# Patient Record
Sex: MALE | Race: WHITE | Employment: FULL TIME | URBAN - METROPOLITAN AREA
[De-identification: names, ages, dates, MRNs, and addresses within clinical notes are randomized per-mention and may not be internally consistent; named-entity substitution may affect disease eponyms.]

---

## 2022-12-19 ENCOUNTER — HOSPITAL ENCOUNTER (OUTPATIENT)
Dept: PHYSICAL THERAPY | Age: 72
Setting detail: THERAPIES SERIES
Discharge: HOME | End: 2022-12-19
Payer: MEDICARE

## 2022-12-19 PROCEDURE — 97110 THERAPEUTIC EXERCISES: CPT

## 2022-12-19 PROCEDURE — 97161 PT EVAL LOW COMPLEX 20 MIN: CPT

## 2022-12-19 NOTE — PLAN OF CARE
[address]  Phone: [phone]   Fax:     [phone]                                                       Physical Therapy Certification    Dear Doreen Hill*,    We had the pleasure of evaluating the following patient for physical therapy services at [address] [place] [address]. A summary of our findings can be found in the initial assessment below. This includes our plan of care. If you have any questions or concerns regarding these findings, please do not hesitate to contact me at the office phone number checked above. Thank you for the referral.       Physician Signature:_______________________________Date:__________________  By signing above (or electronic signature), therapists plan is approved by physician      Patient: Gabi Trotter   : 1950   MRN: [id number]  Referring Physician: Doreen Hill*      Evaluation Date: 2022     Medical Diagnosis Information:  Diagnosis: M62.81 muscle weakness, generalized   Treatment Diagnosis: M62.81                                         Insurance information: PT Insurance Information: Medicare; [place]; ded met; [place] visits     Precautions/ Contra-indications: hx of stroke- affected L side; L side vision limited  Latex Allergy:  [x]NO      []YES  Preferred Language for Healthcare:   [x]English       []other:    C-SSRS Triggered by Intake questionnaire (Past 2 wk assessment ):   [x] No, Questionnaire did not trigger screening.   [] Yes, Patient intake triggered C-SSRS Screening      [] C-SSRS Screening completed  [] PCP notified via Epic     SUBJECTIVE: Patient stated complaint: Pt states back in July he fell down the stairs for the first time after he was unable to clear his R foot up the step and fell backwards.  States he did get MRI after fall and nothing substantial was found. 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 Pt states the last few months he feels his strength has gotten a little worse and notices he has to use his hands to push off or pull himself up into tractors/cars or needs assistance from his wife. Pt states he does not have any pain in the legs, just occasionally through the back but states it is just dependent on what he has been doing that day or how much he has worked. Pt states he does not recall any other falls in the last 6 months since initial fall down the steps. Denies changes in medication/lifestyle around time of fall.      Relevant Medical History:hx of stroke 13 years ago- affect Left side but reports full recovery; HTN  Functional Scale/Score: FOTO: 49; Risk adjusted: 47; discharge score: 58    Pain Scale: 0/10  Easing factors: rest  Provocative factors: fatigue, lifting/increased load     Type: []Constant   [x]Intermittent  []Radiating [x]Localized []other:     Numbness/Tingling: pt denies; reports diabetic neuropathy in bilateral feet, no major difference    Occupation/School: farmer    Living Status/Prior Level of Function: Independent with ADLs and IADLs; farm chores    OBJECTIVE:     ROM LEFT RIGHT   Lumbar flex FT to ankles; *seated   Lumbar ext neutral *seated   Lumbar rotation 50% 75%        HIP Flex 109 94   HIP Abd     HIP Ext     HIP IR 25 20   HIP ER 33 28   Knee ext 2 4   Knee Flex 132 130   Ankle PF     Ankle DF 5 7   Ankle In     Ankle Ev     Strength  LEFT RIGHT   HIP Flexors     HIP Abductors     HIP Ext     Hip ER     Knee EXT (quad) 40.7 48.8   Knee Flex (HS) 19.2 33.7   Ankle DF 25.1 28.8   Ankle PF     Ankle Inv 13.8 18.4   Ankle EV 13.7 21.6          Reflexes/Sensation:    [x]Dermatomes/Myotomes intact    [x]Reflexes equal and normal bilaterally- patellar 3+ baljeet; R achilles 2+, L achilles 1+; neg babinski/Lazo   []Other:    Joint mobility: L > R hip/knee   []Normal    [x]Hypo   []Hyper    Palpation: no TTP    Functional Mobility/Transfers: UE assist for push up/ecc control in/out of sitting positions    Posture: forward flexed; no obvious abnormalities    Bandages/Dressings/Incisions: n/a    Gait: (include devices/WB status) non-antalgic; baljeet Trendelenburg R > L    Orthopedic Special Tests:   N/a                         [x] Patient history, allergies, meds reviewed. Medical chart reviewed. See intake form. Review Of Systems (ROS):  [x]Performed Review of systems (Integumentary, CardioPulmonary, Neurological) by intake and observation. Intake form has been scanned into medical record. Patient has been instructed to contact their primary care physician regarding ROS issues if not already being addressed at this time.       Co-morbidities/Complexities (which will affect course of rehabilitation):   []None           Arthritic conditions   []Rheumatoid arthritis (M05.9)  [x]Osteoarthritis (M19.91)   Cardiovascular conditions   [x]Hypertension (I10)  []Hyperlipidemia (E78.5)  []Angina pectoris (I20)  []Atherosclerosis (I70)  [x]CVA Musculoskeletal conditions   []Disc pathology   []Congenital spine pathologies   []Prior surgical intervention  []Osteoporosis (M81.8)  []Osteopenia (M85.8)   Endocrine conditions   []Hypothyroid (E03.9)  []Hyperthyroid Gastrointestinal conditions   []Constipation (U88.46)   Metabolic conditions   []Morbid obesity (E66.01)  [x]Diabetes type 1(E10.65) or 2 (E11.65)   [x]Neuropathy (G60.9)     Pulmonary conditions   []Asthma (J45)  []Coughing   []COPD (J44.9)   Psychological Disorders  []Anxiety (F41.9)  []Depression (F32.9)   []Other:   []Other:          Barriers to/and or personal factors that will affect rehab potential:              []Age  []Sex    []Smoker              []Motivation/Lack of Motivation                        [x]Co-Morbidities              []Cognitive Function, education/learning barriers              []Environmental, home barriers              []profession/work barriers  []past PT/medical experience  []other:  Justification:     Falls Risk Assessment (30 days):   [] Falls Risk assessed and no intervention required. [x] Falls Risk assessed and Patient requires intervention due to being higher risk   TUG score (>12s at risk):     [] Falls education provided, including         ASSESSMENT: Pt presents for evaluation of bilateral lower extremity weakness that has progressively worsened over the last 6 months. Upon assessment, pt cleared for lumbar referral/radiculopathy presentations. Pt does have general LE weakness bilaterally, L > R, worsened by history of CVA and co-morbidities. Benefit from further functional balance assessment at future visit to address deficits and decrease falls risk. Pt had excellent tolerance to treatment today, only limited by fatigue and SOB. Pt to benefit from skilled PT to improve functional strength, endurance, balance, and decrease risk of falls and injury.    Functional Impairments:     []Noted lumbar/proximal hip/LE hypomobility   [x]Decreased LE functional ROM   [x]Decreased core/proximal hip strength and neuromuscular control   [x]Decreased LE functional strength   [x]Reduced balance/proprioceptive control   []other:      Functional Activity Limitations (from functional questionnaire and intake)   []Reduced ability to tolerate prolonged functional positions   [x]Reduced ability or difficulty with changes of positions or transfers between positions   [x]Reduced ability to maintain good posture and demonstrate good body mechanics with sitting, bending, and lifting   []Reduced ability to sleep   [] Reduced ability or tolerance with driving and/or computer work   [x]Reduced ability to perform lifting, carrying tasks   [x]Reduced ability to squat   [x]Reduced ability to forward bend   [x]Reduced ability to ambulate prolonged functional periods/distances/surfaces   [x]Reduced ability to ascend/descend stairs   [x]Reduced ability to run, hop or jump   []other:     Participation Restrictions   []Reduced participation in self care activities   [x]Reduced participation in home management activities   [x]Reduced participation in work activities   []Reduced participation in social activities. []Reduced participation in sport activities. Classification :    []Signs/symptoms consistent with post-surgical status including decreased ROM, strength and function.    []Signs/symptoms consistent with joint sprain/strain   []Signs/symptoms consistent with patella-femoral syndrome   []Signs/symptoms consistent with knee OA/hip OA   []Signs/symptoms consistent with internal derangement of knee/Hip   [x]Signs/symptoms consistent with functional hip weakness/NMR control      []Signs/symptoms consistent with tendinitis/tendinosis    []signs/symptoms consistent with pathology which may benefit from Dry needling      []other:      Prognosis/Rehab Potential:      []Excellent   [x]Good    []Fair   []Poor    Tolerance of evaluation/treatment:    []Excellent   [x]Good    []Fair   []Poor    Physical Therapy Evaluation Complexity Justification  [x] A history of present problem with:  [] no personal factors and/or comorbidities that impact the plan of care;  []1-2 personal factors and/or comorbidities that impact the plan of care  [x]3 personal factors and/or comorbidities that impact the plan of care  [x] An examination of body systems using standardized tests and measures addressing any of the following: body structures and functions (impairments), activity limitations, and/or participation restrictions;:  [x] a total of 1-2 or more elements   [] a total of 3 or more elements   [] a total of 4 or more elements   [x] A clinical presentation with:  [x] stable and/or uncomplicated characteristics   [] evolving clinical presentation with changing characteristics  [] unstable and unpredictable characteristics;   [x] Clinical decision making of [x] low, [] moderate, [] high complexity using standardized patient assessment instrument and/or measurable assessment of functional outcome. [x] EVAL (LOW) 14612 (typically 20 minutes face-to-face)  [] EVAL (MOD) 20740 (typically 30 minutes face-to-face)  [] EVAL (HIGH) 47392 (typically 45 minutes face-to-face)  [] RE-EVAL     PLAN:  Frequency/Duration:  2 days per week for 8 Weeks:  Interventions:  [x]  Therapeutic exercise including: strength training, ROM, for Lower extremity and core   [x]  NMR activation and proprioception for LE, Glutes and Core   [x]  Manual therapy as indicated for LE, Hip and spine to include: Dry Needling/IASTM, STM, PROM, Gr I-IV mobilizations, manipulation. [x] Modalities as needed that may include: thermal agents, E-stim, Biofeedback, US, iontophoresis as indicated  [x] Patient education on joint protection, postural re-education, activity modification, progression of HEP. HEP instruction: DMHRPL    GOALS:  Patient stated goal: \"be able to get in and out of the tractor\"  [] Progressing: [] Met: [x] Not Met: [] Adjusted    Therapist goals for Patient:   Short Term Goals: To be achieved in: 2 weeks  1. Independent in HEP and progression per patient tolerance, in order to prevent re-injury. [] Progressing: [] Met: [x] Not Met: [] Adjusted  2. Patient will have a decrease in pain to facilitate improvement in movement, function, and ADLs as indicated by Functional Deficits. [] Progressing: [] Met: [x] Not Met: [] Adjusted    Long Term Goals: To be achieved in: 8 weeks  1. Disability index score of 10% or less for the LEFS to assist with reaching prior level of function. [] Progressing: [] Met: [x] Not Met: [] Adjusted  2. Patient will demonstrate increased AROM to full LE baljeet to allow for proper joint functioning as indicated by patients Functional Deficits.    [] Progressing: [] Met: [x] Not Met: [] Adjusted  3. Patient will demonstrate an increase in Strength to good proximal hip strength and control, within 5lb HHD in LE to allow for proper functional mobility as indicated by patients Functional Deficits. [] Progressing: [] Met: [x] Not Met: [] Adjusted  4. Patient will return to daily functional activities without increased symptoms or restriction. [] Progressing: [] Met: [x] Not Met: [] Adjusted  5. Patient will be able to stand from a standard height chair without increased symptoms or restrictions. [] Progressing: [] Met: [x] Not Met: [] Adjusted  5. BY DISCHARGE: Pt will be able to get in and out of the tractor at the end of the day without assistance or restrictions.   [] Progressing: [] Met: [x] Not Met: [] Adjusted     Electronically signed by:  Adam Lucia PT

## 2022-12-19 NOTE — FLOWSHEET NOTE
[facility]  [address] [facility]  Phone: [phone] Fax: [phone]    Physical Therapy Treatment Note/ Progress Report:       Date:  2022    Patient Name:  Callie Morris    :  1950  MRN: [id number]  Restrictions/Precautions:    Medical/Treatment Diagnosis Information:  Diagnosis: M62.81 muscle weakness, generalized  Treatment Diagnosis: U61.49  Insurance/Certification information:  PT Insurance Information: Medicare; [place]; ded met; [place] visits  Physician Information:  [facility] signed (Y/N):     Date of Patient follow up with Physician:      Progress Report: [x]  Yes  []  No     Date Range for reporting period:  Beginnin2022  Ending:     Progress report due (10 Rx/or 30 days whichever is less): 803     Recertification due (POC duration/ or 90 days whichever is less): 2023     Visit # Insurance Allowable Auth Needed   1 [place] []Yes    [x]No     Latex Allergy:  [x]NO      []YES  Preferred Language for Healthcare:   [x]English       []other:    Functional Scale: FOTO: 49; Risk adjusted: 47; discharge score: 58   Date assessed:2022    Pain level:  0-2/10     SUBJECTIVE:  See eval    OBJECTIVE: See eval  Observation:   Test measurements:      RESTRICTIONS/PRECAUTIONS: hx of stroke- affected L side; L side vision limited; increased falls risk    Exercises/Interventions:     Therapeutic Ex (42550)   Min: 20 Sets/sec Reps Notes/CUES   Retro Stepper/BIKE      Alter G      Bridge 2 10    Sportco      SLR 2 15 baljeet   Supine hip abd iso 1 15 Black AK   Clam ABD      Hip Ext Vena Raspberry      BOSU fwd/side lunge      BOSU squat      Leg Press Iso/Con/Ecc 0-      Cybex HS curl      TKE      Glute side walks      RDL      Slide Lunge      Slide HS eccentrics      Step ups/ecc step down      Swissball wall rolls- in SLS- hip drive      Quad hip ext/wall-ball rolls Manual Intervention (06023)  Min:      Knee mobs/PROM      Tib/Fem Mobs      Patella Mobs      Ankle mobs                  NMR re-education (34723)  Min:   CUES NEEDED   Guyanese/Biofeedback 10/10      BFR      G. Med activation      Hip Ext full ROM/ G. Activation      Bosu Bal and Prop- G Med      Single leg stance/Balance/Prop      Bosu Retro G. Med act      Prone Hip froggers- sliders/elevated            Therapeutic Activity (02509)  Min:      Ladders      Plyos      Dynamic Balance                            Therapeutic Exercise and NMR EXR  [x] (74977) Provided verbal/tactile cueing for activities related to strengthening, flexibility, endurance, ROM for improvements in LE, proximal hip, and core control with self care, mobility, lifting, ambulation. [x] (13557) Provided verbal/tactile cueing for activities related to improving balance, coordination, kinesthetic sense, posture, motor skill, proprioception  to assist with LE, proximal hip, and core control in self care, mobility, lifting, ambulation and eccentric single leg control.      NMR and Therapeutic Activities:    [x] (72707 or 91324) Provided verbal/tactile cueing for activities related to improving balance, coordination, kinesthetic sense, posture, motor skill, proprioception and motor activation to allow for proper function of core, proximal hip and LE with self care and ADLs and functional mobility.   [] (34547) Gait Re-education- Provided training and instruction to the patient for proper LE, core and proximal hip recruitment and positioning and eccentric body weight control with ambulation re-education including up and down stairs     Home Exercise Program:    [x] (70410) Reviewed/Progressed HEP activities related to strengthening, flexibility, endurance, ROM of core, proximal hip and LE for functional self-care, mobility, lifting and ambulation/stair navigation   [] (19136)Reviewed/Progressed HEP activities related to improving balance, coordination, kinesthetic sense, posture, motor skill, proprioception of core, proximal hip and LE for self care, mobility, lifting, and ambulation/stair navigation      Manual Treatments:  PROM / STM / Oscillations-Mobs:  G-I, II, III, IV (PA's, Inf., Post.)  [x] (91556) Provided manual therapy to mobilize LE, proximal hip and/or LS spine soft tissue/joints for the purpose of modulating pain, promoting relaxation,  increasing ROM, reducing/eliminating soft tissue swelling/inflammation/restriction, improving soft tissue extensibility and allowing for proper ROM for normal function with self care, mobility, lifting and ambulation. Modalities:     [] GAME READY (VASO)- for significant edema, swelling, pain control. Charges:  Timed Code Treatment Minutes: 25   Total Treatment Minutes: 50      [x] EVAL (LOW) 83863 (typically 20 minutes face-to-face)  [] EVAL (MOD) 35050 (typically 30 minutes face-to-face)  [] EVAL (HIGH) 73968 (typically 45 minutes face-to-face)  [] RE-EVAL     [x] ZV(96315) x 1    [] DRY NEEDLE 1 OR 2 MUSCLES  [] NMR (47922) x     [] DRY NEEDLE 3+ MUSCLES  [] Manual (54170) x       [] TA (42304) x     [] Mech Traction (50088)  [] ES(attended) (35148)     [] ES (un) (35889):   [] VASO (38347)  [] Other:      GOALS  Patient stated goal: \"be able to get in and out of the tractor\"  [] Progressing: [] Met: [x] Not Met: [] Adjusted    Therapist goals for Patient:   Short Term Goals: To be achieved in: 2 weeks  1. Independent in HEP and progression per patient tolerance, in order to prevent re-injury. [] Progressing: [] Met: [x] Not Met: [] Adjusted  2. Patient will have a decrease in pain to facilitate improvement in movement, function, and ADLs as indicated by Functional Deficits. [] Progressing: [] Met: [x] Not Met: [] Adjusted    Long Term Goals: To be achieved in: 8 weeks  1. Disability index score of 10% or less for the LEFS to assist with reaching prior level of function.    [] Progressing: [] Met: [x] Not Met: [] Adjusted  2. Patient will demonstrate increased AROM to full LE baljeet to allow for proper joint functioning as indicated by patients Functional Deficits. [] Progressing: [] Met: [x] Not Met: [] Adjusted  3. Patient will demonstrate an increase in Strength to good proximal hip strength and control, within 5lb HHD in LE to allow for proper functional mobility as indicated by patients Functional Deficits. [] Progressing: [] Met: [x] Not Met: [] Adjusted  4. Patient will return to daily functional activities without increased symptoms or restriction. [] Progressing: [] Met: [x] Not Met: [] Adjusted  5. Patient will be able to stand from a standard height chair without increased symptoms or restrictions. [] Progressing: [] Met: [x] Not Met: [] Adjusted  5. BY DISCHARGE: Pt will be able to get in and out of the tractor at the end of the day without assistance or restrictions. [] Progressing: [] Met: [x] Not Met: [] Adjusted     ASSESSMENT:  See eval          Treatment/Activity Tolerance:  [x] Patient tolerated treatment well [] Patient limited by fatique  [] Patient limited by pain  [] Patient limited by other medical complications  [] Other:     Overall Progression Towards Functional goals/ Treatment Progress Update:  [] Patient is progressing as expected towards functional goals listed. [] Progression is slowed due to complexities/Impairments listed. [] Progression has been slowed due to co-morbidities.   [x] Plan just implemented, too soon to assess goals progression <30days   [] Goals require adjustment due to lack of progress  [] Patient is not progressing as expected and requires additional follow up with physician  [] Other    Prognosis for POC: [x] Good [] Fair  [] Poor    Patient requires continued skilled intervention: [x] Yes  [] No        PLAN: See eval  [] Continue per plan of care [] Alter current plan (see comments)  [x] Plan of care initiated [] Hold pending MD visit [] Discharge    Electronically signed by: Dedrick Thakkar PT    Note: If patient does not return for scheduled/recommended follow up visits, this note will serve as a discharge from care along with the most recent update on progress.

## 2022-12-22 ENCOUNTER — HOSPITAL ENCOUNTER (OUTPATIENT)
Dept: PHYSICAL THERAPY | Age: 72
Setting detail: THERAPIES SERIES
Discharge: HOME | End: 2022-12-22
Payer: MEDICARE

## 2022-12-22 PROCEDURE — 97110 THERAPEUTIC EXERCISES: CPT

## 2022-12-22 PROCEDURE — 97530 THERAPEUTIC ACTIVITIES: CPT

## 2022-12-22 NOTE — FLOWSHEET NOTE
[place] [address] [facility], [address]  Phone: [phone] Fax: [phone]    Physical Therapy Treatment Note/ Progress Report:       Date:  2022    Patient Name:  Joshua Andersen    :  1950  MRN: [id number]  Restrictions/Precautions:    Medical/Treatment Diagnosis Information:  Diagnosis: M62.81 muscle weakness, generalized  Treatment Diagnosis: R54.88  Insurance/Certification information:  PT Insurance Information: Medicare; [place]; ded met; [place] visits  Physician Information:  Tai [facility] signed (Y/N):     Date of Patient follow up with Physician:      Progress Report: []  Yes  [x]  No     Date Range for reporting period:  Beginnin2022  Ending:     Progress report due (10 Rx/or 30 days whichever is less): 3630     Recertification due (POC duration/ or 90 days whichever is less): 2023     Visit # Insurance Allowable Auth Needed   2 [place] []Yes    [x]No     Latex Allergy:  [x]NO      []YES  Preferred Language for Healthcare:   [x]English       []other:    Functional Scale: FOTO: 49; Risk adjusted: 47; discharge score: 58   Date assessed:2022    Pain level:  0-2/10     SUBJECTIVE:  States he has had no issues w/ HEP exercises, is safe to do them when wife is home in case he needs help.      OBJECTIVE:   Observation:   Test measurements:      RESTRICTIONS/PRECAUTIONS: hx of stroke- affected L side; L side vision limited; increased falls risk    Exercises/Interventions:     Therapeutic Ex (53195)   Min: 25 Sets/sec Reps Notes/CUES   Retro Stepper/BIKE 5'  Resistance 5 - increase as able   Alter G      Bridge   HEP   Sportcord March      SLR   HEP   Supine hip abd iso   HEP; Black AK   Clam ABD      Hip Ext Marcina Ny      BOSU fwd/side lunge      BOSU squat      Leg Press DL 3 10 90#   Cybex HS curl      TKE      Glute side walks 2 20 Green AK   RDL      Slide Lunge      Slide HS eccentrics      Step ups/ecc step down      Swissball wall rolls- in SLS- hip drive      Quad hip ext/wall-ball rolls                  Manual Intervention (20565)  Min:      Knee mobs/PROM      Tib/Fem Mobs      Patella Mobs      Ankle mobs                  NMR re-education (68530)  Min: 5   CUES NEEDED   Rhomberg balance - airex 20'' 3 Eyes open at half wall; mod difficulty   BFR      G. Med activation      Hip Ext full ROM/ G. Activation      Bosu Bal and Prop- G Med      Single leg stance/Balance/Prop      Bosu Retro G. Med act      Prone Hip froggers- sliders/elevated            Therapeutic Activity (06212)  Min: 15      Ladders      Plyos      Dynamic Balance      Sit to stand 2 10 High low table- approx 50 deg knee flexion   Step up 2 5 Clemente; half wall             Therapeutic Exercise and NMR EXR  [x] (06733) Provided verbal/tactile cueing for activities related to strengthening, flexibility, endurance, ROM for improvements in LE, proximal hip, and core control with self care, mobility, lifting, ambulation. [x] (84718) Provided verbal/tactile cueing for activities related to improving balance, coordination, kinesthetic sense, posture, motor skill, proprioception  to assist with LE, proximal hip, and core control in self care, mobility, lifting, ambulation and eccentric single leg control.      NMR and Therapeutic Activities:    [x] (72686 or 70261) Provided verbal/tactile cueing for activities related to improving balance, coordination, kinesthetic sense, posture, motor skill, proprioception and motor activation to allow for proper function of core, proximal hip and LE with self care and ADLs and functional mobility.   [] (35603) Gait Re-education- Provided training and instruction to the patient for proper LE, core and proximal hip recruitment and positioning and eccentric body weight control with ambulation re-education including up and down stairs     Home Exercise Program:    [x] (07657) Reviewed/Progressed HEP activities related to strengthening, flexibility, endurance, ROM of core, proximal hip and LE for functional self-care, mobility, lifting and ambulation/stair navigation   [] (55518)Reviewed/Progressed HEP activities related to improving balance, coordination, kinesthetic sense, posture, motor skill, proprioception of core, proximal hip and LE for self care, mobility, lifting, and ambulation/stair navigation      Manual Treatments:  PROM / STM / Oscillations-Mobs:  G-I, II, III, IV (PA's, Inf., Post.)  [x] (45953) Provided manual therapy to mobilize LE, proximal hip and/or LS spine soft tissue/joints for the purpose of modulating pain, promoting relaxation,  increasing ROM, reducing/eliminating soft tissue swelling/inflammation/restriction, improving soft tissue extensibility and allowing for proper ROM for normal function with self care, mobility, lifting and ambulation. Modalities:     [] GAME READY (VASO)- for significant edema, swelling, pain control. Charges:  Timed Code Treatment Minutes: 45   Total Treatment Minutes: 45      [] EVAL (LOW) 30563 (typically 20 minutes face-to-face)  [] EVAL (MOD) 83121 (typically 30 minutes face-to-face)  [] EVAL (HIGH) 46137 (typically 45 minutes face-to-face)  [] RE-EVAL     [x] RT(99457) x 2    [] DRY NEEDLE 1 OR 2 MUSCLES  [] NMR (81255) x     [] DRY NEEDLE 3+ MUSCLES  [] Manual (57533) x       [x] TA (56700) x  1   [] Mech Traction (32883)  [] ES(attended) (93269)     [] ES (un) (87105):   [] VASO (58550)  [] Other:      GOALS  Patient stated goal: \"be able to get in and out of the tractor\"  [] Progressing: [] Met: [x] Not Met: [] Adjusted    Therapist goals for Patient:   Short Term Goals: To be achieved in: 2 weeks  1. Independent in HEP and progression per patient tolerance, in order to prevent re-injury. [] Progressing: [] Met: [x] Not Met: [] Adjusted  2.  Patient will have a decrease in pain to facilitate improvement in movement, function, and ADLs as indicated by Functional Deficits. [] Progressing: [] Met: [x] Not Met: [] Adjusted    Long Term Goals: To be achieved in: 8 weeks  1. Disability index score of 10% or less for the LEFS to assist with reaching prior level of function. [] Progressing: [] Met: [x] Not Met: [] Adjusted  2. Patient will demonstrate increased AROM to full LE baljeet to allow for proper joint functioning as indicated by patients Functional Deficits. [] Progressing: [] Met: [x] Not Met: [] Adjusted  3. Patient will demonstrate an increase in Strength to good proximal hip strength and control, within 5lb HHD in LE to allow for proper functional mobility as indicated by patients Functional Deficits. [] Progressing: [] Met: [x] Not Met: [] Adjusted  4. Patient will return to daily functional activities without increased symptoms or restriction. [] Progressing: [] Met: [x] Not Met: [] Adjusted  5. Patient will be able to stand from a standard height chair without increased symptoms or restrictions. [] Progressing: [] Met: [x] Not Met: [] Adjusted  5. BY DISCHARGE: Pt will be able to get in and out of the tractor at the end of the day without assistance or restrictions. [] Progressing: [] Met: [x] Not Met: [] Adjusted     ASSESSMENT: Excellent tolerance to treatment today, focusing on closed chain functional loading and balance. Pt does have difficult time w/ anterior weight shift 2/2 neuropathy. Requires cues for eccentric control and preventing hyperextension through knees. Has slight increase in difficulty w/ LLE single leg loading 2/2 history of stroke. Increased time for task completion and rest breaks. Continue to progress functional strength and balance to improve independence and decrease falls risk.           Treatment/Activity Tolerance:  [x] Patient tolerated treatment well [] Patient limited by fatique  [] Patient limited by pain  [] Patient limited by other medical complications  [] Other:     Overall Progression Towards Functional goals/ Treatment Progress Update:  [] Patient is progressing as expected towards functional goals listed. [] Progression is slowed due to complexities/Impairments listed. [] Progression has been slowed due to co-morbidities. [x] Plan just implemented, too soon to assess goals progression <30days   [] Goals require adjustment due to lack of progress  [] Patient is not progressing as expected and requires additional follow up with physician  [] Other    Prognosis for POC: [x] Good [] Fair  [] Poor    Patient requires continued skilled intervention: [x] Yes  [] No        PLAN: 2x/week x8 weeks  [] Continue per plan of care [] Alter current plan (see comments)  [x] Plan of care initiated [] Hold pending MD visit [] Discharge    Electronically signed by: Handy Joseph PT    Note: If patient does not return for scheduled/recommended follow up visits, this note will serve as a discharge from care along with the most recent update on progress.

## 2022-12-27 ENCOUNTER — HOSPITAL ENCOUNTER (OUTPATIENT)
Dept: PHYSICAL THERAPY | Age: 72
Setting detail: THERAPIES SERIES
Discharge: HOME | End: 2022-12-27
Payer: MEDICARE

## 2022-12-27 PROCEDURE — 97530 THERAPEUTIC ACTIVITIES: CPT

## 2022-12-27 PROCEDURE — 97110 THERAPEUTIC EXERCISES: CPT

## 2022-12-27 NOTE — FLOWSHEET NOTE
[facility] [address] [facility]  Phone: [phone] Fax: [phone]    Physical Therapy Treatment Note/ Progress Report:       Date:  2022    Patient Name:  Lang Joseph    :  1950  MRN: [id number]  Restrictions/Precautions:    Medical/Treatment Diagnosis Information:  Diagnosis: M62.81 muscle weakness, generalized  Treatment Diagnosis: R29.68  Insurance/Certification information:  PT Insurance Information: Medicare; [place]; ded met; [place] visits  Physician Information:  Tai [facility] signed (Y/N):     Date of Patient follow up with Physician:      Progress Report: []  Yes  [x]  No     Date Range for reporting period:  Beginnin2022  Ending:     Progress report due (10 Rx/or 30 days whichever is less):      Recertification due (POC duration/ or 90 days whichever is less): 2023     Visit # Insurance Allowable Auth Needed   3 [place] []Yes    [x]No     Latex Allergy:  [x]NO      []YES  Preferred Language for Healthcare:   [x]English       []other:    Functional Scale: FOTO: 49; Risk adjusted: 47; discharge score: 58   Date assessed:2022    Pain level:  0-2/10     SUBJECTIVE:  States he is doing well with home exercises. He had a couple of days where he did not get on the floor to do HEP since his wife was not home to help in case he could not get back up by himself. No complaints of pain today. Pt reports that he has a lot of difficulty lifting the L foot to put it up as high as he would need to get a foothold on his tractor step.      OBJECTIVE:   Observation:   Test measurements:      RESTRICTIONS/PRECAUTIONS: hx of stroke- affected L side; L side vision limited; increased falls risk    Exercises/Interventions:     Therapeutic Ex (92684)   Min: 30 Sets/sec Reps Notes/CUES   Retro Stepper/BIKE 5'  Resistance 5 - increase as able   Alter G      Bridge   HEP   LAQ in and ADLs and functional mobility.   [] (34924) Gait Re-education- Provided training and instruction to the patient for proper LE, core and proximal hip recruitment and positioning and eccentric body weight control with ambulation re-education including up and down stairs     Home Exercise Program:    [x] (71285) Reviewed/Progressed HEP activities related to strengthening, flexibility, endurance, ROM of core, proximal hip and LE for functional self-care, mobility, lifting and ambulation/stair navigation   [] (69853)Reviewed/Progressed HEP activities related to improving balance, coordination, kinesthetic sense, posture, motor skill, proprioception of core, proximal hip and LE for self care, mobility, lifting, and ambulation/stair navigation      Manual Treatments:  PROM / STM / Oscillations-Mobs:  G-I, II, III, IV (PA's, Inf., Post.)  [x] (40547) Provided manual therapy to mobilize LE, proximal hip and/or LS spine soft tissue/joints for the purpose of modulating pain, promoting relaxation,  increasing ROM, reducing/eliminating soft tissue swelling/inflammation/restriction, improving soft tissue extensibility and allowing for proper ROM for normal function with self care, mobility, lifting and ambulation. Modalities:     [] GAME READY (VASO)- for significant edema, swelling, pain control.      Charges:  Timed Code Treatment Minutes: 40   Total Treatment Minutes: 45      [] EVAL (LOW) 22389 (typically 20 minutes face-to-face)  [] EVAL (MOD) 78397 (typically 30 minutes face-to-face)  [] EVAL (HIGH) 99336 (typically 45 minutes face-to-face)  [] RE-EVAL     [x] NT(75255) x 2    [] DRY NEEDLE 1 OR 2 MUSCLES  [] NMR (07678) x     [] DRY NEEDLE 3+ MUSCLES  [] Manual (89952) x       [x] TA (13316) x  1   [] Mech Traction (84305)  [] ES(attended) (37514)     [] ES (un) (04246):   [] VASO (35418)  [] Other:      GOALS  Patient stated goal: \"be able to get in and out of the tractor\"  [] Progressing: [] Met: [x] Not Met: [] Continue to progress functional strength and balance to improve independence and decrease falls risk. Treatment/Activity Tolerance:  [x] Patient tolerated treatment well [] Patient limited by fatique  [] Patient limited by pain  [] Patient limited by other medical complications  [] Other:     Overall Progression Towards Functional goals/ Treatment Progress Update:  [] Patient is progressing as expected towards functional goals listed. [] Progression is slowed due to complexities/Impairments listed. [] Progression has been slowed due to co-morbidities. [x] Plan just implemented, too soon to assess goals progression <30days   [] Goals require adjustment due to lack of progress  [] Patient is not progressing as expected and requires additional follow up with physician  [] Other    Prognosis for POC: [x] Good [] Fair  [] Poor    Patient requires continued skilled intervention: [x] Yes  [] No        PLAN: 2x/week x8 weeks  [x] Continue per plan of care [] Alter current plan (see comments)  [] Plan of care initiated [] Hold pending MD visit [] Discharge    Electronically signed by: Levy Quintanilla PTA    Note: If patient does not return for scheduled/recommended follow up visits, this note will serve as a discharge from care along with the most recent update on progress.

## 2022-12-29 ENCOUNTER — HOSPITAL ENCOUNTER (OUTPATIENT)
Dept: PHYSICAL THERAPY | Age: 72
Setting detail: THERAPIES SERIES
Discharge: HOME | End: 2022-12-29
Payer: MEDICARE

## 2022-12-29 PROCEDURE — 97530 THERAPEUTIC ACTIVITIES: CPT

## 2022-12-29 PROCEDURE — 97110 THERAPEUTIC EXERCISES: CPT

## 2022-12-29 NOTE — FLOWSHEET NOTE
[place] [address] [facility]  Phone: [phone] Fax: [phone]    Physical Therapy Treatment Note/ Progress Report:       Date:  2022    Patient Name:  Akash Menjivar    :  1950  MRN: [id number]  Restrictions/Precautions:    Medical/Treatment Diagnosis Information:  Diagnosis: M62.81 muscle weakness, generalized  Treatment Diagnosis: J62.40  Insurance/Certification information:  PT Insurance Information: Medicare; [place]; ded met; [place] visits  Physician Information:  Tai [facility] signed (Y/N):     Date of Patient follow up with Physician:      Progress Report: []  Yes  [x]  No     Date Range for reporting period:  Beginnin2022  Ending:     Progress report due (10 Rx/or 30 days whichever is less):      Recertification due (POC duration/ or 90 days whichever is less): 2023     Visit # Insurance Allowable Auth Needed   4 [place] []Yes    [x]No     Latex Allergy:  [x]NO      []YES  Preferred Language for Healthcare:   [x]English       []other:    Functional Scale: FOTO: 49; Risk adjusted: 47; discharge score: 58   Date assessed:2022    Pain level:  0-2/10     SUBJECTIVE:  Pt states that he is doing ok; has to take breaks at times as he gets short of breath. Working on exercises at home without much issue.      OBJECTIVE:   Observation:   Test measurements:      RESTRICTIONS/PRECAUTIONS: hx of stroke- affected L side; L side vision limited; increased falls risk    Exercises/Interventions:     Therapeutic Ex (50198)   Min: 30 Sets/sec Reps Notes/CUES   Retro Stepper/BIKE 5'  Resistance 5 - increase as able   Alter G      Bridge   HEP   LAQ in chair 2 10 3lb   Hip rotations/abductions in chair 2 10 3lb   SLR   HEP   Supine hip abd iso   HEP; Black AK   Standing hip abd 3lb    Hip Ext /table    Standing SC rows 2 10 Staggered stance, 2 yellow SC's         Leg Press DL 90#   Cybex HS curl      SC fwd/bwd walks 4 10 SBA, 1 yellow SC around waist   Glute side walks 4 10 Green AK at wall   RDL      Slide Lunge      Slide HS eccentrics      Step ups/ecc step down      Swissball wall rolls- in SLS- hip drive      Quad hip ext/wall-ball rolls                  Manual Intervention (93924)  Min:      Knee mobs/PROM      Tib/Fem Mobs      Patella Mobs      Ankle mobs                  NMR re-education (30306)  Min: 0   CUES NEEDED   Rhomberg balance - airex Eyes open at half wall; mod difficulty   BFR      G. Med activation      Hip Ext full ROM/ G. Activation      Bosu Bal and Prop- G Med      Single leg stance/Balance/Prop      Bosu Retro G. Med act      Prone Hip froggers- sliders/elevated            Therapeutic Activity (14836)  Min: 12      Ladders      Plyos      Standing marches  2 10 3lb on ankles   Sit to stand 2 10 Chair+2 Airex- approx 50 deg knee flexion   Step up Clemente; half wall, 4in box             Therapeutic Exercise and NMR EXR  [x] (75474) Provided verbal/tactile cueing for activities related to strengthening, flexibility, endurance, ROM for improvements in LE, proximal hip, and core control with self care, mobility, lifting, ambulation. [x] (67944) Provided verbal/tactile cueing for activities related to improving balance, coordination, kinesthetic sense, posture, motor skill, proprioception  to assist with LE, proximal hip, and core control in self care, mobility, lifting, ambulation and eccentric single leg control.      NMR and Therapeutic Activities:    [x] (89356 or 68312) Provided verbal/tactile cueing for activities related to improving balance, coordination, kinesthetic sense, posture, motor skill, proprioception and motor activation to allow for proper function of core, proximal hip and LE with self care and ADLs and functional mobility.   [] (68242) Gait Re-education- Provided training and instruction to the patient for proper LE, core and proximal hip recruitment and positioning and eccentric body weight control with ambulation re-education including up and down stairs     Home Exercise Program:    [x] (84201) Reviewed/Progressed HEP activities related to strengthening, flexibility, endurance, ROM of core, proximal hip and LE for functional self-care, mobility, lifting and ambulation/stair navigation   [] (06536)Reviewed/Progressed HEP activities related to improving balance, coordination, kinesthetic sense, posture, motor skill, proprioception of core, proximal hip and LE for self care, mobility, lifting, and ambulation/stair navigation      Manual Treatments:  PROM / STM / Oscillations-Mobs:  G-I, II, III, IV (PA's, Inf., Post.)  [] (60457) Provided manual therapy to mobilize LE, proximal hip and/or LS spine soft tissue/joints for the purpose of modulating pain, promoting relaxation,  increasing ROM, reducing/eliminating soft tissue swelling/inflammation/restriction, improving soft tissue extensibility and allowing for proper ROM for normal function with self care, mobility, lifting and ambulation. Modalities:     [] GAME READY (VASO)- for significant edema, swelling, pain control. Charges:  Timed Code Treatment Minutes: 42   Total Treatment Minutes: 45      [] EVAL (LOW) 16652 (typically 20 minutes face-to-face)  [] EVAL (MOD) 35277 (typically 30 minutes face-to-face)  [] EVAL (HIGH) 68415 (typically 45 minutes face-to-face)  [] RE-EVAL     [x] ZW(40962) x 2    [] DRY NEEDLE 1 OR 2 MUSCLES  [] NMR (54556) x     [] DRY NEEDLE 3+ MUSCLES  [] Manual (91318) x       [x] TA (87044) x  1   [] Mech Traction (24236)  [] ES(attended) (44502)     [] ES (un) (61811):   [] VASO (26683)  [] Other:      GOALS  Patient stated goal: \"be able to get in and out of the tractor\"  [] Progressing: [] Met: [x] Not Met: [] Adjusted    Therapist goals for Patient:   Short Term Goals: To be achieved in: 2 weeks  1.  Independent in HEP and progression per patient tolerance, in order to prevent re-injury. [] Progressing: [] Met: [x] Not Met: [] Adjusted  2. Patient will have a decrease in pain to facilitate improvement in movement, function, and ADLs as indicated by Functional Deficits. [] Progressing: [] Met: [x] Not Met: [] Adjusted    Long Term Goals: To be achieved in: 8 weeks  1. Disability index score of 10% or less for the LEFS to assist with reaching prior level of function. [] Progressing: [] Met: [x] Not Met: [] Adjusted  2. Patient will demonstrate increased AROM to full LE baljeet to allow for proper joint functioning as indicated by patients Functional Deficits. [] Progressing: [] Met: [x] Not Met: [] Adjusted  3. Patient will demonstrate an increase in Strength to good proximal hip strength and control, within 5lb HHD in LE to allow for proper functional mobility as indicated by patients Functional Deficits. [] Progressing: [] Met: [x] Not Met: [] Adjusted  4. Patient will return to daily functional activities without increased symptoms or restriction. [] Progressing: [] Met: [x] Not Met: [] Adjusted  5. Patient will be able to stand from a standard height chair without increased symptoms or restrictions. [] Progressing: [] Met: [x] Not Met: [] Adjusted  5. BY DISCHARGE: Pt will be able to get in and out of the tractor at the end of the day without assistance or restrictions. [] Progressing: [] Met: [x] Not Met: [] Adjusted     ASSESSMENT: Very good tolerance to treatment today; short rest breaks needed secondary to SOB. Pt  recovered well with short sitting break. Pt does have difficult time w/ anterior weight shift 2/2 neuropathy. Requires cues for eccentric control and preventing hyperextension through knees. Has slight increase in difficulty w/ LLE single leg loading 2/2 history of stroke. Increased time for task completion and rest breaks. Continue to progress functional strength and balance to improve independence and decrease falls risk.             Treatment/Activity Tolerance:  [x] Patient tolerated treatment well [] Patient limited by fatique  [] Patient limited by pain  [] Patient limited by other medical complications  [] Other:     Overall Progression Towards Functional goals/ Treatment Progress Update:  [] Patient is progressing as expected towards functional goals listed. [] Progression is slowed due to complexities/Impairments listed. [] Progression has been slowed due to co-morbidities. [x] Plan just implemented, too soon to assess goals progression <30days   [] Goals require adjustment due to lack of progress  [] Patient is not progressing as expected and requires additional follow up with physician  [] Other    Prognosis for POC: [x] Good [] Fair  [] Poor    Patient requires continued skilled intervention: [x] Yes  [] No        PLAN: 2x/week x8 weeks  [x] Continue per plan of care [] Alter current plan (see comments)  [] Plan of care initiated [] Hold pending MD visit [] Discharge    Electronically signed by: Jeane Adhikari PTA    Note: If patient does not return for scheduled/recommended follow up visits, this note will serve as a discharge from care along with the most recent update on progress.

## 2023-01-03 ENCOUNTER — HOSPITAL ENCOUNTER (OUTPATIENT)
Dept: PHYSICAL THERAPY | Age: 73
Setting detail: THERAPIES SERIES
Discharge: HOME | End: 2023-01-03
Payer: MEDICARE

## 2023-01-03 PROCEDURE — 97110 THERAPEUTIC EXERCISES: CPT

## 2023-01-03 PROCEDURE — 97530 THERAPEUTIC ACTIVITIES: CPT

## 2023-01-03 NOTE — FLOWSHEET NOTE
[facility] [address] [facility]  Phone: [phone] Fax: [phone]    Physical Therapy Treatment Note/ Progress Report:       Date:  2023    Patient Name:  Shakira Charlton    :  1950  MRN: [id number]  Restrictions/Precautions:    Medical/Treatment Diagnosis Information:  Diagnosis: M62.81 muscle weakness, generalized  Treatment Diagnosis: H25.63  Insurance/Certification information:  PT Insurance Information: Medicare; [place] [facility] [place]; ded met; [place] visits  Physician Information:  [facility] signed (Y/N):     Date of Patient follow up with Physician:      Progress Report: []  Yes  [x]  No     Date Range for reporting period:  Beginnin2022  Ending:     Progress report due (10 Rx/or 30 days whichever is less):      Recertification due (POC duration/ or 90 days whichever is less): 2023     Visit # Insurance Allowable Auth Needed   5 [place] []Yes    [x]No     Latex Allergy:  [x]NO      []YES  Preferred Language for Healthcare:   [x]English       []other:    Functional Scale: FOTO: 49; Risk adjusted: 47; discharge score: 58   Date assessed:2022    Pain level:  1-2/10 low back     SUBJECTIVE:  Pt states that he hurt his back a little yesterday, but states that he is ok to participate in PT today. Pt reports that he checks his oxygen saturation in the morning with a pulse ox on his finger; usually is in the upper 90's. \"I do need to take my mask down sometimes because I can't catch my breath. \"  Pt notes that he would like to lose some weight. He lost 80lb a couple of years ago over a 10 month period but has since gained some weight back.      OBJECTIVE:   Observation:   Test measurements:      RESTRICTIONS/PRECAUTIONS: hx of stroke- affected L side; L side vision limited; increased falls risk    Exercises/Interventions:     Therapeutic Ex (41727)   Min: 25 Sets/sec Reps Notes/CUES   Retro Stepper/BIKE 5'  Resistance 5 - increase as able   Alter G      Bridge   HEP   LAQ in chair 2 10 3lb   Hip rotations/abductions in chair 2 10 3lb   SLR   HEP   Supine hip abd iso   HEP; Black AK   Standing hip abd 3lb    Hip Ext /table 2 10     Standing SC rows 2 10 Staggered stance, light blue tube         Leg Press DL 90#   Cybex HS curl      SC fwd/bwd walks SBA, 1 yellow SC around waist   Glute side walks 4 10 3#   Standing hamstring curl 2 10 3#   Slide Lunge      Slide HS eccentrics      Step ups/ecc step down      Swissball wall rolls- in SLS- hip drive      Quad hip ext/wall-ball rolls                  Manual Intervention (63926)  Min:      Knee mobs/PROM      Tib/Fem Mobs      Patella Mobs      Ankle mobs                  NMR re-education (03237)  Min: 0   CUES NEEDED   Rhomberg balance - airex Eyes open at half wall; mod difficulty   BFR      G. Med activation      Hip Ext full ROM/ G. Activation      Bosu Bal and Prop- G Med      Single leg stance/Balance/Prop      Bosu Retro G. Med act      Prone Hip froggers- sliders/elevated            Therapeutic Activity (89811)  Min: 16      Ladders      Plyos      Standing marches  2 10 3lb on ankles   Sit to stand 2 10 Chair+2 Airex- approx 50 deg knee flexion   Step up 2 5 Clemente; half wall, 4in box   Mini squats at wall 1 10 BUE support from half wall       Therapeutic Exercise and NMR EXR  [x] (80816) Provided verbal/tactile cueing for activities related to strengthening, flexibility, endurance, ROM for improvements in LE, proximal hip, and core control with self care, mobility, lifting, ambulation. [x] (17929) Provided verbal/tactile cueing for activities related to improving balance, coordination, kinesthetic sense, posture, motor skill, proprioception  to assist with LE, proximal hip, and core control in self care, mobility, lifting, ambulation and eccentric single leg control.      NMR and Therapeutic Activities:    [x] (10189 or 21230) Provided verbal/tactile cueing for activities related to improving balance, coordination, kinesthetic sense, posture, motor skill, proprioception and motor activation to allow for proper function of core, proximal hip and LE with self care and ADLs and functional mobility.   [] (22025) Gait Re-education- Provided training and instruction to the patient for proper LE, core and proximal hip recruitment and positioning and eccentric body weight control with ambulation re-education including up and down stairs     Home Exercise Program:    [x] (56917) Reviewed/Progressed HEP activities related to strengthening, flexibility, endurance, ROM of core, proximal hip and LE for functional self-care, mobility, lifting and ambulation/stair navigation   [] (37435)Reviewed/Progressed HEP activities related to improving balance, coordination, kinesthetic sense, posture, motor skill, proprioception of core, proximal hip and LE for self care, mobility, lifting, and ambulation/stair navigation      Manual Treatments:  PROM / STM / Oscillations-Mobs:  G-I, II, III, IV (PA's, Inf., Post.)  [] (30216) Provided manual therapy to mobilize LE, proximal hip and/or LS spine soft tissue/joints for the purpose of modulating pain, promoting relaxation,  increasing ROM, reducing/eliminating soft tissue swelling/inflammation/restriction, improving soft tissue extensibility and allowing for proper ROM for normal function with self care, mobility, lifting and ambulation. Modalities:     [] GAME READY (VASO)- for significant edema, swelling, pain control.      Charges:  Timed Code Treatment Minutes: 41   Total Treatment Minutes: 43      [] EVAL (LOW) 37378 (typically 20 minutes face-to-face)  [] EVAL (MOD) 65863 (typically 30 minutes face-to-face)  [] EVAL (HIGH) 54323 (typically 45 minutes face-to-face)  [] RE-EVAL     [x] IB(45032) x 2    [] DRY NEEDLE 1 OR 2 MUSCLES  [] NMR (71584) x     [] DRY NEEDLE 3+ MUSCLES  [] Manual (41113) x       [x] TA (86254) x  1   [] [facility] Traction (16960)  [] ES(attended) (86414)     [] ES (un) (76953):   [] VASO (50082)  [] Other:      GOALS  Patient stated goal: \"be able to get in and out of the tractor\"  [] Progressing: [] Met: [x] Not Met: [] Adjusted    Therapist goals for Patient:   Short Term Goals: To be achieved in: 2 weeks  1. Independent in HEP and progression per patient tolerance, in order to prevent re-injury. [] Progressing: [] Met: [x] Not Met: [] Adjusted  2. Patient will have a decrease in pain to facilitate improvement in movement, function, and ADLs as indicated by Functional Deficits. [] Progressing: [] Met: [x] Not Met: [] Adjusted    Long Term Goals: To be achieved in: 8 weeks  1. Disability index score of 10% or less for the LEFS to assist with reaching prior level of function. [] Progressing: [] Met: [x] Not Met: [] Adjusted  2. Patient will demonstrate increased AROM to full LE baljeet to allow for proper joint functioning as indicated by patients Functional Deficits. [] Progressing: [] Met: [x] Not Met: [] Adjusted  3. Patient will demonstrate an increase in Strength to good proximal hip strength and control, within 5lb HHD in LE to allow for proper functional mobility as indicated by patients Functional Deficits. [] Progressing: [] Met: [x] Not Met: [] Adjusted  4. Patient will return to daily functional activities without increased symptoms or restriction. [] Progressing: [] Met: [x] Not Met: [] Adjusted  5. Patient will be able to stand from a standard height chair without increased symptoms or restrictions. [] Progressing: [] Met: [x] Not Met: [] Adjusted  5. BY DISCHARGE: Pt will be able to get in and out of the tractor at the end of the day without assistance or restrictions. 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  Pt recovered well with short sitting breaks. Pt does have difficult time w/ anterior weight shift 2/2 neuropathy. Requires cues for eccentric control and preventing hyperextension through knees. Has slight increase in difficulty w/ LLE single leg loading 2/2 history of stroke. Increased time for task completion and rest breaks. Continue to progress functional strength and balance to improve independence and decrease falls risk. Treatment/Activity Tolerance:  [x] Patient tolerated treatment well [] Patient limited by fatique  [] Patient limited by pain  [] Patient limited by other medical complications  [] Other:     Overall Progression Towards Functional goals/ Treatment Progress Update:  [] Patient is progressing as expected towards functional goals listed. [] Progression is slowed due to complexities/Impairments listed. [] Progression has been slowed due to co-morbidities. [x] Plan just implemented, too soon to assess goals progression <30days   [] Goals require adjustment due to lack of progress  [] Patient is not progressing as expected and requires additional follow up with physician  [] Other    Prognosis for POC: [x] Good [] Fair  [] Poor    Patient requires continued skilled intervention: [x] Yes  [] No        PLAN: 2x/week x8 weeks  [x] Continue per plan of care [] Alter current plan (see comments)  [] Plan of care initiated [] Hold pending MD visit [] Discharge    Electronically signed by: Chuy Correia PTA    Note: If patient does not return for scheduled/recommended follow up visits, this note will serve as a discharge from care along with the most recent update on progress.

## 2023-01-05 ENCOUNTER — HOSPITAL ENCOUNTER (OUTPATIENT)
Dept: PHYSICAL THERAPY | Age: 73
Setting detail: THERAPIES SERIES
Discharge: HOME | End: 2023-01-05
Payer: MEDICARE

## 2023-01-05 PROCEDURE — 97110 THERAPEUTIC EXERCISES: CPT

## 2023-01-05 PROCEDURE — 97530 THERAPEUTIC ACTIVITIES: CPT

## 2023-01-05 NOTE — FLOWSHEET NOTE
[place] [address] [facility] [address]  Phone: [phone] Fax: [phone]    Physical Therapy Treatment Note/ Progress Report:       Date:  2023    Patient Name:  Ken Sharma    :  1950  MRN: [id number]  Restrictions/Precautions:    Medical/Treatment Diagnosis Information:  Diagnosis: M62.81 muscle weakness, generalized  Treatment Diagnosis: L72.60  Insurance/Certification information:  PT Insurance Information: Medicare; [place] [facility] [place]; ded met; [place] visits  Physician Information:  [facility] signed (Y/N):     Date of Patient follow up with Physician:      Progress Report: []  Yes  [x]  No     Date Range for reporting period:  Beginnin2022  Ending:     Progress report due (10 Rx/or 30 days whichever is less):      Recertification due (POC duration/ or 90 days whichever is less): 2023     Visit # Insurance Allowable Auth Needed   6 [place] []Yes    [x]No     Latex Allergy:  [x]NO      []YES  Preferred Language for Healthcare:   [x]English       []other:    Functional Scale: FOTO: 49; Risk adjusted: 47; discharge score: 58   Date assessed:2022    Pain level:  1-2/10 low back     SUBJECTIVE:  Pt states that he is a little tired as he didn't sleep at all last night. Feeling fine after PT sessions without a lot of soreness.      OBJECTIVE:   Observation:   Test measurements:      RESTRICTIONS/PRECAUTIONS: hx of stroke- affected L side; L side vision limited; increased falls risk    Exercises/Interventions:     Therapeutic Ex (50625)   Min: 25 Sets/sec Reps Notes/CUES   Retro Stepper/BIKE 5'  Resistance 5 - increase as able   Alter G      Bridge   HEP   LAQ in chair 3 10 3lb   Hip rotations/abductions in chair 2 10 3lb   SLR   HEP   Supine hip abd iso   HEP; Black AK   Standing hip abd 3lb    Hip Ext /table 2 10     Standing SC rows 2 10 Staggered stance, light blue tube Leg Press DL 90#   Cybex HS curl      SC fwd/bwd walks SBA, 1 yellow SC around waist   Glute side walks 4 10 3#   Standing hamstring curl 2 10 3#   Slide Lunge      Slide HS eccentrics      Step ups/ecc step down      Swissball wall rolls- in SLS- hip drive      Quad hip ext/wall-ball rolls                  Manual Intervention (58418)  Min:      Knee mobs/PROM      Tib/Fem Mobs      Patella Mobs      Ankle mobs                  NMR re-education (19898)  Min: 0   CUES NEEDED   Rhomberg balance - airex Eyes open at half wall; mod difficulty   BFR      G. Med activation      Hip Ext full ROM/ G. Activation      Bosu Bal and Prop- G Med      Single leg stance/Balance/Prop      Bosu Retro G. Med act      Prone Hip froggers- sliders/elevated            Therapeutic Activity (93945)  Min: 16      Ladders      Plyos      Standing marches  2 10 3lb on ankles   Sit to stand 2 10 Chair+2 Airex- approx 50 deg knee flexion   Step up 2 5 [place]; half wall, 4in box   Mini squats at wall 1 10 BUE support from half wall       Therapeutic Exercise and NMR EXR  [x] (86077) Provided verbal/tactile cueing for activities related to strengthening, flexibility, endurance, ROM for improvements in LE, proximal hip, and core control with self care, mobility, lifting, ambulation. [x] (08671) Provided verbal/tactile cueing for activities related to improving balance, coordination, kinesthetic sense, posture, motor skill, proprioception  to assist with LE, proximal hip, and core control in self care, mobility, lifting, ambulation and eccentric single leg control.      NMR and Therapeutic Activities:    [x] (60115 or 28522) Provided verbal/tactile cueing for activities related to improving balance, coordination, kinesthetic sense, posture, motor skill, proprioception and motor activation to allow for proper function of core, proximal hip and LE with self care and ADLs and functional mobility.   [] (92721) Gait Re-education- Provided training and instruction to the patient for proper LE, core and proximal hip recruitment and positioning and eccentric body weight control with ambulation re-education including up and down stairs     Home Exercise Program:    [x] (44337) Reviewed/Progressed HEP activities related to strengthening, flexibility, endurance, ROM of core, proximal hip and LE for functional self-care, mobility, lifting and ambulation/stair navigation   [] (87948)Reviewed/Progressed HEP activities related to improving balance, coordination, kinesthetic sense, posture, motor skill, proprioception of core, proximal hip and LE for self care, mobility, lifting, and ambulation/stair navigation      Manual Treatments:  PROM / STM / Oscillations-Mobs:  G-I, II, III, IV (PA's, Inf., Post.)  [] (61656) Provided manual therapy to mobilize LE, proximal hip and/or LS spine soft tissue/joints for the purpose of modulating pain, promoting relaxation,  increasing ROM, reducing/eliminating soft tissue swelling/inflammation/restriction, improving soft tissue extensibility and allowing for proper ROM for normal function with self care, mobility, lifting and ambulation. Modalities:     [] GAME READY (VASO)- for significant edema, swelling, pain control. Charges:  Timed Code Treatment Minutes: 41   Total Treatment Minutes: 43      [] EVAL (LOW) 13992 (typically 20 minutes face-to-face)  [] EVAL (MOD) 86202 (typically 30 minutes face-to-face)  [] EVAL (HIGH) 10291 (typically 45 minutes face-to-face)  [] RE-EVAL     [x] XK(52738) x 2    [] DRY NEEDLE 1 OR 2 MUSCLES  [] NMR (49164) x     [] DRY NEEDLE 3+ MUSCLES  [] Manual (81869) x       [x] TA (86348) x  1   [] Mech Traction (97274)  [] ES(attended) (33013)     [] ES (un) (96864):   [] VASO (61260)  [] Other:      GOALS  Patient stated goal: \"be able to get in and out of the tractor\"  [] Progressing: [] Met: [x] Not Met: [] Adjusted    Therapist goals for Patient:   Short Term Goals: To be achieved in: 2 weeks  1. Independent in HEP and progression per patient tolerance, in order to prevent re-injury. [] Progressing: [] Met: [x] Not Met: [] Adjusted  2. Patient will have a decrease in pain to facilitate improvement in movement, function, and ADLs as indicated by Functional Deficits. [] Progressing: [] Met: [x] Not Met: [] Adjusted    Long Term Goals: To be achieved in: 8 weeks  1. Disability index score of 10% or less for the LEFS to assist with reaching prior level of function. [] Progressing: [] Met: [x] Not Met: [] Adjusted  2. Patient will demonstrate increased AROM to full LE baljeet to allow for proper joint functioning as indicated by patients Functional Deficits. [] Progressing: [] Met: [x] Not Met: [] Adjusted  3. Patient will demonstrate an increase in Strength to good proximal hip strength and control, within 5lb HHD in LE to allow for proper functional mobility as indicated by patients Functional Deficits. [] Progressing: [] Met: [x] Not Met: [] Adjusted  4. Patient will return to daily functional activities without increased symptoms or restriction. [] Progressing: [] Met: [x] Not Met: [] Adjusted  5. Patient will be able to stand from a standard height chair without increased symptoms or restrictions. [] Progressing: [] Met: [x] Not Met: [] Adjusted  5. BY DISCHARGE: Pt will be able to get in and out of the tractor at the end of the day without assistance or restrictions. [] Progressing: [] Met: [x] Not Met: [] Adjusted     ASSESSMENT: No soreness reported today but limited due to fatigue from not sleeping well last night. Challenged w/ lateral movements and finding proximal hip stabilization. Pt does have difficult time w/ anterior weight shift 2/2 neuropathy. Requires cues for eccentric control and preventing hyperextension through knees. Has slight increase in difficulty w/ LLE single leg loading 2/2 history of stroke. Increased time for task completion and rest breaks.  Continue to progress functional strength and balance to improve independence and decrease falls risk. Treatment/Activity Tolerance:  [x] Patient tolerated treatment well [] Patient limited by fatique  [] Patient limited by pain  [] Patient limited by other medical complications  [] Other:     Overall Progression Towards Functional goals/ Treatment Progress Update:  [] Patient is progressing as expected towards functional goals listed. [] Progression is slowed due to complexities/Impairments listed. [] Progression has been slowed due to co-morbidities. [x] Plan just implemented, too soon to assess goals progression <30days   [] Goals require adjustment due to lack of progress  [] Patient is not progressing as expected and requires additional follow up with physician  [] Other    Prognosis for POC: [x] Good [] Fair  [] Poor    Patient requires continued skilled intervention: [x] Yes  [] No        PLAN: 2x/week x8 weeks  [x] Continue per plan of care [] Alter current plan (see comments)  [] Plan of care initiated [] Hold pending MD visit [] Discharge    Electronically signed by: Kael Lloyd PT    Note: If patient does not return for scheduled/recommended follow up visits, this note will serve as a discharge from care along with the most recent update on progress.

## 2023-01-10 ENCOUNTER — HOSPITAL ENCOUNTER (OUTPATIENT)
Dept: PHYSICAL THERAPY | Age: 73
Setting detail: THERAPIES SERIES
Discharge: HOME | End: 2023-01-10
Payer: MEDICARE

## 2023-01-10 PROCEDURE — 97530 THERAPEUTIC ACTIVITIES: CPT

## 2023-01-10 PROCEDURE — 97110 THERAPEUTIC EXERCISES: CPT

## 2023-01-10 NOTE — FLOWSHEET NOTE
[address] [address] [facility]  Phone: [phone] Fax: [phone]    Physical Therapy Treatment Note/ Progress Report:       Date:  01/10/2023    Patient Name:  Lars Fothergill    :  1950  MRN: [id number]  Restrictions/Precautions:    Medical/Treatment Diagnosis Information:  Diagnosis: M62.81 muscle weakness, generalized  Treatment Diagnosis: W81.84  Insurance/Certification information:  PT Insurance Information: Medicare; [place]; ded met; [place] visits  Physician Information:  Tai [facility] signed (Y/N):     Date of Patient follow up with Physician:      Progress Report: []  Yes  [x]  No     Date Range for reporting period:  Beginnin2022  Ending:     Progress report due (10 Rx/or 30 days whichever is less):      Recertification due (POC duration/ or 90 days whichever is less): 2023     Visit # Insurance Allowable Auth Needed   7 [place] []Yes    [x]No     Latex Allergy:  [x]NO      []YES  Preferred Language for Healthcare:   [x]English       []other:    Functional Scale: FOTO: 49; Risk adjusted: 47; discharge score: 58   Date assessed:2022    Pain level:  1-2/10 low back     SUBJECTIVE:  Pt states that his back continues to tighten up from time to time. Typically if he sits down and rests for a little while it goes away. Pt states that he did his exercises this morning.      OBJECTIVE:   Observation:   Test measurements:      RESTRICTIONS/PRECAUTIONS: hx of stroke- affected L side; L side vision limited; increased falls risk    Exercises/Interventions:     Therapeutic Ex (63248)   Min: 30 Sets/sec Reps Notes/CUES   Retro Stepper/BIKE 5'  Resistance 5 - increase as able   Alter G      Bridge 1 10 Cues for core and glute engagement   LAQ in chair 3 10 3lb   Hip rotations/abductions in chair 2 10 3lb   SLR   HEP   Supine hip abd iso   HEP; Black AK   Standing hip abd 2 15 3lb    Hip Ext /table 2 15  3lb   Seated rows 2 10 Silver looped band   Supine LTR 1 10 For back mobility   Leg Press DL 3 10 90#   Cybex HS curl      SC fwd/bwd walks SBA, 1 yellow SC around waist   Glute side walks 4 10 3#   Standing hamstring curl 2 10 3#   Slide Lunge      Slide HS eccentrics      Step ups/ecc step down      Swissball wall rolls- in SLS- hip drive      Quad hip ext/wall-ball rolls                  Manual Intervention (16998)  Min:      Knee mobs/PROM      Tib/Fem Mobs      Patella Mobs      Ankle mobs                  NMR re-education (21623)  Min: 3   CUES NEEDED   Rhomberg balance - airex Eyes open at half wall; mod difficulty   Tandem stance 20sec 3 Level ground @ 1/2 wall   G. Med activation      Hip Ext full ROM/ G. Activation      Bosu Bal and Prop- G Med      Single leg stance/Balance/Prop      Bosu Retro G. Med act      Prone Hip froggers- sliders/elevated            Therapeutic Activity (73775)  Min: 12      Ladders      [place]      Standing marches  2 10 3lb on ankles   Sit to stand 2 10 Hi-lo elevated   Step up [place]; half wall, 4in box   Mini squats at wall BUE support from half wall       Therapeutic Exercise and NMR EXR  [x] (67467) Provided verbal/tactile cueing for activities related to strengthening, flexibility, endurance, ROM for improvements in LE, proximal hip, and core control with self care, mobility, lifting, ambulation. [x] (14992) Provided verbal/tactile cueing for activities related to improving balance, coordination, kinesthetic sense, posture, motor skill, proprioception  to assist with LE, proximal hip, and core control in self care, mobility, lifting, ambulation and eccentric single leg control.      NMR and Therapeutic Activities:    [x] (61591 or 88805) Provided verbal/tactile cueing for activities related to improving balance, coordination, kinesthetic sense, posture, motor skill, proprioception and motor activation to allow for proper function of core, proximal hip and LE with self care and ADLs and functional mobility.   [] (33884) Gait Re-education- Provided training and instruction to the patient for proper LE, core and proximal hip recruitment and positioning and eccentric body weight control with ambulation re-education including up and down stairs     Home Exercise Program:    [x] (10871) Reviewed/Progressed HEP activities related to strengthening, flexibility, endurance, ROM of core, proximal hip and LE for functional self-care, mobility, lifting and ambulation/stair navigation   [] (78182)Reviewed/Progressed HEP activities related to improving balance, coordination, kinesthetic sense, posture, motor skill, proprioception of core, proximal hip and LE for self care, mobility, lifting, and ambulation/stair navigation      Manual Treatments:  PROM / STM / Oscillations-Mobs:  G-I, II, III, IV (PA's, Inf., Post.)  [] (00327) Provided manual therapy to mobilize LE, proximal hip and/or LS spine soft tissue/joints for the purpose of modulating pain, promoting relaxation,  increasing ROM, reducing/eliminating soft tissue swelling/inflammation/restriction, improving soft tissue extensibility and allowing for proper ROM for normal function with self care, mobility, lifting and ambulation. Modalities:     [] GAME READY (VASO)- for significant edema, swelling, pain control.      Charges:  Timed Code Treatment Minutes: 45   Total Treatment Minutes: 45      [] EVAL (LOW) 84749 (typically 20 minutes face-to-face)  [] EVAL (MOD) 20697 (typically 30 minutes face-to-face)  [] EVAL (HIGH) 81076 (typically 45 minutes face-to-face)  [] RE-EVAL     [x] KE(84845) x 2    [] DRY NEEDLE 1 OR 2 MUSCLES  [] NMR (23162) x     [] DRY NEEDLE 3+ MUSCLES  [] Manual (86111) x       [x] TA (39634) x  1   [] Mech Traction (36680)  [] ES(attended) (83896)     [] ES (un) (13842):   [] VASO (61257)  [] Other:      GOALS  Patient stated goal: \"be able to get in and out of the tractor\"  [] Progressing: [] Met: [x] Not Met: [] Adjusted    Therapist goals for Patient:   Short Term Goals: To be achieved in: 2 weeks  1. Independent in HEP and progression per patient tolerance, in order to prevent re-injury. [] Progressing: [] Met: [x] Not Met: [] Adjusted  2. Patient will have a decrease in pain to facilitate improvement in movement, function, and ADLs as indicated by Functional Deficits. [] Progressing: [] Met: [x] Not Met: [] Adjusted    Long Term Goals: To be achieved in: 8 weeks  1. Disability index score of 10% or less for the LEFS to assist with reaching prior level of function. [] Progressing: [] Met: [x] Not Met: [] Adjusted  2. Patient will demonstrate increased AROM to full LE baljeet to allow for proper joint functioning as indicated by patients Functional Deficits. [] Progressing: [] Met: [x] Not Met: [] Adjusted  3. Patient will demonstrate an increase in Strength to good proximal hip strength and control, within 5lb HHD in LE to allow for proper functional mobility as indicated by patients Functional Deficits. [] Progressing: [] Met: [x] Not Met: [] Adjusted  4. Patient will return to daily functional activities without increased symptoms or restriction. [] Progressing: [] Met: [x] Not Met: [] Adjusted  5. Patient will be able to stand from a standard height chair without increased symptoms or restrictions. [] Progressing: [] Met: [x] Not Met: [] Adjusted  5. BY DISCHARGE: Pt will be able to get in and out of the tractor at the end of the day without assistance or restrictions. [] Progressing: [] Met: [x] Not Met: [] Adjusted     ASSESSMENT: Some intermittent low back soreness reported today; 1 brief bout after sit-to-stand exercise. Challenged w/ lateral movements and finding proximal hip stabilization. Pt does have difficult time w/ anterior weight shift 2/2 neuropathy. Requires cues for eccentric control and preventing hyperextension through knees.  Has slight increase in difficulty w/ LLE single leg loading 2/2 history of stroke. Increased time for task completion and rest breaks. Continue to progress functional strength and balance to improve independence and decrease falls risk. Treatment/Activity Tolerance:  [x] Patient tolerated treatment well [] Patient limited by fatique  [] Patient limited by pain  [] Patient limited by other medical complications  [] Other:     Overall Progression Towards Functional goals/ Treatment Progress Update:  [] Patient is progressing as expected towards functional goals listed. [] Progression is slowed due to complexities/Impairments listed. [] Progression has been slowed due to co-morbidities. [x] Plan just implemented, too soon to assess goals progression <30days   [] Goals require adjustment due to lack of progress  [] Patient is not progressing as expected and requires additional follow up with physician  [] Other    Prognosis for POC: [x] Good [] Fair  [] Poor    Patient requires continued skilled intervention: [x] Yes  [] No        PLAN: 2x/week x8 weeks  [x] Continue per plan of care [] Alter current plan (see comments)  [] Plan of care initiated [] Hold pending MD visit [] Discharge    Electronically signed by: Shayy Camarena PTA    Note: If patient does not return for scheduled/recommended follow up visits, this note will serve as a discharge from care along with the most recent update on progress.

## 2023-01-12 ENCOUNTER — HOSPITAL ENCOUNTER (OUTPATIENT)
Dept: PHYSICAL THERAPY | Age: 73
Setting detail: THERAPIES SERIES
Discharge: HOME | End: 2023-01-12
Payer: MEDICARE

## 2023-01-12 PROCEDURE — 97530 THERAPEUTIC ACTIVITIES: CPT

## 2023-01-12 PROCEDURE — 97110 THERAPEUTIC EXERCISES: CPT

## 2023-01-12 NOTE — FLOWSHEET NOTE
[place] [address] [facility], [address]  Phone: [phone] Fax: [phone]    Physical Therapy Treatment Note/ Progress Report:       Date:  2023    Patient Name:  Manolo Owen    :  1950  MRN: [id number]  Restrictions/Precautions:    Medical/Treatment Diagnosis Information:  Diagnosis: M62.81 muscle weakness, generalized  Treatment Diagnosis: V60.61  Insurance/Certification information:  PT Insurance Information: Medicare; [place]; ded met; [place] visits  Physician Information:  Tai [facility] signed (Y/N):     Date of Patient follow up with Physician:      Progress Report: []  Yes  [x]  No     Date Range for reporting period:  Beginnin2022  Ending:     Progress report due (10 Rx/or 30 days whichever is less): 9936     Recertification due (POC duration/ or 90 days whichever is less): 2023     Visit # Insurance Allowable Auth Needed   8 [place] []Yes    [x]No     Latex Allergy:  [x]NO      []YES  Preferred Language for Healthcare:   [x]English       []other:    Functional Scale: FOTO: 49; Risk adjusted: 47; discharge score: 58   Date assessed:2022    Pain level:  1-2/10 low back     SUBJECTIVE:  Pt states this back has felt okay the last few days. States it usually happens when he is lifting something.     OBJECTIVE:   Observation:   Test measurements:      RESTRICTIONS/PRECAUTIONS: hx of stroke- affected L side; L side vision limited; increased falls risk    Exercises/Interventions:     Therapeutic Ex ()   Min: 30 Sets/sec Reps Notes/CUES   Retro Stepper/BIKE 5'  Resistance 5 - increase as able   Alter G      Bridge Cues for core and glute engagement   LAQ in chair 3 10 5lb   Hip rotations/abductions in chair 2 10 5lb   SLR   HEP   Supine hip abd iso   HEP; Black AK   Standing hip abd 2 15 5lb    Hip Ext /table 2 15  5lb   Seated rows 2 10 Silver looped band   Supine LTR 1 10 For back mobility   Leg Press DL 3 10 90#   Cybex HS curl      SC fwd/bwd walks SBA, 1 yellow SC around waist   Glute side walks 4 10 3#   Standing hamstring curl 2 10 3#   Slide Lunge      Slide HS eccentrics      Step ups/ecc step down      Swissball wall rolls- in SLS- hip drive      Quad hip ext/wall-ball rolls      TB fwd seated ball rolls 1 10    TB lat seated ball rolls 1 10    Manual Intervention (49475)  Min:      Knee mobs/PROM      Tib/Fem Mobs      Patella Mobs      Ankle mobs                  NMR re-education (71475)  Min: 3   CUES NEEDED   Rhomberg balance - airex Eyes open at half wall; mod difficulty   Tandem stance 20sec 3 Level ground @ 1/2 wall   G. Med activation      Hip Ext full ROM/ G. Activation      Bosu Bal and Prop- G Med      Single leg stance/Balance/Prop      Bosu Retro G. Med act      Prone Hip froggers- sliders/elevated            Therapeutic Activity (92360)  Min: 15      Ladders      KB box tap 1 20 Green KB   Standing marches  2 10 3lb on ankles   Sit to stand 2 10 Hi-lo elevated   Step up [place]; half wall, 4in box   Mini squats at wall BUE support from half wall       Therapeutic Exercise and NMR EXR  [x] (45480) Provided verbal/tactile cueing for activities related to strengthening, flexibility, endurance, ROM for improvements in LE, proximal hip, and core control with self care, mobility, lifting, ambulation. [x] (69249) Provided verbal/tactile cueing for activities related to improving balance, coordination, kinesthetic sense, posture, motor skill, proprioception  to assist with LE, proximal hip, and core control in self care, mobility, lifting, ambulation and eccentric single leg control.      NMR and Therapeutic Activities:    [x] (41980 or 99562) Provided verbal/tactile cueing for activities related to improving balance, coordination, kinesthetic sense, posture, motor skill, proprioception and motor activation to allow for proper function of core, proximal hip and LE with self care and ADLs and functional mobility.   [] (27194) Gait Re-education- Provided training and instruction to the patient for proper LE, core and proximal hip recruitment and positioning and eccentric body weight control with ambulation re-education including up and down stairs     Home Exercise Program:    [x] (79166) Reviewed/Progressed HEP activities related to strengthening, flexibility, endurance, ROM of core, proximal hip and LE for functional self-care, mobility, lifting and ambulation/stair navigation   [] (64125)Reviewed/Progressed HEP activities related to improving balance, coordination, kinesthetic sense, posture, motor skill, proprioception of core, proximal hip and LE for self care, mobility, lifting, and ambulation/stair navigation      Manual Treatments:  PROM / STM / Oscillations-Mobs:  G-I, II, III, IV (PA's, Inf., Post.)  [] (11672) Provided manual therapy to mobilize LE, proximal hip and/or LS spine soft tissue/joints for the purpose of modulating pain, promoting relaxation,  increasing ROM, reducing/eliminating soft tissue swelling/inflammation/restriction, improving soft tissue extensibility and allowing for proper ROM for normal function with self care, mobility, lifting and ambulation. Modalities:     [] GAME READY (VASO)- for significant edema, swelling, pain control.      Charges:  Timed Code Treatment Minutes: 45   Total Treatment Minutes: 45      [] EVAL (LOW) 25622 (typically 20 minutes face-to-face)  [] EVAL (MOD) 43242 (typically 30 minutes face-to-face)  [] EVAL (HIGH) 05757 (typically 45 minutes face-to-face)  [] RE-EVAL     [x] TO(89945) x 2    [] DRY NEEDLE 1 OR 2 MUSCLES  [] NMR (96817) x     [] DRY NEEDLE 3+ MUSCLES  [] Manual (03657) x       [x] TA (90750) x  1   [] Mech Traction (28850)  [] ES(attended) (20149)     [] ES (un) (65495):   [] VASO (78144)  [] Other:      GOALS  Patient stated goal: \"be able to get in and out of the tractor\"  [] Progressing: [] Met: [x] Not Met: [] Adjusted    Therapist goals for Patient:   Short Term Goals: To be achieved in: 2 weeks  1. Independent in HEP and progression per patient tolerance, in order to prevent re-injury. [] Progressing: [] Met: [x] Not Met: [] Adjusted  2. Patient will have a decrease in pain to facilitate improvement in movement, function, and ADLs as indicated by Functional Deficits. [] Progressing: [] Met: [x] Not Met: [] Adjusted    Long Term Goals: To be achieved in: 8 weeks  1. Disability index score of 10% or less for the LEFS to assist with reaching prior level of function. [] Progressing: [] Met: [x] Not Met: [] Adjusted  2. Patient will demonstrate increased AROM to full LE baljeet to allow for proper joint functioning as indicated by patients Functional Deficits. [] Progressing: [] Met: [x] Not Met: [] Adjusted  3. Patient will demonstrate an increase in Strength to good proximal hip strength and control, within 5lb HHD in LE to allow for proper functional mobility as indicated by patients Functional Deficits. [] Progressing: [] Met: [x] Not Met: [] Adjusted  4. Patient will return to daily functional activities without increased symptoms or restriction. [] Progressing: [] Met: [x] Not Met: [] Adjusted  5. Patient will be able to stand from a standard height chair without increased symptoms or restrictions. [] Progressing: [] Met: [x] Not Met: [] Adjusted  5. BY DISCHARGE: Pt will be able to get in and out of the tractor at the end of the day without assistance or restrictions. [] Progressing: [] Met: [x] Not Met: [] Adjusted     ASSESSMENT: No back soreness today, reviewed HEP stretches. Back pain consistent w/ poor functional lifting mechanics due to decreased LE functional strength. Challenged w/ lateral movements and finding proximal hip stabilization. Pt does have difficult time w/ anterior weight shift 2/2 neuropathy. Requires cues for eccentric control and preventing hyperextension through knees.  Has slight increase in difficulty w/ LLE single leg loading 2/2 history of stroke. Increased time for task completion and rest breaks. Continue to progress functional strength and balance to improve independence and decrease falls risk. Treatment/Activity Tolerance:  [x] Patient tolerated treatment well [] Patient limited by fatique  [] Patient limited by pain  [] Patient limited by other medical complications  [] Other:     Overall Progression Towards Functional goals/ Treatment Progress Update:  [] Patient is progressing as expected towards functional goals listed. [] Progression is slowed due to complexities/Impairments listed. [] Progression has been slowed due to co-morbidities. [x] Plan just implemented, too soon to assess goals progression <30days   [] Goals require adjustment due to lack of progress  [] Patient is not progressing as expected and requires additional follow up with physician  [] Other    Prognosis for POC: [x] Good [] Fair  [] Poor    Patient requires continued skilled intervention: [x] Yes  [] No        PLAN: 2x/week x8 weeks  [x] Continue per plan of care [] Alter current plan (see comments)  [] Plan of care initiated [] Hold pending MD visit [] Discharge    Electronically signed by: Chase Arguello PT    Note: If patient does not return for scheduled/recommended follow up visits, this note will serve as a discharge from care along with the most recent update on progress.

## 2023-01-17 ENCOUNTER — HOSPITAL ENCOUNTER (OUTPATIENT)
Dept: PHYSICAL THERAPY | Age: 73
Setting detail: THERAPIES SERIES
Discharge: HOME | End: 2023-01-17
Payer: MEDICARE

## 2023-01-17 PROCEDURE — 97110 THERAPEUTIC EXERCISES: CPT

## 2023-01-17 PROCEDURE — 97530 THERAPEUTIC ACTIVITIES: CPT

## 2023-01-17 NOTE — FLOWSHEET NOTE
[place] [address] [address]  Phone: [phone] Fax: [phone]    Physical Therapy Treatment Note/ Progress Report:       Date:  2023    Patient Name:  Yesy Givens    :  1950  MRN: [id number]  Restrictions/Precautions:    Medical/Treatment Diagnosis Information:  Diagnosis: M62.81 muscle weakness, generalized  Treatment Diagnosis: T93.61  Insurance/Certification information:  PT Insurance Information: Medicare; [place] [facility] [place]; ded met; [place] visits  Physician Information:  Tai [facility] signed (Y/N):     Date of Patient follow up with Physician:      Progress Report: []  Yes  [x]  No     Date Range for reporting period:  Beginnin2022  Ending:     Progress report due (10 Rx/or 30 days whichever is less):      Recertification due (POC duration/ or 90 days whichever is less): 2023     Visit # Insurance Allowable Auth Needed   9 MN []Yes    [x]No     Latex Allergy:  [x]NO      []YES  Preferred Language for Healthcare:   [x]English       []other:    Functional Scale: FOTO: 49; Risk adjusted: 47; discharge score: 58   Date assessed:2022    Pain level:  1-2/10 low back     SUBJECTIVE:  Pt states this back has felt okay the last few days. States it usually happens when he is lifting something.     OBJECTIVE:   Observation:   Test measurements:      RESTRICTIONS/PRECAUTIONS: hx of stroke- affected L side; L side vision limited; increased falls risk    Exercises/Interventions:     Therapeutic Ex (14370)   Min: 30 Sets/sec Reps Notes/CUES   Retro Stepper/BIKE 5'  Resistance 5 - increase as able   Alter G      Bridge Cues for core and glute engagement   LAQ in chair 3 10 5lb   Hip rotations/abductions in chair 2 10 5lb   SLR   HEP   Supine hip abd iso   HEP; Black AK   Standing hip abd 2 15 5lb    Hip Ext /table 2 15  5lb   Seated rows 2 10 Silver looped band   Supine LTR 1 10 For back mobility   Leg Press DL 3 10 95#   Cybex HS curl      SC fwd/bwd walks SBA, 1 yellow SC around waist   Glute side walks 4 10 3#   Standing hamstring curl 2 10 3#   Slide Lunge      Slide HS eccentrics      Step ups/ecc step down      Swissball wall rolls- in SLS- hip drive      Quad hip ext/wall-ball rolls      TB fwd seated ball rolls 1 10    TB lat seated ball rolls 1 10    Manual Intervention (61188)  Min:      Knee mobs/PROM      Tib/Fem Mobs      Patella Mobs      Ankle mobs                  NMR re-education (41871)  Min: 3   CUES NEEDED   Rhomberg balance - airex Eyes open at half wall; mod difficulty   Tandem stance 20sec 3 Level ground @ 1/2 wall   G. Med activation      Hip Ext full ROM/ G. Activation      Bosu Bal and Prop- G Med      Single leg stance/Balance/Prop      Bosu Retro G. Med act      Prone Hip froggers- sliders/elevated            Therapeutic Activity (90472)  Min: 15      Ladders      KB box tap 1 20 Green KB   Standing marches  2 10 3lb on ankles   Sit to stand 2 10 Hi-lo elevated   Step up [place]; half wall, 4in box   Mini squats at wall BUE support from half wall       Therapeutic Exercise and NMR EXR  [x] (69603) Provided verbal/tactile cueing for activities related to strengthening, flexibility, endurance, ROM for improvements in LE, proximal hip, and core control with self care, mobility, lifting, ambulation. [x] (76831) Provided verbal/tactile cueing for activities related to improving balance, coordination, kinesthetic sense, posture, motor skill, proprioception  to assist with LE, proximal hip, and core control in self care, mobility, lifting, ambulation and eccentric single leg control.      NMR and Therapeutic Activities:    [x] (72887 or 97762) Provided verbal/tactile cueing for activities related to improving balance, coordination, kinesthetic sense, posture, motor skill, proprioception and motor activation to allow for proper function of core, proximal hip and LE with self care and ADLs and functional mobility.   [] (50225) Gait Re-education- Provided training and instruction to the patient for proper LE, core and proximal hip recruitment and positioning and eccentric body weight control with ambulation re-education including up and down stairs     Home Exercise Program:    [x] (07868) Reviewed/Progressed HEP activities related to strengthening, flexibility, endurance, ROM of core, proximal hip and LE for functional self-care, mobility, lifting and ambulation/stair navigation   [] (38281)Reviewed/Progressed HEP activities related to improving balance, coordination, kinesthetic sense, posture, motor skill, proprioception of core, proximal hip and LE for self care, mobility, lifting, and ambulation/stair navigation      Manual Treatments:  PROM / STM / Oscillations-Mobs:  G-I, II, III, IV (PA's, Inf., Post.)  [] (40465) Provided manual therapy to mobilize LE, proximal hip and/or LS spine soft tissue/joints for the purpose of modulating pain, promoting relaxation,  increasing ROM, reducing/eliminating soft tissue swelling/inflammation/restriction, improving soft tissue extensibility and allowing for proper ROM for normal function with self care, mobility, lifting and ambulation. Modalities:     [] GAME READY (VASO)- for significant edema, swelling, pain control.      Charges:  Timed Code Treatment Minutes: 45   Total Treatment Minutes: 45      [] EVAL (LOW) 42348 (typically 20 minutes face-to-face)  [] EVAL (MOD) 41700 (typically 30 minutes face-to-face)  [] EVAL (HIGH) 63717 (typically 45 minutes face-to-face)  [] RE-EVAL     [x] VQ(73991) x 2    [] DRY NEEDLE 1 OR 2 MUSCLES  [] NMR (68266) x     [] DRY NEEDLE 3+ MUSCLES  [] Manual (18298) x       [x] TA (62292) x  1   [] Mech Traction (49454)  [] ES(attended) (49639)     [] ES (un) (73145):   [] VASO (78369)  [] Other:      GOALS  Patient stated goal: \"be able to get in and out of the tractor\"  [] Progressing: [] Met: [x] Not Met: [] Adjusted    Therapist goals for Patient:   Short Term Goals: To be achieved in: 2 weeks  1. Independent in HEP and progression per patient tolerance, in order to prevent re-injury. [] Progressing: [] Met: [x] Not Met: [] Adjusted  2. Patient will have a decrease in pain to facilitate improvement in movement, function, and ADLs as indicated by Functional Deficits. [] Progressing: [] Met: [x] Not Met: [] Adjusted    Long Term Goals: To be achieved in: 8 weeks  1. Disability index score of 10% or less for the LEFS to assist with reaching prior level of function. [] Progressing: [] Met: [x] Not Met: [] Adjusted  2. Patient will demonstrate increased AROM to full LE baljeet to allow for proper joint functioning as indicated by patients Functional Deficits. [] Progressing: [] Met: [x] Not Met: [] Adjusted  3. Patient will demonstrate an increase in Strength to good proximal hip strength and control, within 5lb HHD in LE to allow for proper functional mobility as indicated by patients Functional Deficits. [] Progressing: [] Met: [x] Not Met: [] Adjusted  4. Patient will return to daily functional activities without increased symptoms or restriction. [] Progressing: [] Met: [x] Not Met: [] Adjusted  5. Patient will be able to stand from a standard height chair without increased symptoms or restrictions. [] Progressing: [] Met: [x] Not Met: [] Adjusted  5. BY DISCHARGE: Pt will be able to get in and out of the tractor at the end of the day without assistance or restrictions. [] Progressing: [] Met: [x] Not Met: [] Adjusted     ASSESSMENT: Overall patient demonstrating improved functional strength w/ ability to do sit to stand through deeper range without UE assist.  Pt does have difficult time w/ anterior weight shift 2/2 neuropathy. Requires cues for eccentric control and preventing hyperextension through knees. Has slight increase in difficulty w/ LLE single leg loading 2/2 history of stroke.  Increased time for task completion and rest breaks. Continue to progress functional strength and balance to improve independence and decrease falls risk. Treatment/Activity Tolerance:  [x] Patient tolerated treatment well [] Patient limited by fatique  [] Patient limited by pain  [] Patient limited by other medical complications  [] Other:     Overall Progression Towards Functional goals/ Treatment Progress Update:  [] Patient is progressing as expected towards functional goals listed. [] Progression is slowed due to complexities/Impairments listed. [] Progression has been slowed due to co-morbidities. [x] Plan just implemented, too soon to assess goals progression <30days   [] Goals require adjustment due to lack of progress  [] Patient is not progressing as expected and requires additional follow up with physician  [] Other    Prognosis for POC: [x] Good [] Fair  [] Poor    Patient requires continued skilled intervention: [x] Yes  [] No        PLAN: 2x/week x8 weeks  [x] Continue per plan of care [] Alter current plan (see comments)  [] Plan of care initiated [] Hold pending MD visit [] Discharge    Electronically signed by: Eliane Sinha PT    Note: If patient does not return for scheduled/recommended follow up visits, this note will serve as a discharge from care along with the most recent update on progress.

## 2023-01-19 ENCOUNTER — HOSPITAL ENCOUNTER (OUTPATIENT)
Dept: PHYSICAL THERAPY | Age: 73
Setting detail: THERAPIES SERIES
Discharge: HOME | End: 2023-01-19
Payer: MEDICARE

## 2023-01-19 PROCEDURE — 97112 NEUROMUSCULAR REEDUCATION: CPT

## 2023-01-19 PROCEDURE — 97110 THERAPEUTIC EXERCISES: CPT

## 2023-01-19 PROCEDURE — 97530 THERAPEUTIC ACTIVITIES: CPT

## 2023-01-19 NOTE — FLOWSHEET NOTE
[place] [address] [facility]  Phone: [phone] Fax: [phone]    Physical Therapy Treatment Note/ Progress Report:       Date:  2023    Patient Name:  Rupa Way    :  1950  MRN: [id number]  Restrictions/Precautions:    Medical/Treatment Diagnosis Information:  Diagnosis: M62.81 muscle weakness, generalized  Treatment Diagnosis: H09.21  Insurance/Certification information:  PT Insurance Information: Medicare; [place] [facility] [place]; ded met; [place] visits  Physician Information:  Tai [facility] signed (Y/N):     Date of Patient follow up with Physician:      Progress Report: []  Yes  [x]  No     Date Range for reporting period:  Beginnin2022  Ending:     Progress report due (10 Rx/or 30 days whichever is less): 3/37/8242     Recertification due (POC duration/ or 90 days whichever is less): 2023     Visit # Insurance Allowable Auth Needed   10 [place] []Yes    [x]No     Latex Allergy:  [x]NO      []YES  Preferred Language for Healthcare:   [x]English       []other:    Functional Scale: FOTO: 49; Risk adjusted: 47; discharge score: 58   Date assessed:2022    Pain level:  1-2/10 low back     SUBJECTIVE:  Pt states his back was sore Tuesday night due [place].      OBJECTIVE:   Observation:   Test measurements:      RESTRICTIONS/PRECAUTIONS: hx of stroke- affected L side; L side vision limited; increased falls risk    Exercises/Interventions:     Therapeutic Ex (03318)   Min: 20 Sets/sec Reps Notes/CUES   Retro Stepper/BIKE 5'  Resistance 5 - increase as able   Alter G      Bridge Cues for core and glute engagement   LAQ in chair 5lb   Hip rotations/abductions in chair 5lb   SLR   HEP   Supine hip abd iso   HEP; Black AK   Standing hip abd 2 15 5lb    Hip Ext /table 2 15  5lb   Seated rows Silver looped band   Supine LTR For back mobility   Leg Press DL 3 10 100#   Cybex HS curl      SC fwd/bwd walks SBA, 1 yellow SC around waist   Glute side walks 4 10 3#   Standing hamstring curl 2 10 3#   Slide Lunge      Slide HS eccentrics      Step ups/ecc step down      Swissball wall rolls- in SLS- hip drive      Quad hip ext/wall-ball rolls      TB fwd seated ball rolls 1 10    TB lat seated ball rolls 1 10    Manual Intervention (03548)  Min:      Knee mobs/PROM      Tib/Fem Mobs      Patella Mobs      Ankle mobs                  NMR re-education (49822)  Min: 10   CUES NEEDED   Rhomberg balance - airex Eyes open at half wall; mod difficulty   Tandem stance 20sec 3 Level ground @ 1/2 wall   Line walking 2 15' Lat; fwd; working on weight shift   Hip Ext full ROM/ G. Activation      Bosu Bal and Prop- G Med      Single leg stance/Balance/Prop      Bosu Retro G. Med act      Prone Hip froggers- sliders/elevated            Therapeutic Activity (14830)  Min: 15      Ladders      KB box tap 1 20 Green KB   Standing marches  2 10 3lb on ankles   Sit to stand w/ OH reach 2 8 Chair + 2 airex   Step up [place]; half wall, 4in box   Mini squats at wall BUE support from half wall       Therapeutic Exercise and NMR EXR  [x] (38117) Provided verbal/tactile cueing for activities related to strengthening, flexibility, endurance, ROM for improvements in LE, proximal hip, and core control with self care, mobility, lifting, ambulation. [x] (46270) Provided verbal/tactile cueing for activities related to improving balance, coordination, kinesthetic sense, posture, motor skill, proprioception  to assist with LE, proximal hip, and core control in self care, mobility, lifting, ambulation and eccentric single leg control.      NMR and Therapeutic Activities:    [x] (38800 or 99568) Provided verbal/tactile cueing for activities related to improving balance, coordination, kinesthetic sense, posture, motor skill, proprioception and motor activation to allow for proper function of core, proximal hip and LE with self care and ADLs and functional mobility.   [] (88197) Gait Re-education- Provided training and instruction to the patient for proper LE, core and proximal hip recruitment and positioning and eccentric body weight control with ambulation re-education including up and down stairs     Home Exercise Program:    [x] (26106) Reviewed/Progressed HEP activities related to strengthening, flexibility, endurance, ROM of core, proximal hip and LE for functional self-care, mobility, lifting and ambulation/stair navigation   [] (88641)Reviewed/Progressed HEP activities related to improving balance, coordination, kinesthetic sense, posture, motor skill, proprioception of core, proximal hip and LE for self care, mobility, lifting, and ambulation/stair navigation      Manual Treatments:  PROM / STM / Oscillations-Mobs:  G-I, II, III, IV (PA's, Inf., Post.)  [] (98307) Provided manual therapy to mobilize LE, proximal hip and/or LS spine soft tissue/joints for the purpose of modulating pain, promoting relaxation,  increasing ROM, reducing/eliminating soft tissue swelling/inflammation/restriction, improving soft tissue extensibility and allowing for proper ROM for normal function with self care, mobility, lifting and ambulation. Modalities:     [] GAME READY (VASO)- for significant edema, swelling, pain control.      Charges:  Timed Code Treatment Minutes: 45   Total Treatment Minutes: 45      [] EVAL (LOW) 55862 (typically 20 minutes face-to-face)  [] EVAL (MOD) 68173 (typically 30 minutes face-to-face)  [] EVAL (HIGH) 65788 (typically 45 minutes face-to-face)  [] RE-EVAL     [x] BP(86782) x 1  [] DRY NEEDLE 1 OR 2 MUSCLES  [x] NMR (43390) x  1   [] DRY NEEDLE 3+ MUSCLES  [] Manual (87356) x       [x] TA (28064) x  1   [] Mech Traction (95159)  [] ES(attended) (07316)     [] ES (un) (50742):   [] VASO (27587)  [] Other:      GOALS  Patient stated goal: \"be able to get in and out of the tractor\"  [] Progressing: [] Met: [x] Not Met: [] Adjusted    Therapist goals for Patient:   Short Term Goals: To be achieved in: 2 weeks  1. Independent in HEP and progression per patient tolerance, in order to prevent re-injury. [] Progressing: [] Met: [x] Not Met: [] Adjusted  2. Patient will have a decrease in pain to facilitate improvement in movement, function, and ADLs as indicated by Functional Deficits. [] Progressing: [] Met: [x] Not Met: [] Adjusted    Long Term Goals: To be achieved in: 8 weeks  1. Disability index score of 10% or less for the LEFS to assist with reaching prior level of function. [] Progressing: [] Met: [x] Not Met: [] Adjusted  2. Patient will demonstrate increased AROM to full LE baljeet to allow for proper joint functioning as indicated by patients Functional Deficits. [] Progressing: [] Met: [x] Not Met: [] Adjusted  3. Patient will demonstrate an increase in Strength to good proximal hip strength and control, within 5lb HHD in LE to allow for proper functional mobility as indicated by patients Functional Deficits. [] Progressing: [] Met: [x] Not Met: [] Adjusted  4. Patient will return to daily functional activities without increased symptoms or restriction. [] Progressing: [] Met: [x] Not Met: [] Adjusted  5. Patient will be able to stand from a standard height chair without increased symptoms or restrictions. [] Progressing: [] Met: [x] Not Met: [] Adjusted  5. BY DISCHARGE: Pt will be able to get in and out of the tractor at the end of the day without assistance or restrictions. [] Progressing: [] Met: [x] Not Met: [] Adjusted     ASSESSMENT: Overall patient demonstrating improved functional strength w/ tolerance to increased load/external resistance. Is challenged w/ balance likely increased due to history of co morbidities. Pt does have difficult time w/ anterior weight shift 2/2 neuropathy. Requires cues for eccentric control and preventing hyperextension through knees.  Has slight increase in difficulty w/ LLE single leg loading 2/2 history of stroke. Increased time for task completion and rest breaks. Continue to progress functional strength and balance to improve independence and decrease falls risk. Treatment/Activity Tolerance:  [x] Patient tolerated treatment well [] Patient limited by fatique  [] Patient limited by pain  [] Patient limited by other medical complications  [] Other:     Overall Progression Towards Functional goals/ Treatment Progress Update:  [x] Patient is progressing as expected towards functional goals listed. [] Progression is slowed due to complexities/Impairments listed. [] Progression has been slowed due to co-morbidities. [] Plan just implemented, too soon to assess goals progression <30days   [] Goals require adjustment due to lack of progress  [] Patient is not progressing as expected and requires additional follow up with physician  [] Other    Prognosis for POC: [x] Good [] Fair  [] Poor    Patient requires continued skilled intervention: [x] Yes  [] No        PLAN: 2x/week x8 weeks  [x] Continue per plan of care [] Alter current plan (see comments)  [] Plan of care initiated [] Hold pending MD visit [] Discharge    Electronically signed by: Jai Antonio PT    Note: If patient does not return for scheduled/recommended follow up visits, this note will serve as a discharge from care along with the most recent update on progress.

## 2023-01-24 ENCOUNTER — HOSPITAL ENCOUNTER (OUTPATIENT)
Dept: PHYSICAL THERAPY | Age: 73
Setting detail: THERAPIES SERIES
Discharge: HOME | End: 2023-01-24
Payer: MEDICARE

## 2023-01-24 PROCEDURE — 97112 NEUROMUSCULAR REEDUCATION: CPT

## 2023-01-24 PROCEDURE — 97110 THERAPEUTIC EXERCISES: CPT

## 2023-01-24 NOTE — FLOWSHEET NOTE
[place] [address] [facility], [address]  Phone: [phone] Fax: [phone]    Physical Therapy Treatment Note/ Progress Report:       Date:  2023    Patient Name:  Ken Sharma    :  1950  MRN: [id number]  Restrictions/Precautions:    Medical/Treatment Diagnosis Information:  Diagnosis: M62.81 muscle weakness, generalized  Treatment Diagnosis: C80.59  Insurance/Certification information:  PT Insurance Information: Medicare; [employer] [place]; ded met; [place] visits  Physician Information:  [facility] signed (Y/N):     Date of Patient follow up with Physician:      Progress Report: []  Yes  [x]  No     Date Range for reporting period:  Beginnin2022  Ending:     Progress report due (10 Rx/or 30 days whichever is less):      Recertification due (POC duration/ or 90 days whichever is less): 2023     Visit # Insurance Allowable Auth Needed   11 [place] []Yes    [x]No     Latex Allergy:  [x]NO      []YES  Preferred Language for Healthcare:   [x]English       []other:    Functional Scale: FOTO: 49; Risk adjusted: 47; discharge score: 58   Date assessed:2022    Pain level:  1-2/10 low back     SUBJECTIVE:  Pt reports that he will be wearing a heart monitor next month for his increased pulse rate. Pt bough his pulse oximeter with him today since that has a monitor on that.       OBJECTIVE:   Observation:   Test measurements:      RESTRICTIONS/PRECAUTIONS: hx of stroke- affected L side; L side vision limited; increased falls risk    Exercises/Interventions:     Therapeutic Ex (84192)   Min: 24 Sets/sec Reps Notes/CUES   Retro Stepper/BIKE 5'  Resistance 5 - increase as able   Alter G      Bridge Cues for core and glute engagement   LAQ in chair 5lb   Hip rotations/abductions in chair 5lb   SLR   HEP   Supine hip abd iso   HEP; Black AK   Standing hip abd 2 15 4lb    Hip Ext /table 2 15  4lb Seated rows Silver looped band   Supine LTR For back mobility   Leg Press DL 3 10 100#   Cybex HS curl      SC fwd/bwd walks SBA, 1 yellow SC around waist   Glute side walks 4 10 3#   Standing hamstring curl 3#         Slide HS eccentrics      Step ups/ecc step down      Swissball wall rolls- in SLS- hip drive      Quad hip ext/wall-ball rolls      TB fwd seated ball rolls    TB lat seated ball rolls    Manual Intervention (58579)  Min:      Knee mobs/PROM      Tib/Fem Mobs      Patella Mobs      Ankle mobs                  NMR re-education (59410)  Min: 16   CUES NEEDED   Rhomberg balance - airex Eyes open at half wall; mod difficulty   Tandem stance 20sec 3 Level ground @ 1/2 wall   Line walking 2 15' Lat; fwd; working on weight shift   ST walks 2 20 Medium ST down line   NBOS on Airex+head turns at wall 2 10    Airex marches 2 10 At wall, fingertip support   Bosu Retro G. Med act      Prone Hip froggers- sliders/elevated            Therapeutic Activity (35713)  Min: 5      Ladders      KB box tap 1 20 Green KB   Standing marches  2 10 3lb on ankles   Sit to stand w/ OH reach 2 8 Chair + 2 airex   Step up [place]; half wall, 4in box   Mini squats at wall BUE support from half wall       Therapeutic Exercise and NMR EXR  [x] (77047) Provided verbal/tactile cueing for activities related to strengthening, flexibility, endurance, ROM for improvements in LE, proximal hip, and core control with self care, mobility, lifting, ambulation. [x] (26747) Provided verbal/tactile cueing for activities related to improving balance, coordination, kinesthetic sense, posture, motor skill, proprioception  to assist with LE, proximal hip, and core control in self care, mobility, lifting, ambulation and eccentric single leg control.      NMR and Therapeutic Activities:    [x] (82335 or 62804) Provided verbal/tactile cueing for activities related to improving balance, coordination, kinesthetic sense, posture, motor skill, proprioception and motor activation to allow for proper function of core, proximal hip and LE with self care and ADLs and functional mobility.   [] (40828) Gait Re-education- Provided training and instruction to the patient for proper LE, core and proximal hip recruitment and positioning and eccentric body weight control with ambulation re-education including up and down stairs     Home Exercise Program:    [x] (09296) Reviewed/Progressed HEP activities related to strengthening, flexibility, endurance, ROM of core, proximal hip and LE for functional self-care, mobility, lifting and ambulation/stair navigation   [] (07878)Reviewed/Progressed HEP activities related to improving balance, coordination, kinesthetic sense, posture, motor skill, proprioception of core, proximal hip and LE for self care, mobility, lifting, and ambulation/stair navigation      Manual Treatments:  PROM / STM / Oscillations-Mobs:  G-I, II, III, IV (PA's, Inf., Post.)  [] (73961) Provided manual therapy to mobilize LE, proximal hip and/or LS spine soft tissue/joints for the purpose of modulating pain, promoting relaxation,  increasing ROM, reducing/eliminating soft tissue swelling/inflammation/restriction, improving soft tissue extensibility and allowing for proper ROM for normal function with self care, mobility, lifting and ambulation. Modalities:     [] GAME READY (VASO)- for significant edema, swelling, pain control.      Charges:  Timed Code Treatment Minutes: 45   Total Treatment Minutes: 45      [] EVAL (LOW) 75088 (typically 20 minutes face-to-face)  [] EVAL (MOD) 78046 (typically 30 minutes face-to-face)  [] EVAL (HIGH) 89333 (typically 45 minutes face-to-face)  [] RE-EVAL     [x] UL(62677) x 2  [] DRY NEEDLE 1 OR 2 MUSCLES  [x] NMR (43394) x  1   [] DRY NEEDLE 3+ MUSCLES  [] Manual (88654) x       [x] TA (70701) x     [] Mech Traction (28107)  [] ES(attended) (57335)     [] ES (un) (89760):   [] VASO (34278)  [] Other:      GOALS  Patient stated goal: \"be able to get in and out of the tractor\"  [] Progressing: [] Met: [x] Not Met: [] Adjusted    Therapist goals for Patient:   Short Term Goals: To be achieved in: 2 weeks  1. Independent in HEP and progression per patient tolerance, in order to prevent re-injury.   [] Progressing: [] Met: [x] Not Met: [] Adjusted  2. Patient will have a decrease in pain to facilitate improvement in movement, function, and ADLs as indicated by Functional Deficits.  [] Progressing: [] Met: [x] Not Met: [] Adjusted    Long Term Goals: To be achieved in: 8 weeks  1. Disability index score of 10% or less for the LEFS to assist with reaching prior level of function.   [] Progressing: [] Met: [x] Not Met: [] Adjusted  2. Patient will demonstrate increased AROM to full LE baljeet to allow for proper joint functioning as indicated by patients Functional Deficits.   [] Progressing: [] Met: [x] Not Met: [] Adjusted  3. Patient will demonstrate an increase in Strength to good proximal hip strength and control, within 5lb HHD in LE to allow for proper functional mobility as indicated by patients Functional Deficits.   [] Progressing: [] Met: [x] Not Met: [] Adjusted  4. Patient will return to daily functional activities without increased symptoms or restriction.   [] Progressing: [] Met: [x] Not Met: [] Adjusted  5. Patient will be able to stand from a standard height chair without increased symptoms or restrictions.  [] Progressing: [] Met: [x] Not Met: [] Adjusted  5. BY DISCHARGE: Pt will be able to get in and out of the tractor at the end of the day without assistance or restrictions.  [] Progressing: [] Met: [x] Not Met: [] Adjusted     ASSESSMENT: Good tolerance to balance work today.  Is challenged w/ balance likely increased due to history of co morbidities. Pt does have difficult time w/ anterior weight shift 2/2 neuropathy. Requires cues for eccentric control and preventing hyperextension through knees. Has slight increase in  difficulty w/ LLE single leg loading 2/2 history of stroke. Increased time for task completion and rest breaks. Continue to progress functional strength and balance to improve independence and decrease falls risk. Treatment/Activity Tolerance:  [x] Patient tolerated treatment well [] Patient limited by fatique  [] Patient limited by pain  [] Patient limited by other medical complications  [] Other:     Overall Progression Towards Functional goals/ Treatment Progress Update:  [x] Patient is progressing as expected towards functional goals listed. [] Progression is slowed due to complexities/Impairments listed. [] Progression has been slowed due to co-morbidities. [] Plan just implemented, too soon to assess goals progression <30days   [] Goals require adjustment due to lack of progress  [] Patient is not progressing as expected and requires additional follow up with physician  [] Other    Prognosis for POC: [x] Good [] Fair  [] Poor    Patient requires continued skilled intervention: [x] Yes  [] No        PLAN: 2x/week x8 weeks  [x] Continue per plan of care [] Alter current plan (see comments)  [] Plan of care initiated [] Hold pending MD visit [] Discharge    Electronically signed by: Arlette Obrien PTA    Note: If patient does not return for scheduled/recommended follow up visits, this note will serve as a discharge from care along with the most recent update on progress.

## 2023-01-26 ENCOUNTER — HOSPITAL ENCOUNTER (OUTPATIENT)
Dept: PHYSICAL THERAPY | Age: 73
Setting detail: THERAPIES SERIES
Discharge: HOME | End: 2023-01-26
Payer: MEDICARE

## 2023-01-26 PROCEDURE — 97112 NEUROMUSCULAR REEDUCATION: CPT

## 2023-01-26 PROCEDURE — 97110 THERAPEUTIC EXERCISES: CPT

## 2023-01-26 NOTE — FLOWSHEET NOTE
[place] [address] [facility]  Phone: [phone] Fax: [phone]    Physical Therapy Treatment Note/ Progress Report:       Date:  2023    Patient Name:  Jagjit Morales    :  1950  MRN: [id number]  Restrictions/Precautions:    Medical/Treatment Diagnosis Information:  Diagnosis: M62.81 muscle weakness, generalized  Treatment Diagnosis: Z28.66  Insurance/Certification information:  PT Insurance Information: Medicare; [employer] [place]; ded met; [place] visits  Physician Information:  [facility] signed (Y/N):     Date of Patient follow up with Physician:      Progress Report: []  Yes  [x]  No     Date Range for reporting period:  Beginnin2022  Ending:     Progress report due (10 Rx/or 30 days whichever is less):      Recertification due (POC duration/ or 90 days whichever is less): 2023     Visit # Insurance Allowable Auth Needed   12 [place] []Yes    [x]No     Latex Allergy:  [x]NO      []YES  Preferred Language for Healthcare:   [x]English       []other:    Functional Scale: FOTO: 49; Risk adjusted: 47; discharge score: 58   Date assessed:2022    Pain level:  1/10 low back     SUBJECTIVE:  Pt reports that he did ok after last visit. Pt did some exercises at home this morning but did not have ankle weights to use. Pt reports that he is unable to get his foot up on top of the opposite knee while sitting and is wondering what exercises he can do to work on that.      OBJECTIVE:   Observation:   Test measurements:      RESTRICTIONS/PRECAUTIONS: hx of stroke- affected L side; L side vision limited; increased falls risk    Exercises/Interventions:     Therapeutic Ex (91114)   Min: 34 Sets/sec Reps Notes/CUES   Retro Stepper/BIKE 5'  Resistance 5 - increase as able   Alter G      Bridge Cues for core and glute engagement   LAQ in chair 3 10 4lb   Hip rotations/abductions in chair 2 10 4lb   SLR   HEP   Supine hip abd iso   HEP; Black AK   Standing hip abd 2 15 4lb    Standing Hip ext with forearms on wall 2 15  Airex under forearms, 4lb   Seated rows Silver looped band   Supine LTR For back mobility   Leg Press SL 2 15 70#   Cybex HS curl      SC fwd/bwd walks SBA, 1 yellow SC around waist   Glute side walks 4 10 3#   Standing hamstring curl 3#   Supine fig 4 hip stretch 10sec 5 bilat   Seated EOB heel slides up shin 1 10 Pt seated EOB leaning back with hands on table    Step ups/ecc step down      Swissball wall rolls- in SLS- hip drive      Quad hip ext/wall-ball rolls      TB fwd seated ball rolls    TB lat seated ball rolls    Manual Intervention (10123)  Min:      Knee mobs/PROM      Tib/Fem Mobs      Patella Mobs      Ankle mobs                  NMR re-education (36738)  Min: 10   CUES NEEDED   Rhomberg balance - airex Eyes open at half wall; mod difficulty   Tandem stance 20sec 3 Level ground @ 1/2 wall   Line walking 2 15' Lat; fwd; working on weight shift   ST walks 2 20 Medium ST down line   NBOS on Airex+head turns at wall 2 10    Airex marches 2 10 At wall, fingertip support   Toe touches on box  2 10 8in box   Prone Hip froggers- sliders/elevated            Therapeutic Activity (03462)  Min:       Ladders      KB box tap 1 20 Green KB   Standing marches  2 10 3lb on ankles   Sit to stand w/ OH reach 2 8 Chair + 2 airex   Step up [place]; half wall, 4in box   Mini squats at wall BUE support from half wall       Therapeutic Exercise and NMR EXR  [x] (90850) Provided verbal/tactile cueing for activities related to strengthening, flexibility, endurance, ROM for improvements in LE, proximal hip, and core control with self care, mobility, lifting, ambulation.   [x] (22760) Provided verbal/tactile cueing for activities related to improving balance, coordination, kinesthetic sense, posture, motor skill, proprioception  to assist with LE, proximal hip, and core control in self care, mobility, lifting, ambulation and eccentric single leg control. NMR and Therapeutic Activities:    [x] (24166 or 90273) Provided verbal/tactile cueing for activities related to improving balance, coordination, kinesthetic sense, posture, motor skill, proprioception and motor activation to allow for proper function of core, proximal hip and LE with self care and ADLs and functional mobility.   [] (97476) Gait Re-education- Provided training and instruction to the patient for proper LE, core and proximal hip recruitment and positioning and eccentric body weight control with ambulation re-education including up and down stairs     Home Exercise Program:    [x] (01970) Reviewed/Progressed HEP activities related to strengthening, flexibility, endurance, ROM of core, proximal hip and LE for functional self-care, mobility, lifting and ambulation/stair navigation   [] (90786)Reviewed/Progressed HEP activities related to improving balance, coordination, kinesthetic sense, posture, motor skill, proprioception of core, proximal hip and LE for self care, mobility, lifting, and ambulation/stair navigation      Manual Treatments:  PROM / STM / Oscillations-Mobs:  G-I, II, III, IV (PA's, Inf., Post.)  [] (10884) Provided manual therapy to mobilize LE, proximal hip and/or LS spine soft tissue/joints for the purpose of modulating pain, promoting relaxation,  increasing ROM, reducing/eliminating soft tissue swelling/inflammation/restriction, improving soft tissue extensibility and allowing for proper ROM for normal function with self care, mobility, lifting and ambulation. Modalities:     [] GAME READY (VASO)- for significant edema, swelling, pain control.      Charges:  Timed Code Treatment Minutes: 44   Total Treatment Minutes: 44      [] EVAL (LOW) 54997 (typically 20 minutes face-to-face)  [] EVAL (MOD) 70549 (typically 30 minutes face-to-face)  [] EVAL (HIGH) 04397 (typically 45 minutes face-to-face)  [] RE-EVAL     [x] DJ(16417) x 2  [] DRY NEEDLE 1 OR 2 MUSCLES  [x] NMR (62565) x  1   [] DRY NEEDLE 3+ MUSCLES  [] Manual (24509) x       [] TA (95499) x     [] Mech Traction (11575)  [] ES(attended) (86444)     [] ES (un) (73749):   [] VASO (59974)  [] Other:      GOALS  Patient stated goal: \"be able to get in and out of the tractor\"  [] Progressing: [] Met: [x] Not Met: [] Adjusted    Therapist goals for Patient:   Short Term Goals: To be achieved in: 2 weeks  1. Independent in HEP and progression per patient tolerance, in order to prevent re-injury. [] Progressing: [] Met: [x] Not Met: [] Adjusted  2. Patient will have a decrease in pain to facilitate improvement in movement, function, and ADLs as indicated by Functional Deficits. [] Progressing: [] Met: [x] Not Met: [] Adjusted    Long Term Goals: To be achieved in: 8 weeks  1. Disability index score of 10% or less for the LEFS to assist with reaching prior level of function. [] Progressing: [] Met: [x] Not Met: [] Adjusted  2. Patient will demonstrate increased AROM to full LE baljeet to allow for proper joint functioning as indicated by patients Functional Deficits. [] Progressing: [] Met: [x] Not Met: [] Adjusted  3. Patient will demonstrate an increase in Strength to good proximal hip strength and control, within 5lb HHD in LE to allow for proper functional mobility as indicated by patients Functional Deficits. [] Progressing: [] Met: [x] Not Met: [] Adjusted  4. Patient will return to daily functional activities without increased symptoms or restriction. [] Progressing: [] Met: [x] Not Met: [] Adjusted  5. Patient will be able to stand from a standard height chair without increased symptoms or restrictions. [] Progressing: [] Met: [x] Not Met: [] Adjusted  5. BY DISCHARGE: Pt will be able to get in and out of the tractor at the end of the day without assistance or restrictions.   [] Progressing: [] Met: [x] Not Met: [] Adjusted     ASSESSMENT: Good tolerance to balance work today; fewer sitting rest breaks needed. Is challenged w/ balance likely increased due to history of co morbidities. Pt does have difficult time w/ anterior weight shift 2/2 neuropathy. Requires cues for eccentric control and preventing hyperextension through knees. Has slight increase in difficulty w/ LLE single leg loading 2/2 history of stroke. Increased time for task completion and rest breaks. Continue to progress functional strength and balance to improve independence and decrease falls risk. Treatment/Activity Tolerance:  [x] Patient tolerated treatment well [] Patient limited by fatique  [] Patient limited by pain  [] Patient limited by other medical complications  [] Other:     Overall Progression Towards Functional goals/ Treatment Progress Update:  [x] Patient is progressing as expected towards functional goals listed. [] Progression is slowed due to complexities/Impairments listed. [] Progression has been slowed due to co-morbidities. [] Plan just implemented, too soon to assess goals progression <30days   [] Goals require adjustment due to lack of progress  [] Patient is not progressing as expected and requires additional follow up with physician  [] Other    Prognosis for POC: [x] Good [] Fair  [] Poor    Patient requires continued skilled intervention: [x] Yes  [] No        PLAN: 2x/week x8 weeks  [x] Continue per plan of care [] Alter current plan (see comments)  [] Plan of care initiated [] Hold pending MD visit [] Discharge    Electronically signed by: Darcy Patel PTA    Note: If patient does not return for scheduled/recommended follow up visits, this note will serve as a discharge from care along with the most recent update on progress.

## 2023-01-31 ENCOUNTER — HOSPITAL ENCOUNTER (OUTPATIENT)
Dept: PHYSICAL THERAPY | Age: 73
Setting detail: THERAPIES SERIES
Discharge: HOME | End: 2023-01-31
Payer: MEDICARE

## 2023-01-31 PROCEDURE — 97112 NEUROMUSCULAR REEDUCATION: CPT

## 2023-01-31 PROCEDURE — 97110 THERAPEUTIC EXERCISES: CPT

## 2023-01-31 NOTE — FLOWSHEET NOTE
[place] [address] [facility]  Phone: [phone] Fax: [phone]    Physical Therapy Treatment Note/ Progress Report:       Date:  2023    Patient Name:  Osmin Hill    :  1950  MRN: [id number]  Restrictions/Precautions:    Medical/Treatment Diagnosis Information:  Diagnosis: M62.81 muscle weakness, generalized  Treatment Diagnosis: R76.63  Insurance/Certification information:  PT Insurance Information: Medicare; [place]; ded met; [place] visits  Physician Information:  Tai  [facility] signed (Y/N):     Date of Patient follow up with Physician:      Progress Report: []  Yes  [x]  No     Date Range for reporting period:  Beginnin2022  Ending:     Progress report due (10 Rx/or 30 days whichever is less): 191     Recertification due (POC duration/ or 90 days whichever is less): 2023     Visit # Insurance Allowable Auth Needed   13 [place] []Yes    [x]No     Latex Allergy:  [x]NO      []YES  Preferred Language for Healthcare:   [x]English       []other:    Functional Scale: FOTO: 49; Risk adjusted: 47; discharge score: 58   Date assessed:2022    Pain level:  1/10 low back     SUBJECTIVE:  Pt reports that he has been working on home exercises; he spends a couple hours per day on exercises. Pt is pleased to note that he is having some success with being able to  his foot and cross his ankle onto the opposite leg when he is slightly reclined.       OBJECTIVE:   Observation:   Test measurements:      RESTRICTIONS/PRECAUTIONS: hx of stroke- affected L side; L side vision limited; increased falls risk    Exercises/Interventions:     Therapeutic Ex (90479)   Min: 25 Sets/sec Reps Notes/CUES   Retro Stepper/BIKE 5'  Resistance 5 - increase as able   Alter G      Bridge Cues for core and glute engagement   LAQ in chair 3 10 4lb   Hip rotations/abductions in chair 2 10 4lb SLR   HEP   Supine hip abd iso   HEP; Black AK   Standing hip abd 2 15 4lb    Standing Hip ext with forearms on wall 2 15  Airex under forearms, 4lb   Seated rows Silver looped band   Supine LTR For back mobility   Leg Press SL 2 15 70#         SC fwd/bwd walks SBA, 1 yellow SC around waist   Glute side walks 3#   Standing hamstring curl 3#   Supine fig 4 hip stretch bilat   Seated EOB heel slides up shin Pt seated EOB leaning back with hands on table    Step ups/ecc step down 2 10 4-6inch   Swissball wall rolls- in SLS- hip drive      Quad hip ext/wall-ball rolls      TB fwd seated ball rolls    TB lat seated ball rolls    Manual Intervention (58816)  Min:      Knee mobs/PROM      Tib/Fem Mobs      Patella Mobs      Ankle mobs                  NMR re-education (07734)  Min: 15   CUES NEEDED   Rhomberg balance - airex Eyes open at half wall; mod difficulty   Tandem stance Level ground @ 1/2 wall   Line walking Lat; fwd; working on weight shift   ST walks Medium ST down line   NBOS on Airex+ball reaches to wall 2 10 Yellow plyoball, SBA   Airex marches At wall, fingertip support   Toe touches on box  8in box   NBOS on Airex with cross-body reaches 2 10 SBA   Biodex Balance machine 6 min  L7, 2 min each: random control, maze control, limits of stability   Therapeutic Activity (83767)  Min: 5      Ladders      KB box tap Green KB   Standing marches  3lb on ankles   Sit to stand 2 10 Chair + 1 airex- challenging with lower seat height   Step up [place]; half wall, 4in box   Mini squats at wall BUE support from half wall       Therapeutic Exercise and NMR EXR  [x] (78123) Provided verbal/tactile cueing for activities related to strengthening, flexibility, endurance, ROM for improvements in LE, proximal hip, and core control with self care, mobility, lifting, ambulation.   [x] (26539) Provided verbal/tactile cueing for activities related to improving balance, coordination, kinesthetic sense, posture, motor skill, proprioception  to assist with LE, proximal hip, and core control in self care, mobility, lifting, ambulation and eccentric single leg control. NMR and Therapeutic Activities:    [x] (11292 or 36778) Provided verbal/tactile cueing for activities related to improving balance, coordination, kinesthetic sense, posture, motor skill, proprioception and motor activation to allow for proper function of core, proximal hip and LE with self care and ADLs and functional mobility.   [] (67943) Gait Re-education- Provided training and instruction to the patient for proper LE, core and proximal hip recruitment and positioning and eccentric body weight control with ambulation re-education including up and down stairs     Home Exercise Program:    [x] (26443) Reviewed/Progressed HEP activities related to strengthening, flexibility, endurance, ROM of core, proximal hip and LE for functional self-care, mobility, lifting and ambulation/stair navigation   [] (38868)Reviewed/Progressed HEP activities related to improving balance, coordination, kinesthetic sense, posture, motor skill, proprioception of core, proximal hip and LE for self care, mobility, lifting, and ambulation/stair navigation      Manual Treatments:  PROM / STM / Oscillations-Mobs:  G-I, II, III, IV (PA's, Inf., Post.)  [] (16480) Provided manual therapy to mobilize LE, proximal hip and/or LS spine soft tissue/joints for the purpose of modulating pain, promoting relaxation,  increasing ROM, reducing/eliminating soft tissue swelling/inflammation/restriction, improving soft tissue extensibility and allowing for proper ROM for normal function with self care, mobility, lifting and ambulation. Modalities:     [] GAME READY (VASO)- for significant edema, swelling, pain control.      Charges:  Timed Code Treatment Minutes: 45   Total Treatment Minutes: 50      [] EVAL (LOW) 17429 (typically 20 minutes face-to-face)  [] EVAL (MOD) 72411 (typically 30 minutes face-to-face)  [] EVAL (HIGH) 58089 (typically 45 minutes face-to-face)  [] RE-EVAL     [x] AR(32297) x  2  [] DRY NEEDLE 1 OR 2 MUSCLES  [x] NMR (05657) x  1   [] DRY NEEDLE 3+ MUSCLES  [] Manual (21656) x       [] TA (58938) x     [] Mech Traction (47439)  [] ES(attended) (79946)     [] ES (un) (14853):   [] VASO (90717)  [] Other:      GOALS  Patient stated goal: \"be able to get in and out of the tractor\"  [] Progressing: [] Met: [x] Not Met: [] Adjusted    Therapist goals for Patient:   Short Term Goals: To be achieved in: 2 weeks  1. Independent in HEP and progression per patient tolerance, in order to prevent re-injury. [] Progressing: [] Met: [x] Not Met: [] Adjusted  2. Patient will have a decrease in pain to facilitate improvement in movement, function, and ADLs as indicated by Functional Deficits. [] Progressing: [] Met: [x] Not Met: [] Adjusted    Long Term Goals: To be achieved in: 8 weeks  1. Disability index score of 10% or less for the LEFS to assist with reaching prior level of function. [] Progressing: [] Met: [x] Not Met: [] Adjusted  2. Patient will demonstrate increased AROM to full LE baljeet to allow for proper joint functioning as indicated by patients Functional Deficits. [] Progressing: [] Met: [x] Not Met: [] Adjusted  3. Patient will demonstrate an increase in Strength to good proximal hip strength and control, within 5lb HHD in LE to allow for proper functional mobility as indicated by patients Functional Deficits. [] Progressing: [] Met: [x] Not Met: [] Adjusted  4. Patient will return to daily functional activities without increased symptoms or restriction. [] Progressing: [] Met: [x] Not Met: [] Adjusted  5. Patient will be able to stand from a standard height chair without increased symptoms or restrictions. [] Progressing: [] Met: [x] Not Met: [] Adjusted  5.  BY DISCHARGE: Pt will be able to get in and out of the tractor at the end of the day without assistance or restrictions.  [] Progressing: [] Met: [x] Not Met: [] Adjusted     ASSESSMENT: Good challenge to balance work today; CGA needed at some points.  Good improvement with sit to stand strength and stamina; lower seat height was challenging today.  Is challenged w/ balance likely increased due to history of co morbidities. Pt does have difficult time w/ anterior weight shift 2/2 neuropathy. Requires cues for eccentric control and preventing hyperextension through knees. Has slight increase in difficulty w/ LLE single leg loading 2/2 history of stroke. Increased time for task completion and rest breaks. Continue to progress functional strength and balance to improve independence and decrease falls risk.            Treatment/Activity Tolerance:  [x] Patient tolerated treatment well [] Patient limited by fatique  [] Patient limited by pain  [] Patient limited by other medical complications  [] Other:     Overall Progression Towards Functional goals/ Treatment Progress Update:  [x] Patient is progressing as expected towards functional goals listed.    [] Progression is slowed due to complexities/Impairments listed.  [] Progression has been slowed due to co-morbidities.  [] Plan just implemented, too soon to assess goals progression <30days   [] Goals require adjustment due to lack of progress  [] Patient is not progressing as expected and requires additional follow up with physician  [] Other    Prognosis for POC: [x] Good [] Fair  [] Poor    Patient requires continued skilled intervention: [x] Yes  [] No        PLAN: 2x/week x8 weeks  [x] Continue per plan of care [] Alter current plan (see comments)  [] Plan of care initiated [] Hold pending MD visit [] Discharge    Electronically signed by: Susy Everett PTA    Note: If patient does not return for scheduled/recommended follow up visits, this note will serve as a discharge from care along with the most recent update on progress.

## 2023-02-02 ENCOUNTER — HOSPITAL ENCOUNTER (OUTPATIENT)
Dept: PHYSICAL THERAPY | Age: 73
Setting detail: THERAPIES SERIES
Discharge: HOME | End: 2023-02-02
Payer: MEDICARE

## 2023-02-02 PROCEDURE — 97110 THERAPEUTIC EXERCISES: CPT

## 2023-02-02 PROCEDURE — 97530 THERAPEUTIC ACTIVITIES: CPT

## 2023-02-02 NOTE — FLOWSHEET NOTE
[place] [address] [facility], [address]  Phone: [phone] Fax: [phone]    Physical Therapy Treatment Note/ Progress Report:       Date:  2023    Patient Name:  Kate Bhat    :  1950  MRN: [id number]  Restrictions/Precautions:    Medical/Treatment Diagnosis Information:  Diagnosis: M62.81 muscle weakness, generalized  Treatment Diagnosis: Y93.94  Insurance/Certification information:  PT Insurance Information: Medicare; [place]; ded met; [place] visits  Physician Information:  Tai  [facility] signed (Y/N):     Date of Patient follow up with Physician:      Progress Report: []  Yes  [x]  No     Date Range for reporting period:  Beginnin2022  Ending:     Progress report due (10 Rx/or 30 days whichever is less):      Recertification due (POC duration/ or 90 days whichever is less): 2023     Visit # Insurance Allowable Auth Needed   14 [place] []Yes    [x]No     Latex Allergy:  [x]NO      []YES  Preferred Language for Healthcare:   [x]English       []other:    Functional Scale: FOTO: 49; Risk adjusted: 47; discharge score: 58   Date assessed:2022    Pain level:  1/10 low back     SUBJECTIVE:  Pt states that his balance is not good today; \"I'm not sure why. \"  Pt reports compliance c HEP and will be going to the heart doctor today to get a monitor. OBJECTIVE: Pt self-administered his inhaler during exercises.    Observation:   Test measurements:      RESTRICTIONS/PRECAUTIONS: hx of stroke- affected L side; L side vision limited; increased falls risk    Exercises/Interventions:     Therapeutic Ex (14425)   Min: 26 Sets/sec Reps Notes/CUES   Retro Stepper/BIKE 5'  Resistance 5 - increase as able   Alter G      Bridge Cues for core and glute engagement   LAQ in chair 2 15 4lb   Hip rotations/abductions in chair 2 15 4lb   SLR   HEP   Supine hip abd iso   HEP; Black AK   Standing hip abd 2 15 4lb    Standing Hip ext with forearms on wall 2 15  Airex under forearms, 4lb   Standing hamstring curls 4lb B   Seated rows Silver looped band   Supine LTR For back mobility   Leg Press DL 2 15 95#         SC fwd/bwd walks SBA, 1 yellow SC around waist   Glute side walks 3#       Supine fig 4 hip stretch bilat   Seated EOB heel slides up shin Pt seated EOB leaning back with hands on table    Step ups/ecc step down 2 10 6inch, 2 sets per leg   Swissball wall rolls- in SLS- hip drive      Quad hip ext/wall-ball rolls      TB fwd seated ball rolls    TB lat seated ball rolls    Manual Intervention (87148)  Min:      Knee mobs/PROM      Tib/Fem Mobs      Patella Mobs      Ankle mobs                  NMR re-education (50462)  Min:    CUES NEEDED   Rhomberg balance - airex Eyes open at half wall; mod difficulty   Tandem stance Level ground @ 1/2 wall   Line walking Lat; fwd; working on weight shift   ST walks Medium ST down line   NBOS on Airex+ball reaches to wall 2 10 Yellow plyoball, SBA   Airex marches At wall, fingertip support   Toe touches on box  8in box   NBOS on Airex with cross-body reaches 2 10 SBA   Biodex Balance machine  L7, 2 min each: random control, maze control, limits of stability   Therapeutic Activity (86016)  Min: 14      Ladders      KB box tap Green KB   Standing marches  2 10 4lb on ankles   Sit to stand 2 10 Chair + 1 airex- challenging with lower seat height   Step up [place]; half wall, 6in box   Mini squats at wall BUE support from half wall       Therapeutic Exercise and NMR EXR  [x] (94880) Provided verbal/tactile cueing for activities related to strengthening, flexibility, endurance, ROM for improvements in LE, proximal hip, and core control with self care, mobility, lifting, ambulation.   [x] (39427) Provided verbal/tactile cueing for activities related to improving balance, coordination, kinesthetic sense, posture, motor skill, proprioception  to assist with LE, proximal hip, and core control in self care, mobility, lifting, ambulation and eccentric single leg control. NMR and Therapeutic Activities:    [x] (71665 or 55752) Provided verbal/tactile cueing for activities related to improving balance, coordination, kinesthetic sense, posture, motor skill, proprioception and motor activation to allow for proper function of core, proximal hip and LE with self care and ADLs and functional mobility.   [] (09110) Gait Re-education- Provided training and instruction to the patient for proper LE, core and proximal hip recruitment and positioning and eccentric body weight control with ambulation re-education including up and down stairs     Home Exercise Program:    [x] (04730) Reviewed/Progressed HEP activities related to strengthening, flexibility, endurance, ROM of core, proximal hip and LE for functional self-care, mobility, lifting and ambulation/stair navigation   [] (47636)Reviewed/Progressed HEP activities related to improving balance, coordination, kinesthetic sense, posture, motor skill, proprioception of core, proximal hip and LE for self care, mobility, lifting, and ambulation/stair navigation      Manual Treatments:  PROM / STM / Oscillations-Mobs:  G-I, II, III, IV (PA's, Inf., Post.)  [] (47453) Provided manual therapy to mobilize LE, proximal hip and/or LS spine soft tissue/joints for the purpose of modulating pain, promoting relaxation,  increasing ROM, reducing/eliminating soft tissue swelling/inflammation/restriction, improving soft tissue extensibility and allowing for proper ROM for normal function with self care, mobility, lifting and ambulation. Modalities:     [] GAME READY (VASO)- for significant edema, swelling, pain control.      Charges:  Timed Code Treatment Minutes: 40   Total Treatment Minutes: 46      [] EVAL (LOW) 67731 (typically 20 minutes face-to-face)  [] EVAL (MOD) 82795 (typically 30 minutes face-to-face)  [] EVAL (HIGH) 94558 (typically 45 minutes face-to-face)  [] RE-EVAL     [x] DV(30954) x  2  [] DRY NEEDLE 1 OR 2 MUSCLES  [] NMR (19650) x     [] DRY NEEDLE 3+ MUSCLES  [] Manual (60487) x       [x] TA (54514) x  1    [] Mech Traction (92584)  [] ES(attended) (82783)     [] ES (un) (86893):   [] VASO (69581)  [] Other:      GOALS  Patient stated goal: \"be able to get in and out of the tractor\"  [] Progressing: [] Met: [x] Not Met: [] Adjusted    Therapist goals for Patient:   Short Term Goals: To be achieved in: 2 weeks  1. Independent in HEP and progression per patient tolerance, in order to prevent re-injury. [] Progressing: [] Met: [x] Not Met: [] Adjusted  2. Patient will have a decrease in pain to facilitate improvement in movement, function, and ADLs as indicated by Functional Deficits. [] Progressing: [] Met: [x] Not Met: [] Adjusted    Long Term Goals: To be achieved in: 8 weeks  1. Disability index score of 10% or less for the LEFS to assist with reaching prior level of function. [] Progressing: [] Met: [x] Not Met: [] Adjusted  2. Patient will demonstrate increased AROM to full LE baljeet to allow for proper joint functioning as indicated by patients Functional Deficits. [] Progressing: [] Met: [x] Not Met: [] Adjusted  3. Patient will demonstrate an increase in Strength to good proximal hip strength and control, within 5lb HHD in LE to allow for proper functional mobility as indicated by patients Functional Deficits. [] Progressing: [] Met: [x] Not Met: [] Adjusted  4. Patient will return to daily functional activities without increased symptoms or restriction. [] Progressing: [] Met: [x] Not Met: [] Adjusted  5. Patient will be able to stand from a standard height chair without increased symptoms or restrictions. [] Progressing: [] Met: [x] Not Met: [] Adjusted  5. BY DISCHARGE: Pt will be able to get in and out of the tractor at the end of the day without assistance or restrictions.   [] Progressing: [] Met: [x] Not Met: [] Adjusted     ASSESSMENT:  Good challenge to balance work today; CGA needed at some points. Good improvement with sit to stand strength and stamina; lower seat height was challenging today. Is challenged w/ balance likely increased due to history of co morbidities. Pt does have difficult time w/ anterior weight shift 2/2 neuropathy. Requires cues for eccentric control and preventing hyperextension through knees. Has slight increase in difficulty w/ LLE single leg loading 2/2 history of stroke. Increased time for task completion and rest breaks. Continue to progress functional strength and balance to improve independence and decrease falls risk. Treatment/Activity Tolerance:  [x] Patient tolerated treatment well [] Patient limited by fatique  [] Patient limited by pain  [] Patient limited by other medical complications  [] Other:     Overall Progression Towards Functional goals/ Treatment Progress Update:  [x] Patient is progressing as expected towards functional goals listed. [] Progression is slowed due to complexities/Impairments listed. [] Progression has been slowed due to co-morbidities. [] Plan just implemented, too soon to assess goals progression <30days   [] Goals require adjustment due to lack of progress  [] Patient is not progressing as expected and requires additional follow up with physician  [] Other    Prognosis for POC: [x] Good [] Fair  [] Poor    Patient requires continued skilled intervention: [x] Yes  [] No        PLAN: 2x/week x8 weeks  [x] Continue per plan of care [] Alter current plan (see comments)  [] Plan of care initiated [] Hold pending MD visit [] Discharge    Electronically signed by: Bao Sotelo PTA    Note: If patient does not return for scheduled/recommended follow up visits, this note will serve as a discharge from care along with the most recent update on progress.

## 2023-02-03 NOTE — PROGRESS NOTES
[address] [facility]  [phone]  2/9/23  Referring: Dr Neftali Douglass CONSULT/CHIEF COMPLAINT/HPI     Reason for visit/ Chief complaint     Chief Complaint   Patient presents with    Tachycardia    Dizziness    Established New Doctor       HPI Sherren Lopes is a 81 y.o. new patient here by referral from Dr Karley Bro for Tachycardia, palpitations, dizziness, lightheadedness. His previous cardiologist was Dr Mark Venegas at [facility]. He has never smoked, does not drink alcohol and no drug use history reported. Today he is here with his wife. He recently wore a Holter monitor but we have not received the results as of yet. He has Afib for several years. He cannot tell when he is in Afib. He has not been seeing cardiologist.    He had diabetes, hypertension, a history of stroke, and is over 72years of age, thus [address], [address] score = 5. He is appropriately on a NOAC and hasn't had any major issues of bleeding, although he does bruise easily. His stroke was 13 years ago. He has been on a regimen for rhythm-control of AF including propafenone, diltiazem, and carvedilol for quite some time and has been stable. He had a negative Lexiscan in 2018 and has no known coronary artery disease. He states that he recently got lightheaded and checked his pulse and it was 85 bpm, next day it was 120bpm, typically it is in 50's. He denies CP or tightness and palpitations. The episodes are typically increased HR, SOB, dizziness. He denies snoring and wife does as well. Patient is adherent with medications and is tolerating them well without side effects     HISTORY/ALLERGIES/ROS     MedHx:  has a past medical history of COPD (chronic obstructive pulmonary disease) ([facility] [address] Diabetes mellitus ([facility] [address] and Edema. SurgHx:  has no past surgical history on file. SocHx:  reports that he has never smoked.  He has never used smokeless tobacco. He reports that he does not currently use alcohol. FamHx: No family history of premature coronary artery disease, sudden death, or aneurysm  Allergies: Patient has no known allergies. MEDICATIONS      Prior to Admission medications    Medication Sig Start Date End Date Taking?  Authorizing Provider   pioglitazone (ACTOS) 45 MG tablet TAKE 1 TABLET BY MOUTH EVERY DAY 1/6/23  Yes Historical Provider, MD   ELIQUIS 5 MG TABS tablet TAKE 1 TABLET BY MOUTH TWICE A DAY 11/22/22  Yes Historical Provider, MD   furosemide (LASIX) 40 MG tablet TAKE 1 TABLET BY MOUTH TWICE A DAY 11/22/22  Yes Historical Provider, MD   carvedilol (COREG) 3.125 MG tablet TAKE 1 TABLET BY MOUTH TWICE A DAY 12/30/22  Yes Historical Provider, MD   propafenone (RYTHMOL) 150 MG tablet TAKE 1 TABLET BY MOUTH TWICE A DAY 11/22/22  Yes Historical Provider, MD   dilTIAZem (TIAZAC) 300 MG extended release capsule Take 300 mg by mouth daily   Yes Historical Provider, MD   ASPIRIN LOW DOSE 81 MG EC tablet TAKE 1 TABLET BY MOUTH EVERY DAY 12/27/22  Yes Historical Provider, MD   pravastatin (PRAVACHOL) 40 MG tablet TAKE 1 TABLET BY MOUTH EVERY DAY AT NIGHT 1/23/23  Yes Historical Provider, MD   amLODIPine (NORVASC) 5 MG tablet TAKE 1 TABLET BY MOUTH EVERY DAY 1/23/23  Yes Historical Provider, MD   tamsulosin (FLOMAX) 0.4 MG capsule TAKE 1 CAPSULE BY MOUTH EVERY DAY 12/27/22  Yes Historical Provider, MD   metFORMIN (GLUCOPHAGE) 500 MG tablet TAKE 1 TABLET BY MOUTH EVERY DAY 12/14/22  Yes Historical Provider, MD   FOLINIC-PLUS 4-50-2 MG TABS TAKE 1 TABLET BY MOUTH EVERY DAY 12/27/22  Yes Historical Provider, MD   brimonidine (ALPHAGAN) 0.2 % ophthalmic solution INSTILL 1 DROP INTO LEFT EYE TWICE A DAY 2/5/23  Yes Historical Provider, MD   ipratropium-albuterol (DUONEB) 0.5-2.5 (3) MG/3ML SOLN nebulizer solution USE 1 VIAL VIA NEBULIZER EVERY 6 HOURS 11/17/22  Yes Historical Provider, MD   latanoprost (XALATAN) 0.005 % ophthalmic solution INSTILL 1 DROP INTO LEFT EYE AT BEDTIME 2/5/23  Yes Historical Provider, MD   fluticasone [place]) 50 MCG/ACT nasal spray 2 sprays by Nasal route daily 2/5/22  Yes Historical Provider, MD       PHYSICAL EXAM        Vitals:    02/09/23 1115   BP: 106/64   Pulse: 62   SpO2: 98%    Weight: 258 lb 6.4 oz (117.2 kg)     Gen Alert, cooperative, no distress Heart  Regular rate and rhythm, no murmur   Head Normocephalic, atraumatic, no abnormalities Abd  Soft, NT, +BS, no mass, no OM   Eyes PERRLA, conj/corn clear Ext  Ext nl, AT, no C/C, no edema   Nose Nares normal, no drain age, Non-tender Pulse 2+ and symmetric   Throat Lips, mucosa, tongue normal Skin Color/text/turg nl, no rash/lesions   Neck S/S, TM, NT, no bruit Psych Nl mood and affect   Lung CTA-B, unlabored, no DTP     Ch wall NT, no deform       LABS and Imaging     Relevant and available CV data reviewed    Labs are 3 yrs old or > in [facility]     EKG personally interpreted: 2/9/23   Undetermined   Rhythm   Low voltage in limb leads.    -Old inferior infarct  -Old anterior infarct. We ordered a new echo today, which I personally reviewed  LVEF Is normal  No significant valve disease  LA size is normal  Probably grade I diastolic dysfunction    Holter 2/7/23 (PCP )     NM Myoview 9/20/2018 [facility]   Impression  abnormal   1. Normal study without definitive areas of myocardial infarction or ischemia. 2. The overall left ventricular systolic function was visually normal without wall motion abnormalities. 3. Post stress EF is 43%, please correlate with echo. .   4. No prior study is available for comparison     Echo 9/17/2018 [facility]   SUMMARY:   1. Left ventricle: The cavity size was normal. Wall thickness was      normal. Systolic function was normal. The estimated ejection      fraction was in the range of 55% to 60%. Wall motion was normal;      there were no regional wall motion abnormalities. Unable to      assess diastolic function. 2. Mitral valve: There was trivial regurgitation.    3. Right ventricle: The cavity size was normal. Wall thickness was      normal. Systolic function was normal.   4. Tricuspid valve: There was trivial regurgitation. 5. Pulmonary arteries: Estimated PA peak pressure is 15mm Hg (S). CT chest 7/6/2018 [facility]   The heart is normal in size. There is no pericardial effusion. No significant hilar, mediastinal or axillary adenopathy is seen. Echo results [facility] 7/22/11  See in [address]  ModerateComplexity/Medical Decision Making    Outside/Care everywhere records Reviewed  Labs Reviewed  Prior Imaging, ekg, cath, echo reviewed when available  Medications reviewed  Old Notes reviewed  ASSESSMENT AND PLAN     1. Palpitations/Tachy  Holter - have not received results yet   Plan:     Echo done today which shows a structurally normal heart with normal LVEF and normal LA size; he is in sinus rhythm today, is tolerating propafenone, and is likely to stay in sinus rhythm on his current regimen. Given that he is on a Ic agent I cautioned him about the risk of sudden cardiac death if he were to develop CAD or have an MI. If he has an abnormal stress test in the future, we would need to stop his propafenone. I called my partner, Dr. Alysha Cook, from EP to discuss if we should be doing routine stress tests yearly, and he did not think that was necessary. Will consider repeating a routine stress test in a few years. Will await results of his Holter from [facility], otherwise will see him back in a year, sooner if new issues. 2.SOB  Holter   Plan: Echo today to evaluate further. Nothing on echo looks very worrisome. He may have some mild diastolic dysfunction, although that is hard to assess when the LA is normal in size.     3.Dizzy/Lightheaded  Holter  Plan: Echo today to evaluate further     4.E HTN   /64 (Site: Right Upper Arm, Position: Sitting, Cuff Size: Medium Adult)   Pulse 62   Ht 6' (1.829 m)   Wt 258 lb 6.4 oz (117.2 kg)   SpO2 98%   BMI 35.05 kg/m²   Amlodipine 5mg daily   Plan: Continue Amlodipine     5. HLD   Pravastatin 40mg daily   Plan: Continue Pravastatin     6. CHF   Diastolic   Lasix 35LC daily   Coreg 3.125 mg daily   Plan: Continue Lasix, Coreg     4. Persistent Afib   Eliquis 5mg BID   Asa 81 mg daily   Rythmol 150mg every 8 hrs - his PCP prescribed this and he has not had any issues   Cardizem 300mg daily   Plan: no bruising or bleeding issues on the eliquis. 5. Hx of CVA - hemorrhagic   2010  Reportedly related to elevated blood pressure at the time     6. DMII   Metformin 500mg daily   Plan: Continue Metformin     Patient counseled on lifestyle modification, diet, and exercise. Follow Up: 1 year     Jade Finch MD  Cardiologist [facility]    Scribe Attestation: This note was scribed in the presence of Dr. Yovani Redding by Kilo Bhatia RN. Physician Attestation  The scribe wrote this note in the presence of me Jade Finch MD). The scribe may have prepopulated components of this note with my historical  intellectual property under my direct supervision. Any additions to this intellectual property were performed in my presence and at my direction. Furthermore, the content and accuracy of this note have been reviewed by me with edits by me as needed.   Jade Finch MD 2/9/2023 1:17 PM

## 2023-02-08 PROBLEM — R42 DIZZY: Status: ACTIVE | Noted: 2023-02-08

## 2023-02-08 PROBLEM — R06.02 SOB (SHORTNESS OF BREATH): Status: ACTIVE | Noted: 2023-02-08

## 2023-02-08 PROBLEM — R00.2 PALPITATIONS: Status: ACTIVE | Noted: 2023-02-08

## 2023-02-09 ENCOUNTER — PROCEDURE VISIT (OUTPATIENT)
Dept: CARDIOLOGY CLINIC | Age: 73
End: 2023-02-09

## 2023-02-09 ENCOUNTER — TELEPHONE (OUTPATIENT)
Dept: CARDIOLOGY CLINIC | Age: 73
End: 2023-02-09

## 2023-02-09 ENCOUNTER — HOSPITAL ENCOUNTER (OUTPATIENT)
Dept: PHYSICAL THERAPY | Age: 73
Setting detail: THERAPIES SERIES
Discharge: HOME | End: 2023-02-09
Payer: MEDICARE

## 2023-02-09 ENCOUNTER — OFFICE VISIT (OUTPATIENT)
Dept: CARDIOLOGY CLINIC | Age: 73
End: 2023-02-09

## 2023-02-09 VITALS
HEART RATE: 62 BPM | BODY MASS INDEX: 35 KG/M2 | WEIGHT: 258.4 LBS | DIASTOLIC BLOOD PRESSURE: 64 MMHG | SYSTOLIC BLOOD PRESSURE: 106 MMHG | HEIGHT: 72 IN | OXYGEN SATURATION: 98 %

## 2023-02-09 DIAGNOSIS — I50.30 DIASTOLIC CONGESTIVE HEART FAILURE, UNSPECIFIED HF CHRONICITY (HCC): ICD-10-CM

## 2023-02-09 DIAGNOSIS — R00.2 PALPITATIONS: ICD-10-CM

## 2023-02-09 DIAGNOSIS — Z86.73 H/O: CVA (CEREBROVASCULAR ACCIDENT): ICD-10-CM

## 2023-02-09 DIAGNOSIS — I48.19 PERSISTENT ATRIAL FIBRILLATION (HCC): ICD-10-CM

## 2023-02-09 DIAGNOSIS — E78.5 HYPERLIPIDEMIA, UNSPECIFIED HYPERLIPIDEMIA TYPE: ICD-10-CM

## 2023-02-09 DIAGNOSIS — R00.2 PALPITATIONS: Primary | ICD-10-CM

## 2023-02-09 DIAGNOSIS — I10 ESSENTIAL HYPERTENSION: ICD-10-CM

## 2023-02-09 DIAGNOSIS — R06.02 SOB (SHORTNESS OF BREATH): ICD-10-CM

## 2023-02-09 DIAGNOSIS — R42 DIZZY: ICD-10-CM

## 2023-02-09 LAB
LV EF: 60 %
LVEF MODALITY: NORMAL

## 2023-02-09 PROCEDURE — 97110 THERAPEUTIC EXERCISES: CPT

## 2023-02-09 PROCEDURE — 97112 NEUROMUSCULAR REEDUCATION: CPT

## 2023-02-09 RX ORDER — PRAVASTATIN SODIUM 40 MG
TABLET ORAL
COMMUNITY
Start: 2023-01-23

## 2023-02-09 RX ORDER — LATANOPROST 50 UG/ML
SOLUTION/ DROPS OPHTHALMIC
COMMUNITY
Start: 2023-02-05

## 2023-02-09 RX ORDER — APIXABAN 5 MG/1
TABLET, FILM COATED ORAL
COMMUNITY
Start: 2022-11-22

## 2023-02-09 RX ORDER — FUROSEMIDE 40 MG/1
TABLET ORAL
COMMUNITY
Start: 2022-11-22

## 2023-02-09 RX ORDER — IPRATROPIUM BROMIDE AND ALBUTEROL SULFATE 2.5; .5 MG/3ML; MG/3ML
SOLUTION RESPIRATORY (INHALATION)
COMMUNITY
Start: 2022-11-17

## 2023-02-09 RX ORDER — PIOGLITAZONEHYDROCHLORIDE 45 MG/1
TABLET ORAL
COMMUNITY
Start: 2023-01-06

## 2023-02-09 RX ORDER — BRIMONIDINE TARTRATE 2 MG/ML
SOLUTION/ DROPS OPHTHALMIC
COMMUNITY
Start: 2023-02-05

## 2023-02-09 RX ORDER — FLUTICASONE PROPIONATE 50 MCG
2 SPRAY, SUSPENSION (ML) NASAL DAILY
COMMUNITY
Start: 2022-02-05

## 2023-02-09 RX ORDER — TAMSULOSIN HYDROCHLORIDE 0.4 MG/1
CAPSULE ORAL
COMMUNITY
Start: 2022-12-27

## 2023-02-09 RX ORDER — DILTIAZEM HYDROCHLORIDE 300 MG/1
300 CAPSULE, EXTENDED RELEASE ORAL DAILY
COMMUNITY

## 2023-02-09 RX ORDER — ASPIRIN 81 MG/1
TABLET, COATED ORAL
COMMUNITY
Start: 2022-12-27

## 2023-02-09 RX ORDER — PROPAFENONE HYDROCHLORIDE 150 MG/1
TABLET, FILM COATED ORAL
COMMUNITY
Start: 2022-11-22

## 2023-02-09 RX ORDER — LEUCOVORIN/PYRIDOX/MECOBALAMIN 4-50-2 MG
TABLET ORAL
COMMUNITY
Start: 2022-12-27

## 2023-02-09 RX ORDER — AMLODIPINE BESYLATE 5 MG/1
TABLET ORAL
COMMUNITY
Start: 2023-01-23

## 2023-02-09 RX ORDER — CARVEDILOL 3.12 MG/1
TABLET ORAL
COMMUNITY
Start: 2022-12-30

## 2023-02-09 NOTE — TELEPHONE ENCOUNTER
----- Message from Kye Hi MD sent at 2/9/2023  3:29 PM EST -----  Echo looks good  I discussed meds with Dr Seymour Alejandre - ok to stay on propafenone at current dose    No need for another visit until 12 months from now unless something changes

## 2023-02-09 NOTE — PLAN OF CARE
[address]  Phone: [phone] Fax: [phone]        Physical Therapy Re-Certification Plan of [facility]      Dear  Dr. Gina Young,    We had the pleasure of treating the following patient for physical therapy services at [address] [place] [address]. A summary of our findings can be found in the updated assessment below. This includes our plan of care. If you have any questions or concerns regarding these findings, please do not hesitate to contact me at the office phone number checked above.   Thank you for the referral.     Physician Signature:________________________________Date:__________________  By signing above (or electronic signature), therapists plan is approved by physician    Date Range Of Visits: 2022-  Total Visits to Date: 13  Overall Response to Treatment:   []Patient is responding well to treatment and improvement is noted with regards  to goals   []Patient should continue to improve in reasonable time if they continue HEP   []Patient has plateaued and is no longer responding to skilled PT intervention    []Patient is getting worse and would benefit from return to referring MD   []Patient unable to adhere to initial POC   [x]Other: pt has made slow but steady improvement in LE functional strength and overall cardiovascular endurance, however I do believe progress is limited 2/2 medical history and co-morbidities; pt is compliant w/ HEP and is increasing load slightly each week          Physical Therapy Treatment Note/ Progress Report:       Date:  2023    Patient Name:  Angelito Ly    :  1950  MRN: [id number]  Restrictions/Precautions:    Medical/Treatment Diagnosis Information:  Diagnosis: M62.81 muscle weakness, generalized  Treatment Diagnosis: B64.62  Insurance/Certification information:  PT Insurance Information: Medicare; [place]; ded met; [place] visits  Physician Information:  Tai, [address] [facility] signed (Y/N):     Date of Patient follow up with Physician:      Progress Report: [x]  Yes  []  No     Date Range for reporting period:  Beginnin2023  Endin days or 25 visits    Progress report due (10 Rx/or 30 days whichever is less): 8134    Recertification due (POC duration/ or 90 days whichever is less): 3/11/2023    Visit # Insurance Allowable Auth Needed   15 [place] []Yes    [x]No     Latex Allergy:  [x]NO      []YES  Preferred Language for Healthcare:   [x]English       []other:    Functional Scale: LEFS:   Date assessed: 2023    Pain level:  1/10 low back     SUBJECTIVE:  Pt states that he feels like he is tolerating a little more at home and is able to lift a little more. States he has noticed his balance seems to be better when he walks the halls based on not having to use his hands as much for balance assistance. Has his follow up w/ cardiologist after PT today. OBJECTIVE: Pt self-administered his inhaler during exercises.    Observation:   Test measurements:      2023  ROM LEFT RIGHT   Lumbar flex FT to ankles; *seated   Lumbar ext 5 deg *seated   Lumbar rotation 70% 75%           HIP Flex 109        RESTRICTIONS/PRECAUTIONS: hx of stroke- affected L side; L side vision limited; increased falls risk    Exercises/Interventions:     Therapeutic Ex (47461)   Min: 26 Sets/sec Reps Notes/CUES   Retro Stepper/BIKE 5'  Resistance 5 - increase as able   Alter G      Bridge Cues for core and glute engagement   LAQ in chair 2 15 4lb   Hip rotations/abductions in chair 2 15 4lb   SLR   HEP   Supine hip abd iso   HEP; Black AK   Standing hip abd 2 15 4lb    Standing Hip ext with forearms on wall 2 15  Airex under forearms, 4lb   Standing hamstring curls 4lb B   Seated rows Silver looped band   Supine LTR For back mobility   Leg Press DL 2 15 100#         SC fwd/bwd walks SBA, 1 yellow SC around waist   Glute side walks 3#       Supine fig 4 hip stretch bilat   Seated EOB heel slides up shin Pt seated EOB leaning back with hands on table    Step ups/ecc step down 2 10 6inch, 2 sets per leg   Swissball wall rolls- in SLS- hip drive      Quad hip ext/wall-ball rolls      TB fwd seated ball rolls    TB lat seated ball rolls    Manual Intervention (63668)  Min:      Knee mobs/PROM      Tib/Fem Mobs      Patella Mobs      Ankle mobs                  NMR re-education (27212)  Min: 15   CUES NEEDED   Rhomberg balance - airex Eyes open at half wall; mod difficulty   Tandem stance Level ground @ 1/2 wall   Line walking Lat; fwd; working on weight shift   ST walks Medium ST down line   NBOS on Airex+ball reaches to wall 2 10 Yellow plyoball, SBA   Airex marches At wall, fingertip support   Toe touches on box  8in box   Step pattern react 1 10    Stepping pattern  1 10 3 way   NBOS on Airex with cross-body reaches 2 10 SBA   [email or web address] Balance machine  L7, 2 min each: random control, maze control, limits of stability   Therapeutic Activity (56155)  Min: 14      Ladders      KB box tap Green KB   Standing marches  2 10 4lb on ankles   Sit to stand 2 10 Chair + 1 airex- challenging with lower seat height   Step up [place]; half wall, 6in box   Mini squats at wall BUE support from half wall       Therapeutic Exercise and NMR EXR  [x] (78260) Provided verbal/tactile cueing for activities related to strengthening, flexibility, endurance, ROM for improvements in LE, proximal hip, and core control with self care, mobility, lifting, ambulation. [x] (65773) Provided verbal/tactile cueing for activities related to improving balance, coordination, kinesthetic sense, posture, motor skill, proprioception  to assist with LE, proximal hip, and core control in self care, mobility, lifting, ambulation and eccentric single leg control.      NMR and Therapeutic Activities:    [x] (29450 or 16850) Provided verbal/tactile cueing for activities related to improving balance, coordination, kinesthetic sense, posture, motor skill, proprioception and motor activation to allow for proper function of core, proximal hip and LE with self care and ADLs and functional mobility.   [] (72696) Gait Re-education- Provided training and instruction to the patient for proper LE, core and proximal hip recruitment and positioning and eccentric body weight control with ambulation re-education including up and down stairs     Home Exercise Program:    [x] (31038) Reviewed/Progressed HEP activities related to strengthening, flexibility, endurance, ROM of core, proximal hip and LE for functional self-care, mobility, lifting and ambulation/stair navigation   [] (24858)Reviewed/Progressed HEP activities related to improving balance, coordination, kinesthetic sense, posture, motor skill, proprioception of core, proximal hip and LE for self care, mobility, lifting, and ambulation/stair navigation      Manual Treatments:  PROM / STM / Oscillations-Mobs:  G-I, II, III, IV (PA's, Inf., Post.)  [] (15454) Provided manual therapy to mobilize LE, proximal hip and/or LS spine soft tissue/joints for the purpose of modulating pain, promoting relaxation,  increasing ROM, reducing/eliminating soft tissue swelling/inflammation/restriction, improving soft tissue extensibility and allowing for proper ROM for normal function with self care, mobility, lifting and ambulation. Modalities:     [] GAME READY (VASO)- for significant edema, swelling, pain control.      Charges:  Timed Code Treatment Minutes: 40   Total Treatment Minutes: 46      [] EVAL (LOW) 56225 (typically 20 minutes face-to-face)  [] EVAL (MOD) 09758 (typically 30 minutes face-to-face)  [] EVAL (HIGH) 00242 (typically 45 minutes face-to-face)  [] RE-EVAL     [x] HK(35135) x  2  [] DRY NEEDLE 1 OR 2 MUSCLES  [x] NMR (80483) x 1    [] DRY NEEDLE 3+ MUSCLES  [] Manual (34959) x       [] TA (05406) x  1    [] Mech Traction (71941)  [] ES(attended) (67192)     [] ES (un) (94344):   [] VASO (74316)  [] Other:      GOALS  Patient stated goal: \"be able to get in and out of the tractor\"  [] Progressing: [] Met: [x] Not Met: [] Adjusted    Therapist goals for Patient:   Short Term Goals: To be achieved in: 2 weeks  1. Independent in HEP and progression per patient tolerance, in order to prevent re-injury. [] Progressing: [] Met: [x] Not Met: [] Adjusted  2. Patient will have a decrease in pain to facilitate improvement in movement, function, and ADLs as indicated by Functional Deficits. [] Progressing: [] Met: [x] Not Met: [] Adjusted    Long Term Goals: To be achieved in: 8 weeks  1. Disability index score of 10% or less for the LEFS to assist with reaching prior level of function. [] Progressing: [] Met: [x] Not Met: [] Adjusted  2. Patient will demonstrate increased AROM to full LE baljeet to allow for proper joint functioning as indicated by patients Functional Deficits. [] Progressing: [] Met: [x] Not Met: [] Adjusted  3. Patient will demonstrate an increase in Strength to good proximal hip strength and control, within 5lb HHD in LE to allow for proper functional mobility as indicated by patients Functional Deficits. [] Progressing: [] Met: [x] Not Met: [] Adjusted  4. Patient will return to daily functional activities without increased symptoms or restriction. [] Progressing: [] Met: [x] Not Met: [] Adjusted  5. Patient will be able to stand from a standard height chair without increased symptoms or restrictions. [] Progressing: [] Met: [x] Not Met: [] Adjusted  5. BY DISCHARGE: Pt will be able to get in and out of the tractor at the end of the day without assistance or restrictions. [] Progressing: [] Met: [x] Not Met: [] Adjusted     ASSESSMENT:  Good challenge to balance work today; CGA needed at some points. Good improvement with sit to stand strength and stamina; lower seat height was challenging today.   Is challenged w/ balance likely increased due to history of co morbidities. Pt does have difficult time w/ anterior weight shift 2/2 neuropathy. Requires cues for eccentric control and preventing hyperextension through knees. Has slight increase in difficulty w/ LLE single leg loading 2/2 history of stroke. Increased time for task completion and rest breaks. Continue to progress functional strength and balance to improve independence and decrease falls risk. Treatment/Activity Tolerance:  [x] Patient tolerated treatment well [] Patient limited by fatique  [] Patient limited by pain  [] Patient limited by other medical complications  [] Other:     Overall Progression Towards Functional goals/ Treatment Progress Update:  [x] Patient is progressing as expected towards functional goals listed. [] Progression is slowed due to complexities/Impairments listed. [] Progression has been slowed due to co-morbidities. [] Plan just implemented, too soon to assess goals progression <30days   [] Goals require adjustment due to lack of progress  [] Patient is not progressing as expected and requires additional follow up with physician  [] Other    Prognosis for POC: [x] Good [] Fair  [] Poor    Patient requires continued skilled intervention: [x] Yes  [] No        PLAN: 1-2x/week x8 weeks  [x] Continue per plan of care [] Alter current plan (see comments)  [] Plan of care initiated [] Hold pending MD visit [] Discharge    Electronically signed by: Wendie Garza PT    Note: If patient does not return for scheduled/recommended follow up visits, this note will serve as a discharge from care along with the most recent update on progress.

## 2023-02-09 NOTE — PATIENT INSTRUCTIONS
Follow up with Dr Chiquita Walker in 1 year   Dr Chiquita Walker will call you with results of Echo once he reviews. Call for any questions or concerns.
30-Mar-2018

## 2023-02-14 ENCOUNTER — HOSPITAL ENCOUNTER (OUTPATIENT)
Dept: PHYSICAL THERAPY | Age: 73
Setting detail: THERAPIES SERIES
Discharge: HOME | End: 2023-02-14
Payer: MEDICARE

## 2023-02-14 PROCEDURE — 97110 THERAPEUTIC EXERCISES: CPT

## 2023-02-14 PROCEDURE — 97530 THERAPEUTIC ACTIVITIES: CPT

## 2023-02-14 NOTE — FLOWSHEET NOTE
[place] [address] [facility]  Phone: [phone] Fax: [phone]              Physical Therapy Treatment Note/ Progress Report:       Date:  2023    Patient Name:  Herminia Voss    :  1950  MRN: [id number]  Restrictions/Precautions:    Medical/Treatment Diagnosis Information:  Diagnosis: M62.81 muscle weakness, generalized  Treatment Diagnosis: G99.81  Insurance/Certification information:  PT Insurance Information: Medicare; [place] [employer] [place]; ded met; [place] visits  Physician Information:  Tai [facility] signed (Y/N):     Date of Patient follow up with Physician:      Progress Report: []  Yes  [x]  No     Date Range for reporting period:  Beginnin2023  Endin days or 25 visits    Progress report due (10 Rx/or 30 days whichever is less):     Recertification due (POC duration/ or 90 days whichever is less): 3/11/2023    Visit # Insurance Allowable Auth Needed   16 [place] []Yes    [x]No     Latex Allergy:  [x]NO      []YES  Preferred Language for Healthcare:   [x]English       []other:    Functional Scale: LEFS:   Date assessed: 2023    Pain level:  10 low back     SUBJECTIVE:  Pt reports compliance c HEP and notices that he doesn't have to balance himself at the wall as much when doing the side stepping down the hallway. Pt tries to walk out to the barn more frequently and is doing well with walking on gravel. Pt does continue to get fatigued through the day and has difficulty climbing up onto bigger machinery and trucks. OBJECTIVE: Pt self-administered his inhaler during exercises.    Observation:   Test measurements:      2023  ROM LEFT RIGHT   Lumbar flex FT to ankles; *seated   Lumbar ext 5 deg *seated   Lumbar rotation 70% 75%           HIP Flex 109        RESTRICTIONS/PRECAUTIONS: hx of stroke- affected L side; L side vision limited; increased falls risk    Exercises/Interventions:     Therapeutic Ex (93918)   Min: 28 Sets/sec Reps Notes/CUES   Retro Stepper/BIKE 5'  Resistance 5 - increase as able   Alter G      Bridge Cues for core and glute engagement   LAQ in chair 2 15 4lb   Hip rotations/abductions in chair 2 15 4lb   SLR   HEP   Supine hip abd iso   HEP; Black AK   Standing hip abd 2 15 4lb    Standing Hip ext with forearms on wall 2 15  Airex under forearms, 4lb   Standing hamstring curls 4lb B   Seated rows Silver looped band   Supine LTR For back mobility   Leg Press DL 2 15 95#         SC fwd/bwd walks SBA, 1 yellow SC around waist   Glute side walks 3#       Supine fig 4 hip stretch bilat   Seated EOB heel slides up shin Pt seated EOB leaning back with hands on table    Step ups/ecc step down 2 10 6inch, 2 sets per leg   Swissball wall rolls- in SLS- hip drive      Quad hip ext/wall-ball rolls      TB fwd seated ball rolls    TB lat seated ball rolls    Manual Intervention (59935)  Min:      Knee mobs/PROM      Tib/Fem Mobs      Patella Mobs      Ankle mobs                  NMR re-education (13851)  Min: 0   CUES NEEDED   Rhomberg balance - airex Eyes open at half wall; mod difficulty   Tandem stance Level ground @ 1/2 wall   Line walking Lat; fwd; working on weight shift   ST walks Medium ST down line   NBOS on Airex+ball reaches to wall 2 10 Yellow plyoball, SBA   Airex marches At wall, fingertip support   Toe touches on box  8in box   Step pattern react 1 10    Stepping pattern  1 10 3 way   NBOS on Airex with cross-body reaches 2 10 SBA   [employer] Balance machine  L7, 2 min each: random control, maze control, limits of stability   Therapeutic Activity (53077)  Min: 14      Ladders      KB box tap Green KB   Standing marches  2 10 4lb on ankles   Sit to stand 2 10 Chair + 1 airex- challenging with lower seat height   Step up 2 5 [place]; half wall, 8in box   Mini squats at wall 1 10 BUE support from half wall       Therapeutic Exercise and NMR EXR  [x] (71982) Provided verbal/tactile cueing for activities related to strengthening, flexibility, endurance, ROM for improvements in LE, proximal hip, and core control with self care, mobility, lifting, ambulation. [x] (22613) Provided verbal/tactile cueing for activities related to improving balance, coordination, kinesthetic sense, posture, motor skill, proprioception  to assist with LE, proximal hip, and core control in self care, mobility, lifting, ambulation and eccentric single leg control.      NMR and Therapeutic Activities:    [x] (05723 or 12996) Provided verbal/tactile cueing for activities related to improving balance, coordination, kinesthetic sense, posture, motor skill, proprioception and motor activation to allow for proper function of core, proximal hip and LE with self care and ADLs and functional mobility.   [] (48005) Gait Re-education- Provided training and instruction to the patient for proper LE, core and proximal hip recruitment and positioning and eccentric body weight control with ambulation re-education including up and down stairs     Home Exercise Program:    [x] (27038) Reviewed/Progressed HEP activities related to strengthening, flexibility, endurance, ROM of core, proximal hip and LE for functional self-care, mobility, lifting and ambulation/stair navigation   [] (22491)Reviewed/Progressed HEP activities related to improving balance, coordination, kinesthetic sense, posture, motor skill, proprioception of core, proximal hip and LE for self care, mobility, lifting, and ambulation/stair navigation      Manual Treatments:  PROM / STM / Oscillations-Mobs:  G-I, II, III, IV (PA's, Inf., Post.)  [] (45216) Provided manual therapy to mobilize LE, proximal hip and/or LS spine soft tissue/joints for the purpose of modulating pain, promoting relaxation,  increasing ROM, reducing/eliminating soft tissue swelling/inflammation/restriction, improving soft tissue extensibility and allowing for proper ROM for normal function with self care, mobility, lifting and ambulation. Modalities:     [] GAME READY (VASO)- for significant edema, swelling, pain control. Charges:  Timed Code Treatment Minutes: 42   Total Treatment Minutes: 45      [] EVAL (LOW) 29690 (typically 20 minutes face-to-face)  [] EVAL (MOD) 71318 (typically 30 minutes face-to-face)  [] EVAL (HIGH) 54121 (typically 45 minutes face-to-face)  [] RE-EVAL     [x] YN(03254) x  2  [] DRY NEEDLE 1 OR 2 MUSCLES  [] NMR (42855) x     [] DRY NEEDLE 3+ MUSCLES  [] Manual (94104) x       [x] TA (13951) x  1    [] Mech Traction (02446)  [] ES(attended) (15253)     [] ES (un) (68236):   [] VASO (94890)  [] Other:      GOALS  Patient stated goal: \"be able to get in and out of the tractor\"  [] Progressing: [] Met: [x] Not Met: [] Adjusted    Therapist goals for Patient:   Short Term Goals: To be achieved in: 2 weeks  1. Independent in HEP and progression per patient tolerance, in order to prevent re-injury. [] Progressing: [] Met: [x] Not Met: [] Adjusted  2. Patient will have a decrease in pain to facilitate improvement in movement, function, and ADLs as indicated by Functional Deficits. [] Progressing: [] Met: [x] Not Met: [] Adjusted    Long Term Goals: To be achieved in: 8 weeks  1. Disability index score of 10% or less for the LEFS to assist with reaching prior level of function. [] Progressing: [] Met: [x] Not Met: [] Adjusted  2. Patient will demonstrate increased AROM to full LE baljeet to allow for proper joint functioning as indicated by patients Functional Deficits. [] Progressing: [] Met: [x] Not Met: [] Adjusted  3. Patient will demonstrate an increase in Strength to good proximal hip strength and control, within 5lb HHD in LE to allow for proper functional mobility as indicated by patients Functional Deficits. [] Progressing: [] Met: [x] Not Met: [] Adjusted  4.  Patient will return to daily functional activities without increased symptoms or restriction. [] Progressing: [] Met: [x] Not Met: [] Adjusted  5. Patient will be able to stand from a standard height chair without increased symptoms or restrictions. [] Progressing: [] Met: [x] Not Met: [] Adjusted  5. BY DISCHARGE: Pt will be able to get in and out of the tractor at the end of the day without assistance or restrictions. [] Progressing: [] Met: [x] Not Met: [] Adjusted     ASSESSMENT:  Good tolerance to exercises today; stamina is improving where Pt does not require as many rest breaks during exercises. Is challenged w/ balance likely increased due to history of co morbidities. Pt does have difficult time w/ anterior weight shift 2/2 neuropathy. Requires cues for eccentric control and preventing hyperextension through knees. Has slight increase in difficulty w/ LLE single leg loading 2/2 history of stroke. Increased time for task completion and rest breaks. Continue to progress functional strength and balance to improve independence and decrease falls risk. Treatment/Activity Tolerance:  [x] Patient tolerated treatment well [] Patient limited by fatique  [] Patient limited by pain  [] Patient limited by other medical complications  [] Other:     Overall Progression Towards Functional goals/ Treatment Progress Update:  [x] Patient is progressing as expected towards functional goals listed. [] Progression is slowed due to complexities/Impairments listed. [] Progression has been slowed due to co-morbidities.   [] Plan just implemented, too soon to assess goals progression <30days   [] Goals require adjustment due to lack of progress  [] Patient is not progressing as expected and requires additional follow up with physician  [] Other    Prognosis for POC: [x] Good [] Fair  [] Poor    Patient requires continued skilled intervention: [x] Yes  [] No        PLAN: 1-2x/week x8 weeks  [x] Continue per plan of care [] Alter current plan (see comments)  [] Plan of care initiated [] Hold pending MD visit [] Discharge    Electronically signed by: Alondra Cooley PTA    Note: If patient does not return for scheduled/recommended follow up visits, this note will serve as a discharge from care along with the most recent update on progress.

## 2023-02-16 ENCOUNTER — HOSPITAL ENCOUNTER (OUTPATIENT)
Dept: PHYSICAL THERAPY | Age: 73
Setting detail: THERAPIES SERIES
Discharge: HOME | End: 2023-02-16
Payer: MEDICARE

## 2023-02-16 PROCEDURE — 97530 THERAPEUTIC ACTIVITIES: CPT

## 2023-02-16 PROCEDURE — 97112 NEUROMUSCULAR REEDUCATION: CPT

## 2023-02-16 PROCEDURE — 97110 THERAPEUTIC EXERCISES: CPT

## 2023-02-16 NOTE — FLOWSHEET NOTE
[place] [address] [facility]  Phone: [phone] Fax: [phone]              Physical Therapy Treatment Note/ Progress Report:       Date:  2023    Patient Name:  Shakira Charlton    :  1950  MRN: [id number]  Restrictions/Precautions:    Medical/Treatment Diagnosis Information:  Diagnosis: M62.81 muscle weakness, generalized  Treatment Diagnosis: C08.51  Insurance/Certification information:  PT Insurance Information: Medicare; [place]; ded met; [place]  Physician Information:  Tai [facility] signed (Y/N):     Date of Patient follow up with Physician:      Progress Report: []  Yes  [x]  No     Date Range for reporting period:  Beginnin2023  Endin days or 25 visits    Progress report due (10 Rx/or 30 days whichever is less):     Recertification due (POC duration/ or 90 days whichever is less): 3/11/2023    Visit # Insurance Allowable Auth Needed   17 [place] []Yes    [x]No     Latex Allergy:  [x]NO      []YES  Preferred Language for Healthcare:   [x]English       []other:    Functional Scale: LEFS:   Date assessed: 2023    Pain level:  1/10 low back     SUBJECTIVE:  Pt reports that he had to climb into a tall truck a few times yesterday. He was able to do it with the help of his arms, but felt fatigued after. Feeling a little sore through the low back today.      OBJECTIVE:   Observation:   Test measurements:      2023  ROM LEFT RIGHT   Lumbar flex FT to ankles; *seated   Lumbar ext 5 deg *seated   Lumbar rotation 70% 75%           HIP Flex 109        RESTRICTIONS/PRECAUTIONS: hx of stroke- affected L side; L side vision limited; increased falls risk    Exercises/Interventions:     Therapeutic Ex (66297)   Min: 16 Sets/sec Reps Notes/CUES   Retro Stepper/BIKE 5'  Resistance 9 - increase as able   Alter G      Bridge Cues for core and glute engagement   LAQ in chair 2 15 4lb   Hip rotations/abductions in chair 2 15 4lb   SLR   HEP   Supine hip abd iso   HEP; Black AK   Standing hip abd 2 15 4lb    Standing Hip ext with forearms on wall 2 15  Airex under forearms, 4lb   Standing hamstring curls 4lb B   Seated rows Silver looped band   Supine LTR For back mobility   Leg Press DL 2 15 100#   Cybex hamstring curl 2 15 50# DL   Cybex leg ext  2 15 35# DL   SC fwd/bwd walks SBA, 1 yellow SC around waist   Glute side walks 3#       Supine fig 4 hip stretch bilat   Seated EOB heel slides up shin Pt seated EOB leaning back with hands on table    Step ups/ecc step down 2 10 6inch, 2 sets per leg   Swissball wall rolls- in SLS- hip drive      Quad hip ext/wall-ball rolls      TB fwd seated ball rolls    TB lat seated ball rolls    Manual Intervention (63819)  Min:      Knee mobs/PROM      Tib/Fem Mobs      Patella Mobs      Ankle mobs                  NMR re-education (46405)  Min: 14   CUES NEEDED   Rhomberg balance - airex Eyes open at half wall; mod difficulty   Tandem stance Level ground @ 1/2 wall   Line walking Lat; fwd; working on weight shift   ST walks Medium ST down line   NBOS on Airex+ball reaches to wall 2 10 Yellow plyoball, SBA   Tandem stance on Airex 30sec 4 At wall for assist   Airex marches 2 10 At wall, fingertip support   Toe touches on box  2 10 12in box   Step pattern react 1 10    Stepping pattern  1 10 3 way   NBOS on Airex with cross-body reaches 2 10 SBA   [employer] Balance machine  L7, 2 min each: random control, maze control, limits of stability   Therapeutic Activity (72830)  Min: 10      Ladders      KB box tap Green KB   Standing marches  2 10 4lb on ankles   Sit to stand 2 10 Chair + 2 airex   Step up 2 5 [place]; half wall, 8in box   Mini squats at wall 5sec 10 BUE support from half wall, focused on holds       Therapeutic Exercise and NMR EXR  [x] (68500) Provided verbal/tactile cueing for activities related to strengthening, flexibility, endurance, ROM for improvements in LE, proximal hip, and core control with self care, mobility, lifting, ambulation. [x] (67184) Provided verbal/tactile cueing for activities related to improving balance, coordination, kinesthetic sense, posture, motor skill, proprioception  to assist with LE, proximal hip, and core control in self care, mobility, lifting, ambulation and eccentric single leg control. NMR and Therapeutic Activities:    [x] (85607 or 68423) Provided verbal/tactile cueing for activities related to improving balance, coordination, kinesthetic sense, posture, motor skill, proprioception and motor activation to allow for proper function of core, proximal hip and LE with self care and ADLs and functional mobility.   [] (92849) Gait Re-education- Provided training and instruction to the patient for proper LE, core and proximal hip recruitment and positioning and eccentric body weight control with ambulation re-education including up and down stairs     Home Exercise Program:    [x] (20142) Reviewed/Progressed HEP activities related to strengthening, flexibility, endurance, ROM of core, proximal hip and LE for functional self-care, mobility, lifting and ambulation/stair navigation   [] (66246)Reviewed/Progressed HEP activities related to improving balance, coordination, kinesthetic sense, posture, motor skill, proprioception of core, proximal hip and LE for self care, mobility, lifting, and ambulation/stair navigation      Manual Treatments:  PROM / STM / Oscillations-Mobs:  G-I, II, III, IV (PA's, Inf., Post.)  [] (36124) Provided manual therapy to mobilize LE, proximal hip and/or LS spine soft tissue/joints for the purpose of modulating pain, promoting relaxation,  increasing ROM, reducing/eliminating soft tissue swelling/inflammation/restriction, improving soft tissue extensibility and allowing for proper ROM for normal function with self care, mobility, lifting and ambulation.      Modalities:     [] GAME READY (VASO)- for significant edema, swelling, pain control. Charges:  Timed Code Treatment Minutes: 40   Total Treatment Minutes: 45      [] EVAL (LOW) 17589 (typically 20 minutes face-to-face)  [] EVAL (MOD) 11276 (typically 30 minutes face-to-face)  [] EVAL (HIGH) 19973 (typically 45 minutes face-to-face)  [] RE-EVAL     [x] LW(51543) x  1  [] DRY NEEDLE 1 OR 2 MUSCLES  [x] NMR (82096) x 1    [] DRY NEEDLE 3+ MUSCLES  [] Manual (23330) x       [x] TA (29189) x  1    [] Mech Traction (39719)  [] ES(attended) (71324)     [] ES (un) (82821):   [] VASO (66529)  [] Other:      GOALS  Patient stated goal: \"be able to get in and out of the tractor\"  [] Progressing: [] Met: [x] Not Met: [] Adjusted    Therapist goals for Patient:   Short Term Goals: To be achieved in: 2 weeks  1. Independent in HEP and progression per patient tolerance, in order to prevent re-injury. [] Progressing: [] Met: [x] Not Met: [] Adjusted  2. Patient will have a decrease in pain to facilitate improvement in movement, function, and ADLs as indicated by Functional Deficits. [] Progressing: [] Met: [x] Not Met: [] Adjusted    Long Term Goals: To be achieved in: 8 weeks  1. Disability index score of 10% or less for the LEFS to assist with reaching prior level of function. [] Progressing: [] Met: [x] Not Met: [] Adjusted  2. Patient will demonstrate increased AROM to full LE baljeet to allow for proper joint functioning as indicated by patients Functional Deficits. [] Progressing: [] Met: [x] Not Met: [] Adjusted  3. Patient will demonstrate an increase in Strength to good proximal hip strength and control, within 5lb HHD in LE to allow for proper functional mobility as indicated by patients Functional Deficits. [] Progressing: [] Met: [x] Not Met: [] Adjusted  4. Patient will return to daily functional activities without increased symptoms or restriction. [] Progressing: [] Met: [x] Not Met: [] Adjusted  5.  Patient will be able to stand from a standard height chair without increased symptoms or restrictions. [] Progressing: [] Met: [x] Not Met: [] Adjusted  5. BY DISCHARGE: Pt will be able to get in and out of the tractor at the end of the day without assistance or restrictions. [] Progressing: [] Met: [x] Not Met: [] Adjusted     ASSESSMENT:  Good tolerance to increased resistance today. Is challenged w/ balance likely increased due to history of co morbidities. Pt does have difficult time w/ anterior weight shift 2/2 neuropathy. Requires cues for eccentric control and preventing hyperextension through knees. Has slight increase in difficulty w/ LLE single leg loading 2/2 history of stroke. Increased time for task completion and rest breaks. Continue to progress functional strength and balance to improve independence and decrease falls risk. Treatment/Activity Tolerance:  [x] Patient tolerated treatment well [] Patient limited by fatique  [] Patient limited by pain  [] Patient limited by other medical complications  [] Other:     Overall Progression Towards Functional goals/ Treatment Progress Update:  [x] Patient is progressing as expected towards functional goals listed. [] Progression is slowed due to complexities/Impairments listed. [] Progression has been slowed due to co-morbidities.   [] Plan just implemented, too soon to assess goals progression <30days   [] Goals require adjustment due to lack of progress  [] Patient is not progressing as expected and requires additional follow up with physician  [] Other    Prognosis for POC: [x] Good [] Fair  [] Poor    Patient requires continued skilled intervention: [x] Yes  [] No        PLAN: 1-2x/week x8 weeks  [x] Continue per plan of care [] Alter current plan (see comments)  [] Plan of care initiated [] Hold pending MD visit [] Discharge    Electronically signed by: Cherylene Canner, PTA    Note: If patient does not return for scheduled/recommended follow up visits, this note will serve as a discharge from care along with the most recent update on progress.

## 2023-02-21 ENCOUNTER — HOSPITAL ENCOUNTER (OUTPATIENT)
Dept: PHYSICAL THERAPY | Age: 73
Setting detail: THERAPIES SERIES
Discharge: HOME | End: 2023-02-21
Payer: MEDICARE

## 2023-02-21 PROCEDURE — 97112 NEUROMUSCULAR REEDUCATION: CPT

## 2023-02-21 PROCEDURE — 97110 THERAPEUTIC EXERCISES: CPT

## 2023-02-21 NOTE — FLOWSHEET NOTE
[place] [address] [facility], [address]  Phone: [phone] Fax: [phone]              Physical Therapy Treatment Note/ Progress Report:       Date:  2023    Patient Name:  Vince Astorga    :  1950  MRN: [id number]  Restrictions/Precautions:    Medical/Treatment Diagnosis Information:  Diagnosis: M62.81 muscle weakness, generalized  Treatment Diagnosis: Q56.25  Insurance/Certification information:  PT Insurance Information: Medicare; [employer]; ded met; [place] visits  Physician Information:  Tai  [facility] signed (Y/N):     Date of Patient follow up with Physician:      Progress Report: []  Yes  [x]  No     Date Range for reporting period:  Beginnin2023  Endin days or 25 visits    Progress report due (10 Rx/or 30 days whichever is less):     Recertification due (POC duration/ or 90 days whichever is less): 3/11/2023    Visit # Insurance Allowable Auth Needed   18 [place] []Yes    [x]No     Latex Allergy:  [x]NO      []YES  Preferred Language for Healthcare:   [x]English       []other:    Functional Scale: LEFS:   Date assessed: 2023    Pain level:  /10 low back     SUBJECTIVE:  Pt reports that his back continues to feel sore off and on. His walking side to side down the saunders is going better.       OBJECTIVE:   Observation:   Test measurements:      2023  ROM LEFT RIGHT   Lumbar flex FT to ankles; *seated   Lumbar ext 5 deg *seated   Lumbar rotation 70% 75%           HIP Flex 109        RESTRICTIONS/PRECAUTIONS: hx of stroke- affected L side; L side vision limited; increased falls risk    Exercises/Interventions:     Therapeutic Ex (74432)   Min: 28 Sets/sec Reps Notes/CUES   Retro Stepper/BIKE 5'  Resistance 9 - increase as able   Alter G      Bridge Cues for core and glute engagement   LAQ in chair 2 15 4lb   Hip rotations/abductions in chair 2 15 4lb   SLR   HEP   Standing marches 2 15 5lb   Standing hip abd 2 15 5lb    Standing Hip ext with forearms on wall 2 15  Airex under forearms, 5lb   Standing hamstring curls 2 15 5lb B   Seated rows Silver looped band   Supine LTR For back mobility   Leg Press DL 2 15 90#   Cybex hamstring curl 2 15 45# DL   Cybex leg ext  2 15 35# DL   SC fwd/bwd walks SBA, 1 yellow SC around waist   Glute side walks 3#       Supine fig 4 hip stretch bilat   Seated EOB heel slides up shin Pt seated EOB leaning back with hands on table    Step ups/ecc step down 2 10 6inch, 2 sets per leg   Swissball wall rolls- in SLS- hip drive      Quad hip ext/wall-ball rolls      TB fwd seated ball rolls    TB lat seated ball rolls    Manual Intervention (61029)  Min:      Knee mobs/PROM      Tib/Fem Mobs      Patella Mobs      Ankle mobs                  NMR re-education (39080)  Min: 12   CUES NEEDED   Rhomberg balance - airex Eyes open at half wall; mod difficulty   Tandem stance Level ground @ 1/2 wall   Line walking Lat; fwd; working on weight shift   ST walks Medium ST down line   NBOS on Airex+ball reaches to wall 2 10 Yellow plyoball, SBA   Tandem stance on Airex 30sec 4 At wall for assist   Airex marches 2 10 At wall, fingertip support   Toe touches on box  2 10 12in box   Step pattern react 1 10    Stepping pattern  1 10 3 way   NBOS on Airex with cross-body reaches 2 10 SBA   Biodex Balance machine  L7, 2 min each: random control, maze control, limits of stability   Therapeutic Activity (00522)  Min: 4            KB box tap Green KB   Standing marches  2 10 4lb on ankles   Sit to stand 2 10 Chair + 2 airex   Step up 2 10 [place]; half wall, 8in box   Mini squats at wall 5sec 10 BUE support from half wall, focused on holds       Therapeutic Exercise and NMR EXR  [x] (78134) Provided verbal/tactile cueing for activities related to strengthening, flexibility, endurance, ROM for improvements in LE, proximal hip, and core control with self care, mobility,  lifting, ambulation. [x] (44069) Provided verbal/tactile cueing for activities related to improving balance, coordination, kinesthetic sense, posture, motor skill, proprioception  to assist with LE, proximal hip, and core control in self care, mobility, lifting, ambulation and eccentric single leg control. NMR and Therapeutic Activities:    [x] (81442 or 05947) Provided verbal/tactile cueing for activities related to improving balance, coordination, kinesthetic sense, posture, motor skill, proprioception and motor activation to allow for proper function of core, proximal hip and LE with self care and ADLs and functional mobility.   [] (74613) Gait Re-education- Provided training and instruction to the patient for proper LE, core and proximal hip recruitment and positioning and eccentric body weight control with ambulation re-education including up and down stairs     Home Exercise Program:    [x] (27420) Reviewed/Progressed HEP activities related to strengthening, flexibility, endurance, ROM of core, proximal hip and LE for functional self-care, mobility, lifting and ambulation/stair navigation   [] (41469)Reviewed/Progressed HEP activities related to improving balance, coordination, kinesthetic sense, posture, motor skill, proprioception of core, proximal hip and LE for self care, mobility, lifting, and ambulation/stair navigation      Manual Treatments:  PROM / STM / Oscillations-Mobs:  G-I, II, III, IV (PA's, Inf., Post.)  [] (48541) Provided manual therapy to mobilize LE, proximal hip and/or LS spine soft tissue/joints for the purpose of modulating pain, promoting relaxation,  increasing ROM, reducing/eliminating soft tissue swelling/inflammation/restriction, improving soft tissue extensibility and allowing for proper ROM for normal function with self care, mobility, lifting and ambulation. Modalities:     [] GAME READY (VASO)- for significant edema, swelling, pain control.      Charges:  Timed Code Treatment Minutes: 44   Total Treatment Minutes: 46      [] EVAL (LOW) 32459 (typically 20 minutes face-to-face)  [] EVAL (MOD) 59728 (typically 30 minutes face-to-face)  [] EVAL (HIGH) 51826 (typically 45 minutes face-to-face)  [] RE-EVAL     [x] ZJ(28985) x  2  [] DRY NEEDLE 1 OR 2 MUSCLES  [x] NMR (79464) x 1    [] DRY NEEDLE 3+ MUSCLES  [] Manual (07844) x       [] TA (50321) x     [] Mech Traction (81720)  [] ES(attended) (82507)     [] ES (un) (24719):   [] VASO (54137)  [] Other:      GOALS  Patient stated goal: \"be able to get in and out of the tractor\"  [] Progressing: [] Met: [x] Not Met: [] Adjusted    Therapist goals for Patient:   Short Term Goals: To be achieved in: 2 weeks  1. Independent in HEP and progression per patient tolerance, in order to prevent re-injury. [] Progressing: [] Met: [x] Not Met: [] Adjusted  2. Patient will have a decrease in pain to facilitate improvement in movement, function, and ADLs as indicated by Functional Deficits. [] Progressing: [] Met: [x] Not Met: [] Adjusted    Long Term Goals: To be achieved in: 8 weeks  1. Disability index score of 10% or less for the LEFS to assist with reaching prior level of function. [] Progressing: [] Met: [x] Not Met: [] Adjusted  2. Patient will demonstrate increased AROM to full LE baljeet to allow for proper joint functioning as indicated by patients Functional Deficits. [] Progressing: [] Met: [x] Not Met: [] Adjusted  3. Patient will demonstrate an increase in Strength to good proximal hip strength and control, within 5lb HHD in LE to allow for proper functional mobility as indicated by patients Functional Deficits. [] Progressing: [] Met: [x] Not Met: [] Adjusted  4. Patient will return to daily functional activities without increased symptoms or restriction. [] Progressing: [] Met: [x] Not Met: [] Adjusted  5. Patient will be able to stand from a standard height chair without increased symptoms or restrictions.   [] Progressing: [] Met: [x] Not Met: [] Adjusted  5. BY DISCHARGE: Pt will be able to get in and out of the tractor at the end of the day without assistance or restrictions. [] Progressing: [] Met: [x] Not Met: [] Adjusted     ASSESSMENT:  Good tolerance to decreased rest time today. Is challenged w/ balance likely increased due to history of co morbidities. Pt does have difficult time w/ anterior weight shift 2/2 neuropathy. Requires cues for eccentric control and preventing hyperextension through knees. Has slight increase in difficulty w/ LLE single leg loading 2/2 history of stroke. Increased time for task completion and rest breaks. Continue to progress functional strength and balance to improve independence and decrease falls risk. Treatment/Activity Tolerance:  [x] Patient tolerated treatment well [] Patient limited by fatique  [] Patient limited by pain  [] Patient limited by other medical complications  [] Other:     Overall Progression Towards Functional goals/ Treatment Progress Update:  [x] Patient is progressing as expected towards functional goals listed. [] Progression is slowed due to complexities/Impairments listed. [] Progression has been slowed due to co-morbidities. [] Plan just implemented, too soon to assess goals progression <30days   [] Goals require adjustment due to lack of progress  [] Patient is not progressing as expected and requires additional follow up with physician  [] Other    Prognosis for POC: [x] Good [] Fair  [] Poor    Patient requires continued skilled intervention: [x] Yes  [] No        PLAN: 1-2x/week x8 weeks  [x] Continue per plan of care [] Alter current plan (see comments)  [] Plan of care initiated [] Hold pending MD visit [] Discharge    Electronically signed by: Tan Saenz PTA    Note: If patient does not return for scheduled/recommended follow up visits, this note will serve as a discharge from care along with the most recent update on progress.

## 2023-02-23 ENCOUNTER — HOSPITAL ENCOUNTER (OUTPATIENT)
Dept: PHYSICAL THERAPY | Age: 73
Setting detail: THERAPIES SERIES
Discharge: HOME | End: 2023-02-23
Payer: MEDICARE

## 2023-02-23 PROCEDURE — 97112 NEUROMUSCULAR REEDUCATION: CPT

## 2023-02-23 PROCEDURE — 97110 THERAPEUTIC EXERCISES: CPT

## 2023-02-23 NOTE — FLOWSHEET NOTE
[place] [address] [facility], [address]  Phone: [phone] Fax: [phone]              Physical Therapy Treatment Note/ Progress Report:       Date:  2023    Patient Name:  Duane Mariee    :  1950  MRN: [id number]  Restrictions/Precautions:    Medical/Treatment Diagnosis Information:  Diagnosis: M62.81 muscle weakness, generalized  Treatment Diagnosis: O05.40  Insurance/Certification information:  PT Insurance Information: Medicare; [facility]; ded met; [place] visits  Physician Information:  Tai [facility] signed (Y/N):     Date of Patient follow up with Physician:      Progress Report: []  Yes  [x]  No     Date Range for reporting period:  Beginnin2023  Endin days or 25 visits    Progress report due (10 Rx/or 30 days whichever is less): 3/15/3795    Recertification due (POC duration/ or 90 days whichever is less): 3/11/2023    Visit # Insurance Allowable Auth Needed   19 [place] []Yes    [x]No     Latex Allergy:  [x]NO      []YES  Preferred Language for Healthcare:   [x]English       []other:    Functional Scale: LEFS:   Date assessed: 2023    Pain level:  1/10 low back     SUBJECTIVE:  Pt reports that his balance isn't as good today, \"but we'll get there. \"  Pt notes that he was able to carry a 50lb bag of feed yesterday, \"but I did not attempt to carry the 100lb bag. \"    OBJECTIVE:   Observation:   Test measurements:      2023  ROM LEFT RIGHT   Lumbar flex FT to ankles; *seated   Lumbar ext 5 deg *seated   Lumbar rotation 70% 75%           HIP Flex 109        RESTRICTIONS/PRECAUTIONS: hx of stroke- affected L side; L side vision limited; increased falls risk    Exercises/Interventions:     Therapeutic Ex (16399)   Min: 30 Sets/sec Reps Notes/CUES   Retro Stepper/BIKE 5'  Resistance 10 - increase as able   Alter G      Bridge Cues for core and glute engagement   LAQ in chair 2 15 4lb   Hip rotations/abductions in chair 2 15 4lb   SLR   HEP   Standing marches 2 15 5lb   Standing hip abd 2 15 5lb    Standing Hip ext with forearms on wall 2 15  Airex under forearms, 5lb   Standing hamstring curls 2 15 5lb B   Seated rows Silver looped band   Supine LTR For back mobility   Leg Press DL 2 15 100#   Cybex hamstring curl 2 15 35# DL   Cybex leg ext  2 15 35# DL   SC fwd/bwd walks SBA, 1 yellow SC around waist   Glute side walks 3#       Supine fig 4 hip stretch bilat   Seated EOB heel slides up shin Pt seated EOB leaning back with hands on table    Step ups/ecc step down 2 10 6inch, 2 sets per leg   Swissball wall rolls- in SLS- hip drive      Quad hip ext/wall-ball rolls      TB fwd seated ball rolls    TB lat seated ball rolls    Manual Intervention (75679)  Min:      Knee mobs/PROM      Tib/Fem Mobs      Patella Mobs      Ankle mobs                  NMR re-education (27126)  Min: 10   CUES NEEDED   Rhomberg balance - airex Eyes open at half wall; mod difficulty   Tandem stance Level ground @ 1/2 wall   ST lateral walks 2 20' Lat; medium ST in hands   ST walks 4 20 Medium ST down line- fwd only   NBOS on Airex+ball reaches to wall 2 10 Yellow plyoball, SBA   Tandem stance on Airex 30sec 4 At wall for assist   Airex marches 2 10 At wall, fingertip support   Toe touches on box  2 10 12in box   Step pattern react 1 10    Stepping pattern  1 10 3 way   NBOS on Airex with cross-body reaches 2 10 SBA   Biodex Balance machine  L7, 2 min each: random control, maze control, limits of stability   Therapeutic Activity (27636)  Min: 4            KB box tap Green KB   Standing marches  2 10 4lb on ankles   Sit to stand 2 10 Chair + 2 airex   Step up 2 10 [place]; half wall, 8in box   Mini squats at wall 5sec 10 BUE support from half wall, focused on holds       Therapeutic Exercise and NMR EXR  [x] (70949) Provided verbal/tactile cueing for activities related to strengthening, flexibility, endurance, ROM for improvements in LE, proximal hip, and core control with self care, mobility, lifting, ambulation.  [x] (30628) Provided verbal/tactile cueing for activities related to improving balance, coordination, kinesthetic sense, posture, motor skill, proprioception  to assist with LE, proximal hip, and core control in self care, mobility, lifting, ambulation and eccentric single leg control.     NMR and Therapeutic Activities:    [x] (11112 or 69928) Provided verbal/tactile cueing for activities related to improving balance, coordination, kinesthetic sense, posture, motor skill, proprioception and motor activation to allow for proper function of core, proximal hip and LE with self care and ADLs and functional mobility.   [] (51259) Gait Re-education- Provided training and instruction to the patient for proper LE, core and proximal hip recruitment and positioning and eccentric body weight control with ambulation re-education including up and down stairs     Home Exercise Program:    [x] (71354) Reviewed/Progressed HEP activities related to strengthening, flexibility, endurance, ROM of core, proximal hip and LE for functional self-care, mobility, lifting and ambulation/stair navigation   [] (05590)Reviewed/Progressed HEP activities related to improving balance, coordination, kinesthetic sense, posture, motor skill, proprioception of core, proximal hip and LE for self care, mobility, lifting, and ambulation/stair navigation      Manual Treatments:  PROM / STM / Oscillations-Mobs:  G-I, II, III, IV (PA's, Inf., Post.)  [] (55164) Provided manual therapy to mobilize LE, proximal hip and/or LS spine soft tissue/joints for the purpose of modulating pain, promoting relaxation,  increasing ROM, reducing/eliminating soft tissue swelling/inflammation/restriction, improving soft tissue extensibility and allowing for proper ROM for normal function with self care, mobility, lifting and ambulation.     Modalities:     [] GAME READY (VASO)-  for significant edema, swelling, pain control. Charges:  Timed Code Treatment Minutes: 44   Total Treatment Minutes: 46      [] EVAL (LOW) 38713 (typically 20 minutes face-to-face)  [] EVAL (MOD) 98011 (typically 30 minutes face-to-face)  [] EVAL (HIGH) 19031 (typically 45 minutes face-to-face)  [] RE-EVAL     [x] CX(54333) x  2  [] DRY NEEDLE 1 OR 2 MUSCLES  [x] NMR (76312) x 1    [] DRY NEEDLE 3+ MUSCLES  [] Manual (91457) x       [] TA (10490) x     [] Mech Traction (97627)  [] ES(attended) (05721)     [] ES (un) (25151):   [] VASO (35008)  [] Other:      GOALS  Patient stated goal: \"be able to get in and out of the tractor\"  [] Progressing: [] Met: [x] Not Met: [] Adjusted    Therapist goals for Patient:   Short Term Goals: To be achieved in: 2 weeks  1. Independent in HEP and progression per patient tolerance, in order to prevent re-injury. [] Progressing: [] Met: [x] Not Met: [] Adjusted  2. Patient will have a decrease in pain to facilitate improvement in movement, function, and ADLs as indicated by Functional Deficits. [] Progressing: [] Met: [x] Not Met: [] Adjusted    Long Term Goals: To be achieved in: 8 weeks  1. Disability index score of 10% or less for the LEFS to assist with reaching prior level of function. [] Progressing: [] Met: [x] Not Met: [] Adjusted  2. Patient will demonstrate increased AROM to full LE baljeet to allow for proper joint functioning as indicated by patients Functional Deficits. [] Progressing: [] Met: [x] Not Met: [] Adjusted  3. Patient will demonstrate an increase in Strength to good proximal hip strength and control, within 5lb HHD in LE to allow for proper functional mobility as indicated by patients Functional Deficits. [] Progressing: [] Met: [x] Not Met: [] Adjusted  4. Patient will return to daily functional activities without increased symptoms or restriction. [] Progressing: [] Met: [x] Not Met: [] Adjusted  5.  Patient will be able to stand from a standard height chair without increased symptoms or restrictions. [] Progressing: [] Met: [x] Not Met: [] Adjusted  5. BY DISCHARGE: Pt will be able to get in and out of the tractor at the end of the day without assistance or restrictions. [] Progressing: [] Met: [x] Not Met: [] Adjusted     ASSESSMENT:  Good tolerance to decreased rest time today. Appropriately challenged with slosh tube walking both forward and laterally. Pt is challenged w/ balance likely increased due to history of co morbidities. Pt does have difficult time w/ anterior weight shift 2/2 neuropathy. Requires cues for eccentric control and preventing hyperextension through knees. Has slight increase in difficulty w/ LLE single leg loading 2/2 history of stroke. Increased time for task completion and rest breaks. Continue to progress functional strength and balance to improve independence and decrease falls risk. Treatment/Activity Tolerance:  [x] Patient tolerated treatment well [] Patient limited by fatique  [] Patient limited by pain  [] Patient limited by other medical complications  [] Other:     Overall Progression Towards Functional goals/ Treatment Progress Update:  [x] Patient is progressing as expected towards functional goals listed. [] Progression is slowed due to complexities/Impairments listed. [] Progression has been slowed due to co-morbidities.   [] Plan just implemented, too soon to assess goals progression <30days   [] Goals require adjustment due to lack of progress  [] Patient is not progressing as expected and requires additional follow up with physician  [] Other    Prognosis for POC: [x] Good [] Fair  [] Poor    Patient requires continued skilled intervention: [x] Yes  [] No        PLAN: 1-2x/week x8 weeks  [x] Continue per plan of care [] Alter current plan (see comments)  [] Plan of care initiated [] Hold pending MD visit [] Discharge    Electronically signed by: Tay Espinal PTA    Note: If patient does not return for scheduled/recommended follow up visits, this note will serve as a discharge from care along with the most recent update on progress.

## 2023-02-28 ENCOUNTER — HOSPITAL ENCOUNTER (OUTPATIENT)
Dept: PHYSICAL THERAPY | Age: 73
Setting detail: THERAPIES SERIES
Discharge: HOME | End: 2023-02-28
Payer: MEDICARE

## 2023-02-28 PROCEDURE — 97112 NEUROMUSCULAR REEDUCATION: CPT

## 2023-02-28 PROCEDURE — 97110 THERAPEUTIC EXERCISES: CPT

## 2023-02-28 NOTE — FLOWSHEET NOTE
[address] [address]  Phone: [phone] Fax: [phone]              Physical Therapy Treatment Note/ Progress Report:       Date:  2023    Patient Name:  John Davidson    :  1950  MRN: [id number]  Restrictions/Precautions:    Medical/Treatment Diagnosis Information:  Diagnosis: M62.81 muscle weakness, generalized  Treatment Diagnosis: W03.32  Insurance/Certification information:  PT Insurance Information: Medicare; [place]; ded met; [place] visits  Physician Information:  Tai [facility] signed (Y/N):     Date of Patient follow up with Physician:      Progress Report: []  Yes  [x]  No     Date Range for reporting period:  Beginnin2023  Endin days or 25 visits    Progress report due (10 Rx/or 30 days whichever is less): 4493    Recertification due (POC duration/ or 90 days whichever is less): 3/11/2023    Visit # Insurance Allowable Auth Needed   20 [place] []Yes    [x]No     Latex Allergy:  [x]NO      []YES  Preferred Language for Healthcare:   [x]English       []other:    Functional Scale: LEFS:   Date assessed: 2023    Pain level:  10 low back     SUBJECTIVE:  Pt reports that his back was hurting last night. He took \"an 800\" this morning to help with the soreness. \"It might be time for me to get another injection\" for his back.      OBJECTIVE:   Observation:   Test measurements:      2023  ROM LEFT RIGHT   Lumbar flex FT to ankles; *seated   Lumbar ext 5 deg *seated   Lumbar rotation 70% 75%           HIP Flex 109        RESTRICTIONS/PRECAUTIONS: hx of stroke- affected L side; L side vision limited; increased falls risk    Exercises/Interventions:     Therapeutic Ex (34113)   Min: 30 Sets/sec Reps Notes/CUES   Retro Stepper/BIKE 5'  Resistance 10 - increase as able   LTR 1 10    Bridge Cues for core and glute engagement   LAQ in chair 2 15 4lb   Hip rotations/abductions in chair 2 15 4lb   Step ups 2 10 8in with 2UE assist at wall   Standing marches 2 15 5lb   Standing hip abd 2 15 5lb    Standing Hip ext with forearms on wall 2 15  Airex under forearms, 5lb   Standing hamstring curls 2 15 5lb B   Seated rows Silver looped band   Supine LTR For back mobility   Leg Press DL 2 15 100#   Cybex hamstring curl 2 15 35# DL   Cybex leg ext  2 15 35# DL   SC fwd/bwd walks 10 CGA, 1 blue SC around waist   Glute side walks 3#       Supine fig 4 hip stretch 10sec bilat   Seated EOB heel slides up shin Pt seated EOB leaning back with hands on table    Step ups/ecc step down 2 10 6inch, 2 sets per leg   Swissball wall rolls- in SLS- hip drive      Quad hip ext/wall-ball rolls      TB fwd seated ball rolls    TB lat seated ball rolls    Manual Intervention (30970)  Min:      Knee mobs/PROM      Tib/Fem Mobs      Patella Mobs      Ankle mobs                  NMR re-education (83838)  Min: 10   CUES NEEDED   Rhomberg balance - airex Eyes open at half wall; mod difficulty   Tandem stance Level ground @ 1/2 wall   ST lateral walks 2 20' Lat; medium ST in hands   ST walks 4 20 Medium ST down line- fwd only   NBOS on Airex+ball reaches to wall 2 10 Yellow plyoball, SBA   Tandem stance on Airex 30sec 4 At wall for assist   Airex marches 2 10 At wall, fingertip support   Toe touches on box  2 10 12in box   Step pattern react 1 10    Stepping pattern  1 10 3 way   NBOS on Airex with cross-body reaches 2 10 SBA   Biodex Balance machine  L7, 2 min each: random control, maze control, limits of stability   Therapeutic Activity (62182)  Min: 4            KB box tap Green KB   Standing marches  2 10 4lb on ankles   Sit to stand 2 10 Chair + 2 airex   Step up 2 10 [place]; half wall, 8in box   Mini squats at wall 5sec 10 BUE support from half wall, focused on holds       Therapeutic Exercise and NMR EXR  [x] (06766) Provided verbal/tactile cueing for activities related to strengthening, flexibility, endurance, ROM for improvements in LE, proximal hip, and core control with self care, mobility, lifting, ambulation. [x] (16035) Provided verbal/tactile cueing for activities related to improving balance, coordination, kinesthetic sense, posture, motor skill, proprioception  to assist with LE, proximal hip, and core control in self care, mobility, lifting, ambulation and eccentric single leg control. NMR and Therapeutic Activities:    [x] (13056 or 04981) Provided verbal/tactile cueing for activities related to improving balance, coordination, kinesthetic sense, posture, motor skill, proprioception and motor activation to allow for proper function of core, proximal hip and LE with self care and ADLs and functional mobility.   [] (37764) Gait Re-education- Provided training and instruction to the patient for proper LE, core and proximal hip recruitment and positioning and eccentric body weight control with ambulation re-education including up and down stairs     Home Exercise Program:    [x] (27626) Reviewed/Progressed HEP activities related to strengthening, flexibility, endurance, ROM of core, proximal hip and LE for functional self-care, mobility, lifting and ambulation/stair navigation   [] (11600)Reviewed/Progressed HEP activities related to improving balance, coordination, kinesthetic sense, posture, motor skill, proprioception of core, proximal hip and LE for self care, mobility, lifting, and ambulation/stair navigation      Manual Treatments:  PROM / STM / Oscillations-Mobs:  G-I, II, III, IV (PA's, Inf., Post.)  [] (23783) Provided manual therapy to mobilize LE, proximal hip and/or LS spine soft tissue/joints for the purpose of modulating pain, promoting relaxation,  increasing ROM, reducing/eliminating soft tissue swelling/inflammation/restriction, improving soft tissue extensibility and allowing for proper ROM for normal function with self care, mobility, lifting and ambulation. Modalities:     [] GAME READY (VASO)- for significant edema, swelling, pain control. Charges:  Timed Code Treatment Minutes: 44   Total Treatment Minutes: 44      [] EVAL (LOW) 03998 (typically 20 minutes face-to-face)  [] EVAL (MOD) 40843 (typically 30 minutes face-to-face)  [] EVAL (HIGH) 64716 (typically 45 minutes face-to-face)  [] RE-EVAL     [x] JU(74383) x  2  [] DRY NEEDLE 1 OR 2 MUSCLES  [x] NMR (53278) x 1    [] DRY NEEDLE 3+ MUSCLES  [] Manual (52838) x       [] TA (16963) x     [] Mech Traction (39249)  [] ES(attended) (21024)     [] ES (un) (79486):   [] VASO (51542)  [] Other:      GOALS  Patient stated goal: \"be able to get in and out of the tractor\"  [] Progressing: [] Met: [x] Not Met: [] Adjusted    Therapist goals for Patient:   Short Term Goals: To be achieved in: 2 weeks  1. Independent in HEP and progression per patient tolerance, in order to prevent re-injury. [] Progressing: [] Met: [x] Not Met: [] Adjusted  2. Patient will have a decrease in pain to facilitate improvement in movement, function, and ADLs as indicated by Functional Deficits. [] Progressing: [] Met: [x] Not Met: [] Adjusted    Long Term Goals: To be achieved in: 8 weeks  1. Disability index score of 10% or less for the LEFS to assist with reaching prior level of function. [] Progressing: [] Met: [x] Not Met: [] Adjusted  2. Patient will demonstrate increased AROM to full LE baljeet to allow for proper joint functioning as indicated by patients Functional Deficits. [] Progressing: [] Met: [x] Not Met: [] Adjusted  3. Patient will demonstrate an increase in Strength to good proximal hip strength and control, within 5lb HHD in LE to allow for proper functional mobility as indicated by patients Functional Deficits. [] Progressing: [] Met: [x] Not Met: [] Adjusted  4. Patient will return to daily functional activities without increased symptoms or restriction. [] Progressing: [] Met: [x] Not Met: [] Adjusted  5. Patient will be able to stand from a standard height chair without increased symptoms or restrictions. [] Progressing: [] Met: [x] Not Met: [] Adjusted  5. BY DISCHARGE: Pt will be able to get in and out of the tractor at the end of the day without assistance or restrictions. [] Progressing: [] Met: [x] Not Met: [] Adjusted     ASSESSMENT:  Limited somewhat by back pain today. LE's tolerated strengthening well overall. Pt is challenged w/ balance likely increased due to history of co morbidities. Pt does have difficult time w/ anterior weight shift 2/2 neuropathy. Requires cues for eccentric control and preventing hyperextension through knees. Has slight increase in difficulty w/ LLE single leg loading 2/2 history of stroke. Increased time for task completion and rest breaks. Continue to progress functional strength and balance to improve independence and decrease falls risk. Treatment/Activity Tolerance:  [x] Patient tolerated treatment well [] Patient limited by fatique  [] Patient limited by pain  [] Patient limited by other medical complications  [] Other:     Overall Progression Towards Functional goals/ Treatment Progress Update:  [x] Patient is progressing as expected towards functional goals listed. [] Progression is slowed due to complexities/Impairments listed. [] Progression has been slowed due to co-morbidities.   [] Plan just implemented, too soon to assess goals progression <30days   [] Goals require adjustment due to lack of progress  [] Patient is not progressing as expected and requires additional follow up with physician  [] Other    Prognosis for POC: [x] Good [] Fair  [] Poor    Patient requires continued skilled intervention: [x] Yes  [] No        PLAN: 1-2x/week x8 weeks  [x] Continue per plan of care [] Alter current plan (see comments)  [] Plan of care initiated [] Hold pending MD visit [] Discharge    Electronically signed by: José Miguel Paris PTA    Note: If patient does not return for scheduled/recommended follow up visits, this note will serve as a discharge from care along with the most recent update on progress.

## 2023-03-02 ENCOUNTER — HOSPITAL ENCOUNTER (OUTPATIENT)
Dept: PHYSICAL THERAPY | Age: 73
Setting detail: THERAPIES SERIES
Discharge: HOME | End: 2023-03-02
Payer: MEDICARE

## 2023-03-02 PROCEDURE — 97110 THERAPEUTIC EXERCISES: CPT

## 2023-03-02 PROCEDURE — 97112 NEUROMUSCULAR REEDUCATION: CPT

## 2023-03-02 NOTE — FLOWSHEET NOTE
[address] [address] [address]  Phone: [phone] Fax: [phone]              Physical Therapy Treatment Note/ Progress Report:       Date:  2023    Patient Name:  Rory Rocha    :  1950  MRN: [id number]  Restrictions/Precautions:    Medical/Treatment Diagnosis Information:  Diagnosis: M62.81 muscle weakness, generalized  Treatment Diagnosis: U88.41  Insurance/Certification information:  PT Insurance Information: Medicare; [place]; ded met; [place] visits  Physician Information:  Tai  [facility] signed (Y/N):     Date of Patient follow up with Physician:      Progress Report: []  Yes  [x]  No     Date Range for reporting period:  Beginnin2023  Endin days or 25 visits    Progress report due (10 Rx/or 30 days whichever is less): 3/84/7968    Recertification due (POC duration/ or 90 days whichever is less): 3/11/2023    Visit # Insurance Allowable Auth Needed   21 [place] []Yes    [x]No     Latex Allergy:  [x]NO      []YES  Preferred Language for Healthcare:   [x]English       []other:    Functional Scale: LEFS:   Date assessed: 2023    Pain level:  1/10 low back     SUBJECTIVE:  Pt states he is having some more swelling in his ankle today so not sure how his balance is going to be. Feels like overall he is doing better and balancing better.     OBJECTIVE:   Observation:   Test measurements:      2023  ROM LEFT RIGHT   Lumbar flex FT to ankles; *seated   Lumbar ext 5 deg *seated   Lumbar rotation 70% 75%           HIP Flex 109        RESTRICTIONS/PRECAUTIONS: hx of stroke- affected L side; L side vision limited; increased falls risk    Exercises/Interventions:     Therapeutic Ex (58545)   Min: 30 Sets/sec Reps Notes/CUES   Retro Stepper/BIKE 5'  Resistance 10 - increase as able   LTR 1 10    Bridge Cues for core and glute engagement   LAQ in chair 2 15 4lb   Hip rotations/abductions in chair 2 15 4lb   Step ups 2 10 8in with 2UE assist at wall   Standing marches 2 15 5lb   Standing hip abd 2 15 5lb    Standing Hip ext with forearms on wall 2 15  Airex under forearms, 5lb   Standing hamstring curls 2 15 5lb B   Seated rows Silver looped band   Supine LTR For back mobility   Leg Press DL 2 15 100#   Cybex hamstring curl 2 15 35# DL   Cybex leg ext  2 15 35# DL   SC fwd/bwd walks 10 CGA, 1 blue SC around waist   Glute side walks 3#   Seated fwd ball rolls 1 10    Supine fig 4 hip stretch 10sec bilat   Seated EOB heel slides up shin Pt seated EOB leaning back with hands on table    Step ups/ecc step down 2 10 6inch, 2 sets per leg   Swissball wall rolls- in SLS- hip drive      Quad hip ext/wall-ball rolls      TB fwd seated ball rolls    TB lat seated ball rolls    Manual Intervention (41415)  Min:      Knee mobs/PROM      Tib/Fem Mobs      Patella Mobs      Ankle mobs                  NMR re-education (34787)  Min: 10   CUES NEEDED   Rhomberg balance - airex Eyes open at half wall; mod difficulty   Tandem stance Level ground @ 1/2 wall   ST lateral walks 2 20' Lat; medium ST in hands   ST walks 4 20 Medium ST down line- fwd only   NBOS on Airex+ball reaches to wall 2 10 Yellow plyoball, SBA   Tandem stance on Airex 30sec 4 At wall for assist   Airex marches 2 10 At wall, fingertip support   Toe touches on box  2 10 12in box   Step pattern react 1 10    Stepping pattern  1 10 3 way   NBOS on Airex with cross-body reaches 2 10 SBA   Biodex Balance machine  L7, 2 min each: random control, maze control, limits of stability   Therapeutic Activity (57822)  Min: 4            KB box tap Green KB   Standing marches  2 10 4lb on ankles   Sit to stand 2 10 Chair + 2 airex   Step up 2 10 [place]; half wall, 8in box   Mini squats at wall 5sec 10 BUE support from half wall, focused on holds       Therapeutic Exercise and NMR EXR  [x] (27763) Provided verbal/tactile cueing for activities related to strengthening, flexibility, endurance, ROM for improvements in LE, proximal hip, and core control with self care, mobility, lifting, ambulation. [x] (34239) Provided verbal/tactile cueing for activities related to improving balance, coordination, kinesthetic sense, posture, motor skill, proprioception  to assist with LE, proximal hip, and core control in self care, mobility, lifting, ambulation and eccentric single leg control.      NMR and Therapeutic Activities:    [x] (55738 or 64933) Provided verbal/tactile cueing for activities related to improving balance, coordination, kinesthetic sense, posture, motor skill, proprioception and motor activation to allow for proper function of core, proximal hip and LE with self care and ADLs and functional mobility.   [] (42387) Gait Re-education- Provided training and instruction to the patient for proper LE, core and proximal hip recruitment and positioning and eccentric body weight control with ambulation re-education including up and down stairs     Home Exercise Program:    [x] (37913) Reviewed/Progressed HEP activities related to strengthening, flexibility, endurance, ROM of core, proximal hip and LE for functional self-care, mobility, lifting and ambulation/stair navigation   [] (80871)Reviewed/Progressed HEP activities related to improving balance, coordination, kinesthetic sense, posture, motor skill, proprioception of core, proximal hip and LE for self care, mobility, lifting, and ambulation/stair navigation      Manual Treatments:  PROM / STM / Oscillations-Mobs:  G-I, II, III, IV (PA's, Inf., Post.)  [] (18849) Provided manual therapy to mobilize LE, proximal hip and/or LS spine soft tissue/joints for the purpose of modulating pain, promoting relaxation,  increasing ROM, reducing/eliminating soft tissue swelling/inflammation/restriction, improving soft tissue extensibility and allowing for proper ROM for normal function with self care, mobility, lifting and ambulation. Modalities:     [] GAME READY (VASO)- for significant edema, swelling, pain control. Charges:  Timed Code Treatment Minutes: 44   Total Treatment Minutes: 44      [] EVAL (LOW) 39759 (typically 20 minutes face-to-face)  [] EVAL (MOD) 07545 (typically 30 minutes face-to-face)  [] EVAL (HIGH) 74332 (typically 45 minutes face-to-face)  [] RE-EVAL     [x] IQ(24278) x  2  [] DRY NEEDLE 1 OR 2 MUSCLES  [x] NMR (98261) x 1    [] DRY NEEDLE 3+ MUSCLES  [] Manual (76843) x       [] TA (12375) x     [] Mech Traction (40772)  [] ES(attended) (38847)     [] ES (un) (01902):   [] VASO (24968)  [] Other:      GOALS  Patient stated goal: \"be able to get in and out of the tractor\"  [] Progressing: [] Met: [x] Not Met: [] Adjusted    Therapist goals for Patient:   Short Term Goals: To be achieved in: 2 weeks  1. Independent in HEP and progression per patient tolerance, in order to prevent re-injury. [] Progressing: [] Met: [x] Not Met: [] Adjusted  2. Patient will have a decrease in pain to facilitate improvement in movement, function, and ADLs as indicated by Functional Deficits. [] Progressing: [] Met: [x] Not Met: [] Adjusted    Long Term Goals: To be achieved in: 8 weeks  1. Disability index score of 10% or less for the LEFS to assist with reaching prior level of function. [] Progressing: [] Met: [x] Not Met: [] Adjusted  2. Patient will demonstrate increased AROM to full LE baljeet to allow for proper joint functioning as indicated by patients Functional Deficits. [] Progressing: [] Met: [x] Not Met: [] Adjusted  3. Patient will demonstrate an increase in Strength to good proximal hip strength and control, within 5lb HHD in LE to allow for proper functional mobility as indicated by patients Functional Deficits. [] Progressing: [] Met: [x] Not Met: [] Adjusted  4. Patient will return to daily functional activities without increased symptoms or restriction.    [] Progressing: [] Met: [x] Not Met: [] Adjusted  5. Patient will be able to stand from a standard height chair without increased symptoms or restrictions. [] Progressing: [] Met: [x] Not Met: [] Adjusted  5. BY DISCHARGE: Pt will be able to get in and out of the tractor at the end of the day without assistance or restrictions. [] Progressing: [] Met: [x] Not Met: [] Adjusted     ASSESSMENT:  Back pain felt better w/ flexion based exercise at beginning of session. Pt is challenged w/ balance likely increased due to history of co morbidities. Pt does have difficult time w/ anterior weight shift 2/2 neuropathy. Requires cues for eccentric control and preventing hyperextension through knees. Has slight increase in difficulty w/ LLE single leg loading 2/2 history of stroke. Increased time for task completion and rest breaks. Continue to progress functional strength and balance to improve independence and decrease falls risk. Treatment/Activity Tolerance:  [x] Patient tolerated treatment well [] Patient limited by fatique  [] Patient limited by pain  [] Patient limited by other medical complications  [] Other:     Overall Progression Towards Functional goals/ Treatment Progress Update:  [x] Patient is progressing as expected towards functional goals listed. [] Progression is slowed due to complexities/Impairments listed. [] Progression has been slowed due to co-morbidities.   [] Plan just implemented, too soon to assess goals progression <30days   [] Goals require adjustment due to lack of progress  [] Patient is not progressing as expected and requires additional follow up with physician  [] Other    Prognosis for POC: [x] Good [] Fair  [] Poor    Patient requires continued skilled intervention: [x] Yes  [] No        PLAN: 1-2x/week x8 weeks  [x] Continue per plan of care [] Alter current plan (see comments)  [] Plan of care initiated [] Hold pending MD visit [] Discharge    Electronically signed by: Mariluz Montejo, PT    Note: If patient does not return for scheduled/recommended follow up visits, this note will serve as a discharge from care along with the most recent update on progress.

## 2023-03-07 ENCOUNTER — HOSPITAL ENCOUNTER (OUTPATIENT)
Dept: PHYSICAL THERAPY | Age: 73
Setting detail: THERAPIES SERIES
Discharge: HOME | End: 2023-03-07
Payer: MEDICARE

## 2023-03-07 PROCEDURE — 97110 THERAPEUTIC EXERCISES: CPT

## 2023-03-07 NOTE — FLOWSHEET NOTE
[place] [address] [facility] [address]  Phone: [phone] Fax: [phone]              Physical Therapy Treatment Note/ Progress Report:       Date:  2023    Patient Name:  Guillermina Grayson    :  1950  MRN: [id number]  Restrictions/Precautions:    Medical/Treatment Diagnosis Information:  Diagnosis: M62.81 muscle weakness, generalized  Treatment Diagnosis: E71.31  Insurance/Certification information:  PT Insurance Information: Medicare; [place] [employer] [place]; ded met; [place] visits  Physician Information:  Tai [facility] signed (Y/N):     Date of Patient follow up with Physician:      Progress Report: []  Yes  [x]  No     Date Range for reporting period:  Beginnin2023  Endin days or 25 visits    Progress report due (10 Rx/or 30 days whichever is less):     Recertification due (POC duration/ or 90 days whichever is less): 3/11/2023    Visit # Insurance Allowable Auth Needed   22 [place] []Yes    [x]No     Latex Allergy:  [x]NO      []YES  Preferred Language for Healthcare:   [x]English       []other:    Functional Scale: LEFS:   Date assessed: 2023    Pain level:  1/10 low back     SUBJECTIVE:  Pt states that his back is doing well today. Pt notes that he still has difficulty walking backwards and has to use his hands when walking down the saunders when he practices going backwards.        OBJECTIVE:   Observation:   Test measurements:      2023  ROM LEFT RIGHT   Lumbar flex FT to ankles; *seated   Lumbar ext 5 deg *seated   Lumbar rotation 70% 75%           HIP Flex 109        RESTRICTIONS/PRECAUTIONS: hx of stroke- affected L side; L side vision limited; increased falls risk    Exercises/Interventions:     Therapeutic Ex (84092)   Min: 40 Sets/sec Reps Notes/CUES   Retro Stepper/BIKE 5'  Resistance 10 - increase as able   LTR 1 10    Bridge Cues for core and glute engagement   LAQ  2 15 5lb   Hip rotations/abductions at EOB 2 15 5lb   Step ups 2 10 8in with 2UE assist at wall   Standing marches 2 15 5lb   Standing hip abd 2 15 5lb    Standing Hip ext with forearms on wall 2 15  Airex under forearms, 5lb   Standing hamstring curls 2 15 5lb B   Seated rows Silver looped band   Supine LTR 1 10 For back mobility   Leg Press DL 2 15 90#   Cybex hamstring curl 2 15 45# DL   Cybex leg ext  2 15 35# DL   SC fwd/bwd walks 10 CGA, 1 blue SC around waist   Glute side walks 3#   Seated fwd ball rolls 1 10    Supine fig 4 hip stretch 10sec 5 bilat   Seated EOB heel slides up shin Pt seated EOB leaning back with hands on table    Step ups/ecc step down 2 10 6inch, 2 sets per leg   Swissball wall rolls- in SLS- hip drive      Quad hip ext/wall-ball rolls      TB fwd seated ball rolls    TB lat seated ball rolls    Manual Intervention (60924)  Min:      Knee mobs/PROM      Tib/Fem Mobs      Patella Mobs      Ankle mobs                  NMR re-education (16553)  Min:    CUES NEEDED   Rhomberg balance - airex Eyes open at half wall; mod difficulty   Tandem stance Level ground @ 1/2 wall   ST lateral walks 2 20' Lat; medium ST in hands   ST walks 4 20 Medium ST down line- fwd only   NBOS on Airex+ball reaches to wall 2 10 Yellow plyoball, SBA   Tandem stance on Airex 30sec 4 At wall for assist   Airex marches 2 10 At wall, fingertip support   Toe touches on box  2 10 12in box   Step pattern react 1 10    Stepping pattern  1 10 3 way   NBOS on Airex with cross-body reaches 2 10 SBA   Biodex Balance machine  L7, 2 min each: random control, maze control, limits of stability   Therapeutic Activity (65711)  Min: 4            KB box tap Green KB   Standing marches  2 10 4lb on ankles   Sit to stand 2 10 Chair + 2 airex   Step up 2 10 [place]; half wall, 8in box   Mini squats at wall 5sec 10 BUE support from half wall, focused on holds       Therapeutic Exercise and NMR EXR  [x] (67076) Provided verbal/tactile cueing for  activities related to strengthening, flexibility, endurance, ROM for improvements in LE, proximal hip, and core control with self care, mobility, lifting, ambulation. [x] (45131) Provided verbal/tactile cueing for activities related to improving balance, coordination, kinesthetic sense, posture, motor skill, proprioception  to assist with LE, proximal hip, and core control in self care, mobility, lifting, ambulation and eccentric single leg control.      NMR and Therapeutic Activities:    [x] (96614 or 20212) Provided verbal/tactile cueing for activities related to improving balance, coordination, kinesthetic sense, posture, motor skill, proprioception and motor activation to allow for proper function of core, proximal hip and LE with self care and ADLs and functional mobility.   [] (53728) Gait Re-education- Provided training and instruction to the patient for proper LE, core and proximal hip recruitment and positioning and eccentric body weight control with ambulation re-education including up and down stairs     Home Exercise Program:    [x] (16936) Reviewed/Progressed HEP activities related to strengthening, flexibility, endurance, ROM of core, proximal hip and LE for functional self-care, mobility, lifting and ambulation/stair navigation   [] (92184)Reviewed/Progressed HEP activities related to improving balance, coordination, kinesthetic sense, posture, motor skill, proprioception of core, proximal hip and LE for self care, mobility, lifting, and ambulation/stair navigation      Manual Treatments:  PROM / STM / Oscillations-Mobs:  G-I, II, III, IV (PA's, Inf., Post.)  [] (42001) Provided manual therapy to mobilize LE, proximal hip and/or LS spine soft tissue/joints for the purpose of modulating pain, promoting relaxation,  increasing ROM, reducing/eliminating soft tissue swelling/inflammation/restriction, improving soft tissue extensibility and allowing for proper ROM for normal function with self care, mobility, lifting and ambulation. Modalities:     [] GAME READY (VASO)- for significant edema, swelling, pain control. Charges:  Timed Code Treatment Minutes: 44   Total Treatment Minutes: 44      [] EVAL (LOW) 00427 (typically 20 minutes face-to-face)  [] EVAL (MOD) 73174 (typically 30 minutes face-to-face)  [] EVAL (HIGH) 27948 (typically 45 minutes face-to-face)  [] RE-EVAL     [x] WS(56960) x  3  [] DRY NEEDLE 1 OR 2 MUSCLES  [] NMR (46548) x     [] DRY NEEDLE 3+ MUSCLES  [] Manual (10672) x       [] TA (75864) x     [] Mech Traction (37466)  [] ES(attended) (21536)     [] ES (un) (53474):   [] VASO (59679)  [] Other:      GOALS  Patient stated goal: \"be able to get in and out of the tractor\"  [] Progressing: [] Met: [x] Not Met: [] Adjusted    Therapist goals for Patient:   Short Term Goals: To be achieved in: 2 weeks  1. Independent in HEP and progression per patient tolerance, in order to prevent re-injury. [] Progressing: [] Met: [x] Not Met: [] Adjusted  2. Patient will have a decrease in pain to facilitate improvement in movement, function, and ADLs as indicated by Functional Deficits. [] Progressing: [] Met: [x] Not Met: [] Adjusted    Long Term Goals: To be achieved in: 8 weeks  1. Disability index score of 10% or less for the LEFS to assist with reaching prior level of function. [] Progressing: [] Met: [x] Not Met: [] Adjusted  2. Patient will demonstrate increased AROM to full LE baljeet to allow for proper joint functioning as indicated by patients Functional Deficits. [] Progressing: [] Met: [x] Not Met: [] Adjusted  3. Patient will demonstrate an increase in Strength to good proximal hip strength and control, within 5lb HHD in LE to allow for proper functional mobility as indicated by patients Functional Deficits. [] Progressing: [] Met: [x] Not Met: [] Adjusted  4. Patient will return to daily functional activities without increased symptoms or restriction.    [] Progressing: [] Met: [x] Not Met: [] Adjusted  5. Patient will be able to stand from a standard height chair without increased symptoms or restrictions. [] Progressing: [] Met: [x] Not Met: [] Adjusted  5. BY DISCHARGE: Pt will be able to get in and out of the tractor at the end of the day without assistance or restrictions. [] Progressing: [] Met: [x] Not Met: [] Adjusted     ASSESSMENT:  Good tolerance to exercise today without complaints of back pain. Pt is challenged w/ balance likely increased due to history of co morbidities. Pt does have difficult time w/ anterior weight shift 2/2 neuropathy. Requires cues for eccentric control and preventing hyperextension through knees. Has slight increase in difficulty w/ LLE single leg loading 2/2 history of stroke. Increased time for task completion and rest breaks. Continue to progress functional strength and balance to improve independence and decrease falls risk. Treatment/Activity Tolerance:  [x] Patient tolerated treatment well [] Patient limited by fatique  [] Patient limited by pain  [] Patient limited by other medical complications  [] Other:     Overall Progression Towards Functional goals/ Treatment Progress Update:  [x] Patient is progressing as expected towards functional goals listed. [] Progression is slowed due to complexities/Impairments listed. [] Progression has been slowed due to co-morbidities.   [] Plan just implemented, too soon to assess goals progression <30days   [] Goals require adjustment due to lack of progress  [] Patient is not progressing as expected and requires additional follow up with physician  [] Other    Prognosis for POC: [x] Good [] Fair  [] Poor    Patient requires continued skilled intervention: [x] Yes  [] No        PLAN: 1-2x/week x8 weeks  [x] Continue per plan of care [] Alter current plan (see comments)  [] Plan of care initiated [] Hold pending MD visit [] Discharge    Electronically signed by: Valerie Bailon PTA    Note: If patient does not return for scheduled/recommended follow up visits, this note will serve as a discharge from care along with the most recent update on progress.

## 2023-03-09 ENCOUNTER — HOSPITAL ENCOUNTER (OUTPATIENT)
Dept: PHYSICAL THERAPY | Age: 73
Setting detail: THERAPIES SERIES
Discharge: HOME | End: 2023-03-09
Payer: MEDICARE

## 2023-03-09 PROCEDURE — 97110 THERAPEUTIC EXERCISES: CPT

## 2023-03-09 PROCEDURE — 97530 THERAPEUTIC ACTIVITIES: CPT

## 2023-03-09 NOTE — FLOWSHEET NOTE
[place] [address] [facility]  Phone: [phone] Fax: [phone]              Physical Therapy Treatment Note/ Progress Report:       Date:  2023    Patient Name:  Wilman Badillo    :  1950  MRN: [id number]  Restrictions/Precautions:    Medical/Treatment Diagnosis Information:  Diagnosis: M62.81 muscle weakness, generalized  Treatment Diagnosis: T26.66  Insurance/Certification information:  PT Insurance Information: Medicare; [place]; ded met; [place] visits  Physician Information:  Tai [facility] signed (Y/N):     Date of Patient follow up with Physician:      Progress Report: []  Yes  [x]  No     Date Range for reporting period:  Beginnin2023  Endin days or 25 visits    Progress report due (10 Rx/or 30 days whichever is less):     Recertification due (POC duration/ or 90 days whichever is less): 3/11/2023    Visit # Insurance Allowable Auth Needed   23 [place] []Yes    [x]No     Latex Allergy:  [x]NO      []YES  Preferred Language for Healthcare:   [x]English       []other:    Functional Scale: LEFS:   Date assessed: 2023    Pain level:  1/10 low back     SUBJECTIVE:  Pt states that his back is doing well today. \"I do have a touch of bronchitis. I used my nebulizer this morning. Moving a little slower today. \"    OBJECTIVE: Pt used personal inhaler once during session today.    Observation:   Test measurements:      2023  ROM LEFT RIGHT   Lumbar flex FT to ankles; *seated   Lumbar ext 5 deg *seated   Lumbar rotation 70% 75%           HIP Flex 109        RESTRICTIONS/PRECAUTIONS: hx of stroke- affected L side; L side vision limited; increased falls risk    Exercises/Interventions:     Therapeutic Ex (21358)   Min: 28 min Sets/sec Reps Notes/CUES   Retro Stepper/BIKE 5'  Resistance 10 - increase as able   LTR 1 10    Bridge Cues for core and glute engagement LAQ  2 15 5lb   Hip rotations/abductions at EOB 2 15 5lb   Step ups 2 10 8in with 2UE assist at wall   Standing marches 2 15 5lb   Standing hip abd 2 15 5lb    Standing Hip ext with forearms on wall 2 15  Airex under forearms, 5lb   Standing hamstring curls 2 15 5lb B   Seated rows Silver looped band   Supine LTR 1 10 For back mobility   Leg Press DL 2 15 90#   Cybex hamstring curl 2 15 45# DL   Cybex leg ext  2 15 35# DL   SC fwd/bwd walks 10 CGA, 1 blue SC around waist   Glute side walks 3#   Seated fwd ball rolls 1 10    Supine fig 4 hip stretch 10sec 5 bilat   Seated EOB heel slides up shin Pt seated EOB leaning back with hands on table    Step ups/ecc step down 2 10 6inch, 2 sets per leg   Swissball wall rolls- in SLS- hip drive      Quad hip ext/wall-ball rolls      TB fwd seated ball rolls    TB lat seated ball rolls    Manual Intervention (49318)  Min:      Knee mobs/PROM      Tib/Fem Mobs      Patella Mobs      Ankle mobs                  NMR re-education (30030)  Min: 4   CUES NEEDED   Rhomberg balance - airex Eyes open at half wall; mod difficulty   Tandem stance 20sec 3 Airex @ 1/2 wall   ST lateral walks 2 20' Lat; medium ST in hands   ST walks 4 20 Medium ST down line- fwd only   NBOS on Airex+ball reaches to wall 2 10 Yellow plyoball, SBA   Tandem stance on Airex 30sec 4 At wall for assist   Airex marches 2 10 At wall, fingertip support   Toe touches on box  2 10 12in box   Step pattern react 1 10    Stepping pattern  1 10 3 way   NBOS on Airex with cross-body reaches 2 10 SBA   Biodex Balance machine  L7, 2 min each: random control, maze control, limits of stability   Therapeutic Activity (00108)  Min: 8            KB box tap Green KB   Standing marches  2 10 5lb on ankles   Sit to stand 2 10 Chair + 2 airex   Step up 2 10 [place]; half wall, 8in box   Mini squats at wall 5sec 10 BUE support from half wall, focused on holds       Therapeutic Exercise and NMR EXR  [x] (27725) Provided verbal/tactile cueing for activities related to strengthening, flexibility, endurance, ROM for improvements in LE, proximal hip, and core control with self care, mobility, lifting, ambulation. [x] (18223) Provided verbal/tactile cueing for activities related to improving balance, coordination, kinesthetic sense, posture, motor skill, proprioception  to assist with LE, proximal hip, and core control in self care, mobility, lifting, ambulation and eccentric single leg control.      NMR and Therapeutic Activities:    [x] (98047 or 98908) Provided verbal/tactile cueing for activities related to improving balance, coordination, kinesthetic sense, posture, motor skill, proprioception and motor activation to allow for proper function of core, proximal hip and LE with self care and ADLs and functional mobility.   [] (28233) Gait Re-education- Provided training and instruction to the patient for proper LE, core and proximal hip recruitment and positioning and eccentric body weight control with ambulation re-education including up and down stairs     Home Exercise Program:    [x] (82263) Reviewed/Progressed HEP activities related to strengthening, flexibility, endurance, ROM of core, proximal hip and LE for functional self-care, mobility, lifting and ambulation/stair navigation   [] (49703)Reviewed/Progressed HEP activities related to improving balance, coordination, kinesthetic sense, posture, motor skill, proprioception of core, proximal hip and LE for self care, mobility, lifting, and ambulation/stair navigation      Manual Treatments:  PROM / STM / Oscillations-Mobs:  G-I, II, III, IV (PA's, Inf., Post.)  [] (32062) Provided manual therapy to mobilize LE, proximal hip and/or LS spine soft tissue/joints for the purpose of modulating pain, promoting relaxation,  increasing ROM, reducing/eliminating soft tissue swelling/inflammation/restriction, improving soft tissue extensibility and allowing for proper ROM for normal function with self care, mobility, lifting and ambulation. Modalities:     [] GAME READY (VASO)- for significant edema, swelling, pain control. Charges:  Timed Code Treatment Minutes: 40   Total Treatment Minutes: 44      [] EVAL (LOW) 25235 (typically 20 minutes face-to-face)  [] EVAL (MOD) 70244 (typically 30 minutes face-to-face)  [] EVAL (HIGH) 22570 (typically 45 minutes face-to-face)  [] RE-EVAL     [x] GG(34859) x  2  [] DRY NEEDLE 1 OR 2 MUSCLES  [] NMR (91269) x     [] DRY NEEDLE 3+ MUSCLES  [] Manual (09955) x       [x] TA (88765) x 1     [] Mech Traction (84384)  [] ES(attended) (88050)     [] ES (un) (33986):   [] VASO (57280)  [] Other:      GOALS  Patient stated goal: \"be able to get in and out of the tractor\"  [] Progressing: [] Met: [x] Not Met: [] Adjusted    Therapist goals for Patient:   Short Term Goals: To be achieved in: 2 weeks  1. Independent in HEP and progression per patient tolerance, in order to prevent re-injury. [] Progressing: [] Met: [x] Not Met: [] Adjusted  2. Patient will have a decrease in pain to facilitate improvement in movement, function, and ADLs as indicated by Functional Deficits. [] Progressing: [] Met: [x] Not Met: [] Adjusted    Long Term Goals: To be achieved in: 8 weeks  1. Disability index score of 10% or less for the LEFS to assist with reaching prior level of function. [] Progressing: [] Met: [x] Not Met: [] Adjusted  2. Patient will demonstrate increased AROM to full LE baljeet to allow for proper joint functioning as indicated by patients Functional Deficits. [] Progressing: [] Met: [x] Not Met: [] Adjusted  3. Patient will demonstrate an increase in Strength to good proximal hip strength and control, within 5lb HHD in LE to allow for proper functional mobility as indicated by patients Functional Deficits. [] Progressing: [] Met: [x] Not Met: [] Adjusted  4. Patient will return to daily functional activities without increased symptoms or restriction.    [] Progressing: [] Met: [x] Not Met: [] Adjusted  5. Patient will be able to stand from a standard height chair without increased symptoms or restrictions. [] Progressing: [] Met: [x] Not Met: [] Adjusted  5. BY DISCHARGE: Pt will be able to get in and out of the tractor at the end of the day without assistance or restrictions. [] Progressing: [] Met: [x] Not Met: [] Adjusted     ASSESSMENT:  Pt required more rest breaks today due to shortness of breath. Decreased tolerance to sit to stand activity today. Pt is challenged w/ balance likely increased due to history of co morbidities. Pt does have difficult time w/ anterior weight shift 2/2 neuropathy. Requires cues for eccentric control and preventing hyperextension through knees. Has slight increase in difficulty w/ LLE single leg loading 2/2 history of stroke. Increased time for task completion and rest breaks. Continue to progress functional strength and balance to improve independence and decrease falls risk. Treatment/Activity Tolerance:  [x] Patient tolerated treatment well [] Patient limited by fatique  [] Patient limited by pain  [] Patient limited by other medical complications  [] Other:     Overall Progression Towards Functional goals/ Treatment Progress Update:  [x] Patient is progressing as expected towards functional goals listed. [] Progression is slowed due to complexities/Impairments listed. [] Progression has been slowed due to co-morbidities.   [] Plan just implemented, too soon to assess goals progression <30days   [] Goals require adjustment due to lack of progress  [] Patient is not progressing as expected and requires additional follow up with physician  [] Other    Prognosis for POC: [x] Good [] Fair  [] Poor    Patient requires continued skilled intervention: [x] Yes  [] No        PLAN: 1-2x/week x8 weeks  [x] Continue per plan of care [] Alter current plan (see comments)  [] Plan of care initiated [] Hold pending MD visit [] Discharge    Electronically signed by: Dedrick Nayak PTA    Note: If patient does not return for scheduled/recommended follow up visits, this note will serve as a discharge from care along with the most recent update on progress.

## 2023-03-14 ENCOUNTER — HOSPITAL ENCOUNTER (OUTPATIENT)
Dept: PHYSICAL THERAPY | Age: 73
Setting detail: THERAPIES SERIES
Discharge: HOME | End: 2023-03-14
Payer: MEDICARE

## 2023-03-14 PROCEDURE — 97530 THERAPEUTIC ACTIVITIES: CPT

## 2023-03-14 PROCEDURE — 97110 THERAPEUTIC EXERCISES: CPT

## 2023-03-14 NOTE — FLOWSHEET NOTE
[place] [address] [facility] [address]  Phone: [phone] Fax: [phone]              Physical Therapy Treatment Note/ Progress Report:       Date:  2023    Patient Name:  Cailin Mello    :  1950  MRN: [id number]  Restrictions/Precautions:    Medical/Treatment Diagnosis Information:  Diagnosis: M62.81 muscle weakness, generalized  Treatment Diagnosis: H90.33  Insurance/Certification information:  PT Insurance Information: Medicare; [place] [employer] [place]; ded met; [place] visits  Physician Information:  Tai  [facility] signed (Y/N):     Date of Patient follow up with Physician:      Progress Report: []  Yes  [x]  No     Date Range for reporting period:  Beginnin2023  Endin days or 25 visits    Progress report due (10 Rx/or 30 days whichever is less):     Recertification due (POC duration/ or 90 days whichever is less): 3/11/2023    Visit # Insurance Allowable Auth Needed   24 [place] []Yes    [x]No     Latex Allergy:  [x]NO      []YES  Preferred Language for Healthcare:   [x]English       []other:    Functional Scale: LEFS:   Date assessed: 2023    Pain level:  1/10 low back     SUBJECTIVE:  Pt states that he went to the doctor and got a shot for his lungs, which seems to be helping. Feeling ok overall today; Pt slept in a little later than usual so he did not do his morning exercises.        OBJECTIVE:   Observation:   Test measurements:      2023  ROM LEFT RIGHT   Lumbar flex FT to ankles; *seated   Lumbar ext 5 deg *seated   Lumbar rotation 70% 75%           HIP Flex 109        RESTRICTIONS/PRECAUTIONS: hx of stroke- affected L side; L side vision limited; increased falls risk    Exercises/Interventions:     Therapeutic Ex (18125)   Min: 28 min Sets/sec Reps Notes/CUES   Retro Stepper/BIKE 5'  Resistance 10 - increase as able   LTR 1 10    Bridge Cues for core and glute engagement LAQ  2 15 5lb   Hip rotations/abductions at EOB 2 15 5lb   Step ups 2 10 8in with 2UE assist at wall   Standing marches 2 15 5lb   Standing hip abd 2 15 5lb    Standing Hip ext with forearms on wall 2 15  Airex under forearms, 5lb   Standing hamstring curls 2 15 5lb B   Seated rows Silver looped band   Supine LTR 1 10 For back mobility   Leg Press DL 2 15 90#   Cybex hamstring curl 2 15 45# DL   Cybex leg ext  2 15 35# DL   SC fwd/bwd walks 10 CGA, 1 blue SC around waist   Glute side walks 3#   Seated fwd ball rolls 1 10    Supine fig 4 hip stretch 10sec 5 bilat   Seated EOB heel slides up shin Pt seated EOB leaning back with hands on table    Step ups/ecc step down 2 10 6inch, 2 sets per leg   Swissball wall rolls- in SLS- hip drive      Quad hip ext/wall-ball rolls      TB fwd seated ball rolls    TB lat seated ball rolls    Manual Intervention (64606)  Min:      Knee mobs/PROM      Tib/Fem Mobs      Patella Mobs      Ankle mobs                  NMR re-education (37051)  Min: 4   CUES NEEDED   Rhomberg balance - airex Eyes open at half wall; mod difficulty   Tandem stance 20sec 3 Airex @ 1/2 wall with rotations   ST lateral walks 2 20' Lat; medium ST in hands   ST walks 4 20 Medium ST down line- fwd only   NBOS on Airex+ball reaches to wall 2 10 Yellow plyoball, SBA   Tandem stance on Airex 30sec 4 At wall for assist   Airex marches 2 10 At wall, fingertip support   Toe touches on box  2 10 12in box   Step pattern react 1 10    Stepping pattern  1 10 3 way   NBOS on Airex with cross-body reaches 2 10 SBA   Biodex Balance machine  L7, 2 min each: random control, maze control, limits of stability   Therapeutic Activity (74002)  Min: 12 min            KB box tap Green KB   Standing marches  2 10 5lb on ankles   Sit to stand 2 10 Chair + 2 airex   Step up 2 10 Clemente; half wall, 6in box   Lateral step up and over 2 10 Bilat, 6in box @ 1/2 wall   Mini squats at wall 5sec 10 BUE support from half wall, focused on holds Therapeutic Exercise and NMR EXR  [x] (89621) Provided verbal/tactile cueing for activities related to strengthening, flexibility, endurance, ROM for improvements in LE, proximal hip, and core control with self care, mobility, lifting, ambulation. [x] (27439) Provided verbal/tactile cueing for activities related to improving balance, coordination, kinesthetic sense, posture, motor skill, proprioception  to assist with LE, proximal hip, and core control in self care, mobility, lifting, ambulation and eccentric single leg control.      NMR and Therapeutic Activities:    [x] (71501 or 13940) Provided verbal/tactile cueing for activities related to improving balance, coordination, kinesthetic sense, posture, motor skill, proprioception and motor activation to allow for proper function of core, proximal hip and LE with self care and ADLs and functional mobility.   [] (03647) Gait Re-education- Provided training and instruction to the patient for proper LE, core and proximal hip recruitment and positioning and eccentric body weight control with ambulation re-education including up and down stairs     Home Exercise Program:    [x] (61019) Reviewed/Progressed HEP activities related to strengthening, flexibility, endurance, ROM of core, proximal hip and LE for functional self-care, mobility, lifting and ambulation/stair navigation   [] (13350)Reviewed/Progressed HEP activities related to improving balance, coordination, kinesthetic sense, posture, motor skill, proprioception of core, proximal hip and LE for self care, mobility, lifting, and ambulation/stair navigation      Manual Treatments:  PROM / STM / Oscillations-Mobs:  G-I, II, III, IV (PA's, Inf., Post.)  [] (66325) Provided manual therapy to mobilize LE, proximal hip and/or LS spine soft tissue/joints for the purpose of modulating pain, promoting relaxation,  increasing ROM, reducing/eliminating soft tissue swelling/inflammation/restriction, improving soft tissue extensibility and allowing for proper ROM for normal function with self care, mobility, lifting and ambulation. Modalities:     [] GAME READY (VASO)- for significant edema, swelling, pain control. Charges:  Timed Code Treatment Minutes: 44   Total Treatment Minutes: 44      [] EVAL (LOW) 68534 (typically 20 minutes face-to-face)  [] EVAL (MOD) 35939 (typically 30 minutes face-to-face)  [] EVAL (HIGH) 13350 (typically 45 minutes face-to-face)  [] RE-EVAL     [x] WI(06671) x  2  [] DRY NEEDLE 1 OR 2 MUSCLES  [] NMR (47002) x     [] DRY NEEDLE 3+ MUSCLES  [] Manual (24352) x       [x] TA (60916) x 1     [] Mech Traction (78481)  [] ES(attended) (88424)     [] ES (un) (63909):   [] VASO (48787)  [] Other:      GOALS  Patient stated goal: \"be able to get in and out of the tractor\"  [] Progressing: [] Met: [x] Not Met: [] Adjusted    Therapist goals for Patient:   Short Term Goals: To be achieved in: 2 weeks  1. Independent in HEP and progression per patient tolerance, in order to prevent re-injury. [] Progressing: [] Met: [x] Not Met: [] Adjusted  2. Patient will have a decrease in pain to facilitate improvement in movement, function, and ADLs as indicated by Functional Deficits. [] Progressing: [] Met: [x] Not Met: [] Adjusted    Long Term Goals: To be achieved in: 8 weeks  1. Disability index score of 10% or less for the LEFS to assist with reaching prior level of function. [] Progressing: [] Met: [x] Not Met: [] Adjusted  2. Patient will demonstrate increased AROM to full LE baljeet to allow for proper joint functioning as indicated by patients Functional Deficits. [] Progressing: [] Met: [x] Not Met: [] Adjusted  3. Patient will demonstrate an increase in Strength to good proximal hip strength and control, within 5lb HHD in LE to allow for proper functional mobility as indicated by patients Functional Deficits. [] Progressing: [] Met: [x] Not Met: [] Adjusted  4.  Patient will return to daily functional activities without increased symptoms or restriction. [] Progressing: [] Met: [x] Not Met: [] Adjusted  5. Patient will be able to stand from a standard height chair without increased symptoms or restrictions. [] Progressing: [] Met: [x] Not Met: [] Adjusted  5. BY DISCHARGE: Pt will be able to get in and out of the tractor at the end of the day without assistance or restrictions. [] Progressing: [] Met: [x] Not Met: [] Adjusted     ASSESSMENT:  Good session overall. Decreased tolerance to sit to stand activity today. Pt is challenged w/ balance likely increased due to history of co morbidities. Pt does have difficult time w/ anterior weight shift 2/2 neuropathy. Requires cues for eccentric control and preventing hyperextension through knees. Has slight increase in difficulty w/ LLE single leg loading 2/2 history of stroke. Increased time for task completion and rest breaks. Continue to progress functional strength and balance to improve independence and decrease falls risk. Treatment/Activity Tolerance:  [x] Patient tolerated treatment well [] Patient limited by fatique  [] Patient limited by pain  [] Patient limited by other medical complications  [] Other:     Overall Progression Towards Functional goals/ Treatment Progress Update:  [x] Patient is progressing as expected towards functional goals listed. [] Progression is slowed due to complexities/Impairments listed. [] Progression has been slowed due to co-morbidities.   [] Plan just implemented, too soon to assess goals progression <30days   [] Goals require adjustment due to lack of progress  [] Patient is not progressing as expected and requires additional follow up with physician  [] Other    Prognosis for POC: [x] Good [] Fair  [] Poor    Patient requires continued skilled intervention: [x] Yes  [] No        PLAN: 1-2x/week x8 weeks  [x] Continue per plan of care [] Alter current plan (see comments)  [] Plan of care initiated [] Hold pending MD visit [] Discharge    Electronically signed by: Reyes Busby PTA    Note: If patient does not return for scheduled/recommended follow up visits, this note will serve as a discharge from care along with the most recent update on progress.

## 2023-03-16 ENCOUNTER — HOSPITAL ENCOUNTER (OUTPATIENT)
Dept: PHYSICAL THERAPY | Age: 73
Setting detail: THERAPIES SERIES
Discharge: HOME | End: 2023-03-16
Payer: MEDICARE

## 2023-03-16 PROCEDURE — 97530 THERAPEUTIC ACTIVITIES: CPT

## 2023-03-16 PROCEDURE — 97110 THERAPEUTIC EXERCISES: CPT

## 2023-03-16 NOTE — PLAN OF CARE
[place] [address] [address]  Phone: [phone] Fax: [phone]            Physical Therapy Re-Certification Plan of [facility]      Dear  Dr. Camila Rene,    We had the pleasure of treating the following patient for physical therapy services at [address] [place] [address]. A summary of our findings can be found in the updated assessment below. This includes our plan of care. If you have any questions or concerns regarding these findings, please do not hesitate to contact me at the office phone number checked above.   Thank you for the referral.     Physician Signature:________________________________Date:__________________  By signing above (or electronic signature), therapists plan is approved by physician    Date Range Of Visits: 2022-3/16/2023  Total Visits to Date: 22  Overall Response to Treatment:   []Patient is responding well to treatment and improvement is noted with regards  to goals   []Patient should continue to improve in reasonable time if they continue HEP   []Patient has plateaued and is no longer responding to skilled PT intervention    []Patient is getting worse and would benefit from return to referring MD   []Patient unable to adhere to initial POC   [x]Other: has made progress but continues to struggle w/ functional strength through deeper ranges and balance, expected to have continued limitations due to history of stroke and chronic deficits      Physical Therapy Treatment Note/ Progress Report:       Date:  2023    Patient Name:  Aba Rios    :  1950  MRN: [id number]  Restrictions/Precautions:    Medical/Treatment Diagnosis Information:  Diagnosis: M62.81 muscle weakness, generalized  Treatment Diagnosis: L77.70  Insurance/Certification information:  PT Insurance Information: Medicare; [employer] [place]; ded met; [place] visits  Physician Information:  Manjula Lopez Michaelfurt of care signed (Y/N):     Date of Patient follow up with Physician:      Progress Report: [x]  Yes  []  No     Date Range for reporting period:  Beginning: 3/16/2023  Endin days or 35 visits    Progress report due (10 Rx/or 30 days whichever is less):     Recertification due (POC duration/ or 90 days whichever is less): 2023    Visit # Insurance Allowable Auth Needed   25 [place] []Yes    [x]No     Latex Allergy:  [x]NO      []YES  Preferred Language for Healthcare:   [x]English       []other:    Functional Scale: LEFS: 43/80  Date assessed: 3/16/2023    Pain level:  1/10 low back     SUBJECTIVE:  Pt states that he has been working on his standing up from a chair at home. \"I was able to do it from my chair at home that is 19 inches without shoes on.        OBJECTIVE:   Observation:   Test measurements:      3/16/2023  30 sec STS: 7 reps from 22.5 inch table height (78 degrees of knee flexion upon sitting)    RESTRICTIONS/PRECAUTIONS: hx of stroke- affected L side; L side vision limited; increased falls risk    Exercises/Interventions:     Therapeutic Ex (14482)   Min: 35 min Sets/sec Reps Notes/CUES   Retro Stepper/BIKE 5'  Resistance 10 - increase as able   LTR 1 10    Bridge 1 10 Cues for core and glute engagement   LAQ  2 15 5lb   Hip rotations/abductions at EOB 2 15 5lb   Step ups 2 10 8in with 2UE assist at wall   Standing marches 2 15 5lb   Standing hip abd 2 15 5lb    EOB hip extension 2 10 5lb   Lateral stepping at table 1 15 5lb    Standing hamstring curls 2 15 5lb B   Seated rows Silver looped band   Supine LTR 1 10 For back mobility   Leg Press DL 2 15 90#   Cybex hamstring curl 2 15 45# DL   Cybex leg ext  2 15 35# DL   SC fwd/bwd walks 10 CGA, 1 blue SC around waist   Glute side walks 3#   Seated fwd ball rolls 1 10    Supine fig 4 hip stretch 10sec 5 bilat   Seated EOB heel slides up shin Pt seated EOB leaning back with hands on table    Step ups/ecc step down 2 10 6inch, 2 sets per leg   Swissball wall rolls- in SLS- hip drive      Quad hip ext/wall-ball rolls      TB fwd seated ball rolls    TB lat seated ball rolls    Manual Intervention (19549)  Min:      Knee mobs/PROM      Tib/Fem Mobs      Patella Mobs      Ankle mobs                  NMR re-education (90769)  Min:    CUES NEEDED   Rhomberg balance - airex Eyes open at half wall; mod difficulty   Tandem stance 20sec 3 Airex @ 1/2 wall with rotations   ST lateral walks 2 20' Lat; medium ST in hands   ST walks 4 20 Medium ST down line- fwd only   NBOS on Airex+ball reaches to wall 2 10 Yellow plyoball, SBA   Tandem stance on Airex 30sec 4 At wall for assist   Airex marches 2 10 At wall, fingertip support   Toe touches on box  2 10 12in box   Step pattern react 1 10    Stepping pattern  1 10 3 way   NBOS on Airex with cross-body reaches 2 10 SBA   Biodex Balance machine  L7, 2 min each: random control, maze control, limits of stability   Therapeutic Activity (98521)  Min: 8 min      High step ups to replicate tractor step 3 5 3 smaller sets per leg, biodex balance machine+2in box. 2UE assist   KB box tap Green KB   Standing marches  2 10 5lb on ankles   Sit to stand test 30 sec  7 reps from 22.5 inch seat height   Sit to stand 2 10 Chair + 2 airex   Step up 2 10 Clemente; half wall, 6in box   Lateral step up and over 2 10 Bilat, 6in box @ 1/2 wall   Mini squats at wall 5sec 10 BUE support from half wall, focused on holds       Therapeutic Exercise and NMR EXR  [x] (71851) Provided verbal/tactile cueing for activities related to strengthening, flexibility, endurance, ROM for improvements in LE, proximal hip, and core control with self care, mobility, lifting, ambulation.   [x] (00392) Provided verbal/tactile cueing for activities related to improving balance, coordination, kinesthetic sense, posture, motor skill, proprioception  to assist with LE, proximal hip, and core control in self care, mobility, lifting, ambulation and eccentric single leg control. NMR and Therapeutic Activities:    [x] (23446 or 81933) Provided verbal/tactile cueing for activities related to improving balance, coordination, kinesthetic sense, posture, motor skill, proprioception and motor activation to allow for proper function of core, proximal hip and LE with self care and ADLs and functional mobility.   [] (37348) Gait Re-education- Provided training and instruction to the patient for proper LE, core and proximal hip recruitment and positioning and eccentric body weight control with ambulation re-education including up and down stairs     Home Exercise Program:    [x] (72562) Reviewed/Progressed HEP activities related to strengthening, flexibility, endurance, ROM of core, proximal hip and LE for functional self-care, mobility, lifting and ambulation/stair navigation   [] (84985)Reviewed/Progressed HEP activities related to improving balance, coordination, kinesthetic sense, posture, motor skill, proprioception of core, proximal hip and LE for self care, mobility, lifting, and ambulation/stair navigation      Manual Treatments:  PROM / STM / Oscillations-Mobs:  G-I, II, III, IV (PA's, Inf., Post.)  [] (64663) Provided manual therapy to mobilize LE, proximal hip and/or LS spine soft tissue/joints for the purpose of modulating pain, promoting relaxation,  increasing ROM, reducing/eliminating soft tissue swelling/inflammation/restriction, improving soft tissue extensibility and allowing for proper ROM for normal function with self care, mobility, lifting and ambulation. Modalities:     [] GAME READY (VASO)- for significant edema, swelling, pain control.      Charges:  Timed Code Treatment Minutes: 43   Total Treatment Minutes: 43      [] EVAL (LOW) 92964 (typically 20 minutes face-to-face)  [] EVAL (MOD) 79377 (typically 30 minutes face-to-face)  [] EVAL (HIGH) 70211 (typically 45 minutes face-to-face)  [] RE-EVAL     [x] PY(01820) x  2  [] DRY NEEDLE 1 OR 2 MUSCLES  [] NMR (55811) x     [] DRY NEEDLE 3+ MUSCLES  [] Manual (19813) x       [x] TA (63220) x 1  [] Mech Traction (40516)  [] ES(attended) (64126)     [] ES (un) (00770):   [] VASO (10252)  [] Other:      GOALS  Patient stated goal: \"be able to get in and out of the tractor\"  [] Progressing: [] Met: [x] Not Met: [] Adjusted    Therapist goals for Patient:   Short Term Goals: To be achieved in: 2 weeks  1. Independent in HEP and progression per patient tolerance, in order to prevent re-injury. [] Progressing: [] Met: [x] Not Met: [] Adjusted  2. Patient will have a decrease in pain to facilitate improvement in movement, function, and ADLs as indicated by Functional Deficits. [] Progressing: [] Met: [x] Not Met: [] Adjusted    Long Term Goals: To be achieved in: 8 weeks  1. Disability index score of 10% or less for the LEFS to assist with reaching prior level of function. [] Progressing: [] Met: [x] Not Met: [] Adjusted  2. Patient will demonstrate increased AROM to full LE baljeet to allow for proper joint functioning as indicated by patients Functional Deficits. [] Progressing: [] Met: [x] Not Met: [] Adjusted  3. Patient will demonstrate an increase in Strength to good proximal hip strength and control, within 5lb HHD in LE to allow for proper functional mobility as indicated by patients Functional Deficits. [] Progressing: [] Met: [x] Not Met: [] Adjusted  4. Patient will return to daily functional activities without increased symptoms or restriction. [] Progressing: [] Met: [x] Not Met: [] Adjusted  5. Patient will be able to stand from a standard height chair without increased symptoms or restrictions. [] Progressing: [] Met: [x] Not Met: [] Adjusted  5. BY DISCHARGE: Pt will be able to get in and out of the tractor at the end of the day without assistance or restrictions. [] Progressing: [] Met: [x] Not Met: [] Adjusted     ASSESSMENT:  LE strength improving gradually.   Good challenge with increased step up height today with UE assist from handles. Pt is challenged w/ balance likely increased due to history of co morbidities. Pt does have difficult time w/ anterior weight shift 2/2 neuropathy. Requires cues for eccentric control and preventing hyperextension through knees. Has slight increase in difficulty w/ LLE single leg loading 2/2 history of stroke. Increased time for task completion and rest breaks. Continue to progress functional strength and balance to improve independence and decrease falls risk. Treatment/Activity Tolerance:  [x] Patient tolerated treatment well [] Patient limited by fatique  [] Patient limited by pain  [] Patient limited by other medical complications  [] Other:     Overall Progression Towards Functional goals/ Treatment Progress Update:  [x] Patient is progressing as expected towards functional goals listed. [] Progression is slowed due to complexities/Impairments listed. [] Progression has been slowed due to co-morbidities. [] Plan just implemented, too soon to assess goals progression <30days   [] Goals require adjustment due to lack of progress  [] Patient is not progressing as expected and requires additional follow up with physician  [] Other    Prognosis for POC: [x] Good [] Fair  [] Poor    Patient requires continued skilled intervention: [x] Yes  [] No        PLAN: 1-2x/week x8 weeks  [x] Continue per plan of care [] Alter current plan (see comments)  [] Plan of care initiated [] Hold pending MD visit [] Discharge    Electronically signed by: Aroldo Broderick PT    Note: If patient does not return for scheduled/recommended follow up visits, this note will serve as a discharge from care along with the most recent update on progress.

## 2023-03-21 ENCOUNTER — HOSPITAL ENCOUNTER (OUTPATIENT)
Dept: PHYSICAL THERAPY | Age: 73
Setting detail: THERAPIES SERIES
Discharge: HOME | End: 2023-03-21
Payer: MEDICARE

## 2023-03-21 PROCEDURE — 97530 THERAPEUTIC ACTIVITIES: CPT

## 2023-03-21 PROCEDURE — 97110 THERAPEUTIC EXERCISES: CPT

## 2023-03-21 NOTE — FLOWSHEET NOTE
allowing for proper ROM for normal function with self care, mobility, lifting and ambulation. Modalities:     [] GAME READY (VASO)- for significant edema, swelling, pain control. Charges:  Timed Code Treatment Minutes: 38   Total Treatment Minutes: 40      [] EVAL (LOW) 68113 (typically 20 minutes face-to-face)  [] EVAL (MOD) 25612 (typically 30 minutes face-to-face)  [] EVAL (HIGH) 52662 (typically 45 minutes face-to-face)  [] RE-EVAL     [x] UU(19796) x  2  [] DRY NEEDLE 1 OR 2 MUSCLES  [] NMR (24656) x     [] DRY NEEDLE 3+ MUSCLES  [] Manual (89558) x       [x] TA (79213) x 1     [] Mech Traction (98747)  [] ES(attended) (91834)     [] ES (un) (50497):   [] VASO (02191)  [] Other:      GOALS  Patient stated goal: \"be able to get in and out of the tractor\"  [] Progressing: [] Met: [x] Not Met: [] Adjusted    Therapist goals for Patient:   Short Term Goals: To be achieved in: 2 weeks  1. Independent in HEP and progression per patient tolerance, in order to prevent re-injury. [] Progressing: [] Met: [x] Not Met: [] Adjusted  2. Patient will have a decrease in pain to facilitate improvement in movement, function, and ADLs as indicated by Functional Deficits. [] Progressing: [] Met: [x] Not Met: [] Adjusted    Long Term Goals: To be achieved in: 8 weeks  1. Disability index score of 10% or less for the LEFS to assist with reaching prior level of function. [] Progressing: [] Met: [x] Not Met: [] Adjusted  2. Patient will demonstrate increased AROM to full LE baljeet to allow for proper joint functioning as indicated by patients Functional Deficits. [] Progressing: [] Met: [x] Not Met: [] Adjusted  3. Patient will demonstrate an increase in Strength to good proximal hip strength and control, within 5lb HHD in LE to allow for proper functional mobility as indicated by patients Functional Deficits. [] Progressing: [] Met: [x] Not Met: [] Adjusted  4.  Patient will return to daily functional activities without

## 2023-03-23 ENCOUNTER — HOSPITAL ENCOUNTER (OUTPATIENT)
Dept: PHYSICAL THERAPY | Age: 73
Setting detail: THERAPIES SERIES
Discharge: HOME | End: 2023-03-23
Payer: MEDICARE

## 2023-03-23 PROCEDURE — 97110 THERAPEUTIC EXERCISES: CPT

## 2023-03-23 PROCEDURE — 97112 NEUROMUSCULAR REEDUCATION: CPT

## 2023-03-23 NOTE — FLOWSHEET NOTE
Patient is not progressing as expected and requires additional follow up with physician  [] Other    Prognosis for POC: [x] Good [] Fair  [] Poor    Patient requires continued skilled intervention: [x] Yes  [] No        PLAN: 1-2x/week x8 weeks  [x] Continue per plan of care [] Alter current plan (see comments)  [] Plan of care initiated [] Hold pending MD visit [] Discharge    Electronically signed by: Elena PryorsterBEVERLY    Note: If patient does not return for scheduled/recommended follow up visits, this note will serve as a discharge from care along with the most recent update on progress.

## 2023-03-28 ENCOUNTER — HOSPITAL ENCOUNTER (OUTPATIENT)
Dept: PHYSICAL THERAPY | Age: 73
Setting detail: THERAPIES SERIES
Discharge: HOME | End: 2023-03-28
Payer: MEDICARE

## 2023-03-28 PROCEDURE — 97140 MANUAL THERAPY 1/> REGIONS: CPT

## 2023-03-28 PROCEDURE — 97530 THERAPEUTIC ACTIVITIES: CPT

## 2023-03-28 PROCEDURE — 97110 THERAPEUTIC EXERCISES: CPT

## 2023-03-28 NOTE — FLOWSHEET NOTE
[place] [address] [facility], [address]  Phone: [phone] Fax: [phone]              Physical Therapy Treatment Note/ Progress Report:       Date:  2023    Patient Name:  Wilman Badillo    :  1950  MRN: [id number]  Restrictions/Precautions:    Medical/Treatment Diagnosis Information:  Diagnosis: M62.81 muscle weakness, generalized  Treatment Diagnosis: I80.14  Insurance/Certification information:  PT Insurance Information: Medicare; [employer] [place]; ded met; [place] visits  Physician Information:  Tai [facility] signed (Y/N):     Date of Patient follow up with Physician:      Progress Report: []  Yes  [x]  No     Date Range for reporting period:  Beginnin2023  Endin days or 25 visits    Progress report due (10 Rx/or 30 days whichever is less):     Recertification due (POC duration/ or 90 days whichever is less): 3/11/2023    Visit # Insurance Allowable Auth Needed   28 [place] []Yes    [x]No     Latex Allergy:  [x]NO      []YES  Preferred Language for Healthcare:   [x]English       []other:    Functional Scale: LEFS:   Date assessed: 2023    Pain level:  1/10 low back     SUBJECTIVE:  Pt states that he has been doing well with home exercises. He has been able to stand up a few times from his chair in the barn that sits lower to the ground, but not consistently yet. Pt notes that one of his tractor steps is about 18 inches off the ground, which he will have a lot of difficulty with.       OBJECTIVE:   Observation:   Test measurements:      2023  ROM LEFT RIGHT   Lumbar flex FT to ankles; *seated   Lumbar ext 5 deg *seated   Lumbar rotation 70% 75%           HIP Flex 109        RESTRICTIONS/PRECAUTIONS: hx of stroke- affected L side; L side vision limited; increased falls risk    Exercises/Interventions:     Therapeutic Ex (65281)   Min: 24 min Sets/sec Reps Notes/CUES   Retro

## 2023-03-30 ENCOUNTER — HOSPITAL ENCOUNTER (OUTPATIENT)
Dept: PHYSICAL THERAPY | Age: 73
Setting detail: THERAPIES SERIES
Discharge: HOME | End: 2023-03-30
Payer: MEDICARE

## 2023-03-30 PROCEDURE — 97530 THERAPEUTIC ACTIVITIES: CPT

## 2023-03-30 PROCEDURE — 97110 THERAPEUTIC EXERCISES: CPT

## 2023-03-30 NOTE — FLOWSHEET NOTE
6 in at half wall; cues for front leg drive   Lateral step up and over 2 10 Bilat, 6in box @ 1/2 wall   Mini squats at wall BUE support from half wall, focused on holds       Therapeutic Exercise and NMR EXR  [x] (68174) Provided verbal/tactile cueing for activities related to strengthening, flexibility, endurance, ROM for improvements in LE, proximal hip, and core control with self care, mobility, lifting, ambulation. [x] (20985) Provided verbal/tactile cueing for activities related to improving balance, coordination, kinesthetic sense, posture, motor skill, proprioception  to assist with LE, proximal hip, and core control in self care, mobility, lifting, ambulation and eccentric single leg control.      NMR and Therapeutic Activities:    [x] (63194 or 75903) Provided verbal/tactile cueing for activities related to improving balance, coordination, kinesthetic sense, posture, motor skill, proprioception and motor activation to allow for proper function of core, proximal hip and LE with self care and ADLs and functional mobility.   [] (36019) Gait Re-education- Provided training and instruction to the patient for proper LE, core and proximal hip recruitment and positioning and eccentric body weight control with ambulation re-education including up and down stairs     Home Exercise Program:    [x] (49222) Reviewed/Progressed HEP activities related to strengthening, flexibility, endurance, ROM of core, proximal hip and LE for functional self-care, mobility, lifting and ambulation/stair navigation   [] (64733)Reviewed/Progressed HEP activities related to improving balance, coordination, kinesthetic sense, posture, motor skill, proprioception of core, proximal hip and LE for self care, mobility, lifting, and ambulation/stair navigation      Manual Treatments:  PROM / STM / Oscillations-Mobs:  G-I, II, III, IV (PA's, Inf., Post.)  [] (09503) Provided manual therapy to mobilize LE, proximal hip and/or LS spine soft

## 2023-04-04 ENCOUNTER — HOSPITAL ENCOUNTER (OUTPATIENT)
Dept: PHYSICAL THERAPY | Age: 73
Setting detail: THERAPIES SERIES
Discharge: HOME | End: 2023-04-04
Payer: MEDICARE

## 2023-04-04 PROCEDURE — 97530 THERAPEUTIC ACTIVITIES: CPT

## 2023-04-04 PROCEDURE — 97110 THERAPEUTIC EXERCISES: CPT

## 2023-04-04 NOTE — FLOWSHEET NOTE
demonstrate increased AROM to full LE baljeet to allow for proper joint functioning as indicated by patients Functional Deficits. [] Progressing: [] Met: [x] Not Met: [] Adjusted  3. Patient will demonstrate an increase in Strength to good proximal hip strength and control, within 5lb HHD in LE to allow for proper functional mobility as indicated by patients Functional Deficits. [] Progressing: [] Met: [x] Not Met: [] Adjusted  4. Patient will return to daily functional activities without increased symptoms or restriction. [] Progressing: [] Met: [x] Not Met: [] Adjusted  5. Patient will be able to stand from a standard height chair without increased symptoms or restrictions. [] Progressing: [] Met: [x] Not Met: [] Adjusted  5. BY DISCHARGE: Pt will be able to get in and out of the tractor at the end of the day without assistance or restrictions. [] Progressing: [] Met: [x] Not Met: [] Adjusted     ASSESSMENT:  Decreased exercise tolerance today per Pt not feeling well and having some difficulty regulating blood sugar over the last 12 hours. Pt is challenged w/ balance likely increased due to history of co morbidities. Pt does have difficult time w/ anterior weight shift 2/2 neuropathy. Requires cues for eccentric control and preventing hyperextension through knees. Has slight increase in difficulty w/ LLE single leg loading 2/2 history of stroke. Increased time for task completion and rest breaks. Continue to progress functional strength and balance to improve independence and decrease falls risk. Decrease frequency to 1x/week as pt increases work load at home.         **NEEDS KX MODIFIER FOR CHARGES**    Treatment/Activity Tolerance:  [] Patient tolerated treatment well [x] Patient limited by fatique  [] Patient limited by pain  [] Patient limited by other medical complications  [] Other:     Overall Progression Towards Functional goals/ Treatment Progress Update:  [x] Patient is progressing as expected

## 2023-10-31 ENCOUNTER — TELEPHONE (OUTPATIENT)
Dept: CARDIOLOGY CLINIC | Age: 73
End: 2023-10-31

## 2023-10-31 NOTE — TELEPHONE ENCOUNTER
Mrs. Tunde Sanchez said he has been feeling sick x2 weeks then last Friday, his heart rate started jumping around, up to the 120's. They were told he was in atrial fib, not new, already on DOAC and antiarrhythmic. I told her Dr. Ary Umana is not at [facility] until January. She is ok to come to  and knows that Teo Marinelli will look for an appointment.

## 2023-10-31 NOTE — TELEPHONE ENCOUNTER
Pt is currently in [facility] and wife states she was told to schedule him an appt with his cardiologist. Pt would prefer [address] location. Please call to advise   Thank you.

## 2023-11-03 ENCOUNTER — OFFICE VISIT (OUTPATIENT)
Dept: CARDIOLOGY CLINIC | Age: 73
End: 2023-11-03

## 2023-11-03 VITALS
HEART RATE: 64 BPM | OXYGEN SATURATION: 95 % | SYSTOLIC BLOOD PRESSURE: 110 MMHG | HEIGHT: 72 IN | DIASTOLIC BLOOD PRESSURE: 72 MMHG | BODY MASS INDEX: 34.57 KG/M2 | WEIGHT: 255.2 LBS

## 2023-11-03 DIAGNOSIS — I50.32 CHRONIC DIASTOLIC CONGESTIVE HEART FAILURE (HCC): ICD-10-CM

## 2023-11-03 DIAGNOSIS — I48.19 PERSISTENT ATRIAL FIBRILLATION (HCC): ICD-10-CM

## 2023-11-03 DIAGNOSIS — I10 ESSENTIAL HYPERTENSION: ICD-10-CM

## 2023-11-03 DIAGNOSIS — Z86.73 H/O: CVA (CEREBROVASCULAR ACCIDENT): ICD-10-CM

## 2023-11-03 DIAGNOSIS — E78.5 HYPERLIPIDEMIA, UNSPECIFIED HYPERLIPIDEMIA TYPE: ICD-10-CM

## 2023-11-03 DIAGNOSIS — Z91.81 AT RISK FOR FALLS: ICD-10-CM

## 2023-11-03 DIAGNOSIS — R00.2 PALPITATIONS: Primary | ICD-10-CM

## 2023-11-03 NOTE — PATIENT INSTRUCTIONS
Follow up with Dr Sadie Francisco in [place] the week of March 5th     Referral to Dr Mitali Escobedo to discuss a possible ablation for afib and possible watchman device to get you off of the blood thinner. Call for any questions or concerns.

## 2023-11-03 NOTE — PROGRESS NOTES
he does not currently use alcohol. FamHx: No family history of premature coronary artery disease, sudden death, or aneurysm  Allergies: Patient has no known allergies. MEDICATIONS      Prior to Admission medications    Medication Sig Start Date End Date Taking?  Authorizing Provider   pioglitazone (ACTOS) 45 MG tablet TAKE 1 TABLET BY MOUTH EVERY DAY 1/6/23  Yes Ric Heath MD   ELIQUIS 5 MG TABS tablet TAKE 1 TABLET BY MOUTH TWICE A DAY 11/22/22  Yes Ric Heath MD   furosemide (LASIX) 40 MG tablet TAKE 1 TABLET BY MOUTH TWICE A DAY 11/22/22  Yes Provider, MD Ric   carvedilol (COREG) 3.125 MG tablet TAKE 1 TABLET BY MOUTH TWICE A DAY 12/30/22  Yes ProviderRic MD   propafenone (RYTHMOL) 150 MG tablet TAKE 1 TABLET BY MOUTH TWICE A DAY 11/22/22  Yes ProviderRic MD   dilTIAZem (TIAZAC) 300 MG extended release capsule Take 1 capsule by mouth daily   Yes Provider, MD Ric   ASPIRIN LOW DOSE 81 MG EC tablet TAKE 1 TABLET BY MOUTH EVERY DAY 12/27/22  Yes Provider, MD Ric   pravastatin (PRAVACHOL) 40 MG tablet TAKE 1 TABLET BY MOUTH EVERY DAY AT NIGHT 1/23/23  Yes Provider, MD Ric   amLODIPine (NORVASC) 5 MG tablet TAKE 1 TABLET BY MOUTH EVERY DAY 1/23/23  Yes Provider, MD Ric   tamsulosin (FLOMAX) 0.4 MG capsule TAKE 1 CAPSULE BY MOUTH EVERY DAY 12/27/22  Yes ProviderRic MD   metFORMIN (GLUCOPHAGE) 500 MG tablet TAKE 1 TABLET BY MOUTH EVERY DAY 12/14/22  Yes Provider, MD Ric   FOLINIC-PLUS 4-50-2 MG TABS TAKE 1 TABLET BY MOUTH EVERY DAY 12/27/22  Yes Provider, MD Ric   brimonidine (ALPHAGAN) 0.2 % ophthalmic solution INSTILL 1 DROP INTO LEFT EYE TWICE A DAY 2/5/23  Yes Ric Heath MD   ipratropium-albuterol (DUONEB) 0.5-2.5 (3) MG/3ML SOLN nebulizer solution USE 1 VIAL VIA NEBULIZER EVERY 6 HOURS 11/17/22  Yes Ric Heath MD   latanoprost (XALATAN) 0.005 % ophthalmic solution INSTILL 1 DROP

## 2023-11-29 ENCOUNTER — HOSPITAL ENCOUNTER (OUTPATIENT)
Dept: PHYSICAL THERAPY | Age: 73
Setting detail: THERAPIES SERIES
Discharge: HOME | End: 2023-11-29
Payer: MEDICARE

## 2023-11-29 DIAGNOSIS — M25.562 CHRONIC PAIN OF LEFT KNEE: ICD-10-CM

## 2023-11-29 DIAGNOSIS — R53.1 WEAKNESS GENERALIZED: Primary | ICD-10-CM

## 2023-11-29 DIAGNOSIS — G89.29 CHRONIC PAIN OF LEFT KNEE: ICD-10-CM

## 2023-11-29 PROCEDURE — 97162 PT EVAL MOD COMPLEX 30 MIN: CPT

## 2023-11-29 PROCEDURE — 97530 THERAPEUTIC ACTIVITIES: CPT

## 2023-11-29 NOTE — PLAN OF CARE
management   Reduced participation in work activities    Classification :   Signs/symptoms consistent with knee OA/hip OA    Barriers to/and or personal factors that will affect rehab potential:   Age  Co-morbidities  profession/work barriers    Prognosis/Rehab Potential:  [] Excellent     [x] Good     [] Fair     [] Poor         Tolerance of evaluation/treatment:   [] Excellent     [x] Good     [] Fair     [] Poor      Physical Therapy Evaluation Complexity Justification  [x] A history of present problem and 3 or more personal factors and/or co-morbidities that impact the plan of care  [x] A total of 1-2 elements  found upon examination of body systems using standardized tests and measures addressing any of the following: body structures, functions (impairments), activity limitations, and/or participation restrictions  [x] A clinical presentation with evolving clinical presentation with changing characteristics  [x] Clinical decision making of MODERATE (66646 - Typically 30 minutes face-to-face) complexity using standardized patient assessment instrument and/or measurable assessment of functional outcome. GOALS     Patient stated goal: ***  Status: [] Progressing: [] Met: [x] Not Met: [] Adjusted    Therapist goals for Patient:   Short Term Goals: To be achieved in: 2 weeks  Independent in HEP and progression per patient tolerance, in order to progress toward full function and prevent re-injury. Status: [] Progressing: [] Met: [x] Not Met: [] Adjusted  Patient will have a decrease in pain to 0/10 to help  facilitate improvement in movement, function, and ADLs as indicated by functional deficits. Status: [] Progressing: [] Met: [x] Not Met: [] Adjusted    Long Term Goals: To be achieved in: 4-6 weeks  Disability index score of ***% or less for the {outcome measures:17488} to assist with return top prior level of function.     Status: [] Progressing: [] Met: [x] Not Met: [] Adjusted  Improve knee AROM Flexion

## 2023-12-04 ENCOUNTER — HOSPITAL ENCOUNTER (OUTPATIENT)
Dept: PHYSICAL THERAPY | Age: 73
Setting detail: THERAPIES SERIES
Discharge: HOME | End: 2023-12-04
Payer: MEDICARE

## 2023-12-04 PROCEDURE — 97110 THERAPEUTIC EXERCISES: CPT

## 2023-12-04 PROCEDURE — 97530 THERAPEUTIC ACTIVITIES: CPT

## 2023-12-04 PROCEDURE — 97112 NEUROMUSCULAR REEDUCATION: CPT

## 2023-12-04 NOTE — FLOWSHEET NOTE
[address] [facility] and Therapy [address] [place] [address] office: [phone] fax: [phone]      Physical Therapy: TREATMENT/PROGRESS NOTE   Patient: Rena Dixon (25 y.o. male)   Treatment Date: 2023   :  1950 MRN: [id number]   Visit #: 2   Insurance Allowable Auth Needed   medicare []Yes    [x]No    Insurance: Payor: MEDICARE / Plan: MEDICARE PART A AND B / Product Type: *No Product type* /   Insurance ID: [id number] - (Medicare)  Secondary Insurance (if applicable): [employer] [place]   Treatment Diagnosis:     ICD-10-CM    1. Weakness generalized  R53.1       2. Chronic pain of left knee  M25.562     G89.29          Medical Diagnosis:    Age-related physical debility [R54]   Referring Physician: Charlie Norris MD  PCP: Kp Mae DO                             Plan of care signed (Y/N):     Date of Patient follow up with Physician:      Progress Report/POC: NO  POC update due: (10 visits [address], whichever is less) 10 visits or 1/3/2024     Precautions/ Contra-indications:                                                                                          Latex allergy:  NO  Pacemaker:    NO  Contraindications for Manipulation: unhealthy/ multiple comorbidities   Date of Surgery: n/a  Other:     Preferred Language for Healthcare:   [x]English       []other:    SUBJECTIVE EXAMINATION     Patient Report/Comments: Pt states he has no knee pain this morning, felt okay after last session. States he had a hard time getting out of the [employer] pew after sitting for service yesterday.      Test used Initial score  2023   Pain Summary VAS 0-2 0   Functional questionnaire LEFS and Hardy Balance Scale 19/80  28/56    Other:                OBJECTIVE EXAMINATION     Observation:     Test measurements:     Exercises/Interventions:     Therapeutic Ex (01173)  resistance Sets/time Reps Notes/Cues/Progressions   LAQ 3# 3 8-12

## 2023-12-08 ENCOUNTER — HOSPITAL ENCOUNTER (OUTPATIENT)
Dept: PHYSICAL THERAPY | Age: 73
Setting detail: THERAPIES SERIES
Discharge: HOME | End: 2023-12-08
Payer: MEDICARE

## 2023-12-08 PROCEDURE — 97110 THERAPEUTIC EXERCISES: CPT

## 2023-12-08 PROCEDURE — 97530 THERAPEUTIC ACTIVITIES: CPT

## 2023-12-08 NOTE — FLOWSHEET NOTE
[address] [facility] and Therapy [address] [place] [address] office: [phone] fax: [phone]      Physical Therapy: TREATMENT/PROGRESS NOTE   Patient: Darcella Essex (89 y.o. male)   Treatment Date: 2023   :  1950 MRN: [id number]   Visit #: 3   Insurance Allowable Auth Needed   medicare []Yes    [x]No    Insurance: Payor: MEDICARE / Plan: MEDICARE PART A AND B / Product Type: *No Product type* /   Insurance ID: [id number] - (Medicare)  Secondary Insurance (if applicable): [employer] [place]   Treatment Diagnosis:     ICD-10-CM    1. Weakness generalized  R53.1       2. Chronic pain of left knee  M25.562     G89.29          Medical Diagnosis:    Age-related physical debility [R54]   Referring Physician: Maru Upton MD  PCP: Maria Guadalupe Jenkins DO                             Plan of care signed (Y/N):     Date of Patient follow up with Physician:      Progress Report/POC: NO  POC update due: (10 visits [address] [place], whichever is less) 10 visits or 2024     Precautions/ Contra-indications:                                                                                          Latex allergy:  NO  Pacemaker:    NO  Contraindications for Manipulation: unhealthy/ multiple comorbidities   Date of Surgery: n/a  Other:     Preferred Language for Healthcare:   [x]English       []other:    SUBJECTIVE EXAMINATION     Patient Report/Comments: Pt states that he did well after last session. \"My whole left leg is not working well today. \"  Pt reports that his left knee has been unpredictable; \"I never know when it's going to give out. \"  Pt has an appointment on  for a possible cardio procedure to help with his Afib.       Test used Initial score  2023   Pain Summary VAS 0-2 0   Functional questionnaire LEFS and Hardy Balance Scale /    Other:                OBJECTIVE EXAMINATION     Observation: Frequent sitting rest breaks

## 2023-12-11 ENCOUNTER — HOSPITAL ENCOUNTER (OUTPATIENT)
Dept: PHYSICAL THERAPY | Age: 73
Setting detail: THERAPIES SERIES
Discharge: HOME | End: 2023-12-11
Payer: MEDICARE

## 2023-12-11 PROCEDURE — 97530 THERAPEUTIC ACTIVITIES: CPT

## 2023-12-11 PROCEDURE — 97110 THERAPEUTIC EXERCISES: CPT

## 2023-12-11 NOTE — FLOWSHEET NOTE
activities related to strengthening, flexibility, endurance, ROM performed to prevent loss of range of motion, maintain or improve muscular strength or increase flexibility, following either an injury or surgery. (30885) [facility] - Reviewed/Progressed HEP activities related to strengthening, flexibility, endurance, ROM performed to prevent loss of range of motion, maintain or improve muscular strength or increase flexibility, following either an injury or surgery. (62684) NEUROMUSCULAR RE-EDUCATION - Therapeutic procedure, 1 or more areas, each 15 minutes; neuromuscular reeducation of movement, balance, coordination, kinesthetic sense, posture, and/or proprioception for sitting and/or standing activities  (41820) THERAPEUTIC ACTIVITY - use of dynamic activities to improve functional performance. (Ex include squatting, ascending/descending stairs, walking, bending, lifting, catching, throwing, pushing, pulling, jumping.)  Direct, one on one contact, billed in 15-minute increments. (94039) MANUAL THERAPY -  Manual therapy techniques, 1 or more regions, each 15 minutes (Mobilization/manipulation, manual lymphatic drainage, manual traction) for the purpose of modulating pain, promoting relaxation,  increasing ROM, reducing/eliminating soft tissue swelling/inflammation/restriction, improving soft tissue extensibility and allowing for proper ROM for normal function with self care, mobility, lifting and ambulation  (23588) GAIT RE-EDUCATION - Provided training and instruction to the patient for proper joint and muscle recruitment and positioning and eccentric body weight control with ambulation re-education including up and down stairs. Therapeutic procedure, one or more areas, each 15 minutes;     TREATMENT PLAN   Plan: Cont POC- Continue emphasis/focus on exercise progression, improving proper muscle recruitment and activation/motor control patterns, and static and dynamic balance.  Next visit plan to

## 2023-12-15 ENCOUNTER — HOSPITAL ENCOUNTER (OUTPATIENT)
Dept: PHYSICAL THERAPY | Age: 73
Setting detail: THERAPIES SERIES
Discharge: HOME | End: 2023-12-15
Payer: MEDICARE

## 2023-12-15 PROCEDURE — 97110 THERAPEUTIC EXERCISES: CPT

## 2023-12-15 PROCEDURE — 97530 THERAPEUTIC ACTIVITIES: CPT

## 2023-12-15 NOTE — FLOWSHEET NOTE
[address] [place] [address] and Therapy [address] [place] [address] office: [phone] fax: [phone]      Physical Therapy: TREATMENT/PROGRESS NOTE   Patient: Nir Jaeger (29 y.o. male)   Treatment Date: 12/15/2023   :  1950 MRN: [id number]   Visit #: 5   Insurance Allowable Auth Needed   medicare []Yes    [x]No    Insurance: Payor: MEDICARE / Plan: MEDICARE PART A AND B / Product Type: *No Product type* /   Insurance ID: [id number] - (Medicare)  Secondary Insurance (if applicable): [employer] [place]   Treatment Diagnosis:     ICD-10-CM    1. Weakness generalized  R53.1       2. Chronic pain of left knee  M25.562     G89.29          Medical Diagnosis:    Age-related physical debility [R54]   Referring Physician: Michael Haywood MD  PCP: Lauren Landis DO                             Plan of care signed (Y/N):     Date of Patient follow up with Physician:      Progress Report/POC: NO  POC update due: (10 visits [address] [place], whichever is less) 10 visits or 2024     Precautions/ Contra-indications:                                                                                          Latex allergy:  NO  Pacemaker:    NO  Contraindications for Manipulation: unhealthy/ multiple comorbidities   Date of Surgery: n/a  Other:     Preferred Language for Healthcare:   [x]English       []other:    SUBJECTIVE EXAMINATION     Patient Report/Comments: Pt states that he has been practicing the sit to stands at home. No new complaints today. Test used Initial score  12/4/23 12/15/2023   Pain Summary VAS 0-2 0   Functional questionnaire LEFS and Hardy Balance Scale   28/56    Other:                OBJECTIVE EXAMINATION     Observation: Fewer sitting rest breaks needed between standing exercises today.     Test measurements:     Exercises/Interventions:     Therapeutic Ex (63231)  resistance Sets/time Reps Notes/Cues/Progressions   LAQ 3# 3 8-12

## 2023-12-28 ENCOUNTER — HOSPITAL ENCOUNTER (OUTPATIENT)
Dept: PHYSICAL THERAPY | Age: 73
Setting detail: THERAPIES SERIES
Discharge: HOME | End: 2023-12-28
Payer: MEDICARE

## 2023-12-28 PROCEDURE — 97110 THERAPEUTIC EXERCISES: CPT

## 2023-12-28 PROCEDURE — 97530 THERAPEUTIC ACTIVITIES: CPT

## 2023-12-28 NOTE — FLOWSHEET NOTE
[address] [place] [address] and Therapy [address] [place] [address] office: [phone] fax: [phone]      Physical Therapy: TREATMENT/PROGRESS NOTE   Patient: Radha Mills (31 y.o. male)   Treatment Date: 2023   :  1950 MRN: [id number]   Visit #: 8   Insurance Allowable Auth Needed   medicare []Yes    [x]No    Insurance: Payor: MEDICARE / Plan: MEDICARE PART A AND B / Product Type: *No Product type* /   Insurance ID: [id number] - (Medicare)  Secondary Insurance (if applicable): [facility]   Treatment Diagnosis:     ICD-10-CM    1. Weakness generalized  R53.1       2. Chronic pain of left knee  M25.562     G89.29          Medical Diagnosis:    Age-related physical debility [R54]   Referring Physician: Ben Martines MD  PCP: Migdalia Farr DO                             Plan of care signed (Y/N):     Date of Patient follow up with Physician:      Progress Report/POC: NO  POC update due: (10 visits [address] [place], whichever is less) 10 visits or 2024     Precautions/ Contra-indications:                                                                                          Latex allergy:  NO  Pacemaker:    NO  Contraindications for Manipulation: unhealthy/ multiple comorbidities   Date of Surgery: n/a  Other:     Preferred Language for Healthcare:   [x]English       []other:    SUBJECTIVE EXAMINATION     Patient Report/Comments: Pt states that he has not felt as dizzy today, but it takes him a little while in the morning to feel like he is more steady. Test used Initial score  2023   Pain Summary VAS 0-2 0   Functional questionnaire LEFS and Hardy Balance Scale     Other:                OBJECTIVE EXAMINATION     Observation: A few rest breaks taken between exercises today for fatigue/SOB.      Test measurements:     Exercises/Interventions:     Therapeutic Ex (53914)  resistance Sets/time Reps

## 2024-01-02 ENCOUNTER — HOSPITAL ENCOUNTER (OUTPATIENT)
Dept: PHYSICAL THERAPY | Age: 74
Setting detail: THERAPIES SERIES
Discharge: HOME | End: 2024-01-02
Payer: MEDICARE

## 2024-01-02 PROCEDURE — 97530 THERAPEUTIC ACTIVITIES: CPT

## 2024-01-02 PROCEDURE — 97110 THERAPEUTIC EXERCISES: CPT

## 2024-01-02 NOTE — FLOWSHEET NOTE
[facility] and Therapy [address] [place] [address] office: [phone] fax: [phone]      Physical Therapy: TREATMENT/PROGRESS NOTE   Patient: Antolin Pena (73 y.o. male)   Treatment Date: 2024   :  1950 MRN: [id number]   Visit #: 9   Insurance Allowable Auth Needed   medicare []Yes    [x]No    Insurance: Payor: MEDICARE / Plan: MEDICARE PART A AND B / Product Type: *No Product type* /   Insurance ID: [id number] - (Medicare)  Secondary Insurance (if applicable): [employer] [place]   Treatment Diagnosis:     ICD-10-CM    1. Weakness generalized  R53.1       2. Chronic pain of left knee  M25.562     G89.29          Medical Diagnosis:    Age-related physical debility [R54]   Referring Physician: Kinza Padilla MD  PCP: Kaleb Lujan DO                             Plan of care signed (Y/N):     Date of Patient follow up with Physician:      Progress Report/POC: NO  POC update due: (10 visits /OR AUTH LIMITS, whichever is less) 10 visits or 2024     Precautions/ Contra-indications:                                                                                          Latex allergy:  NO  Pacemaker:    NO  Contraindications for Manipulation: unhealthy/ multiple comorbidities   Date of Surgery: n/a  Other:     Preferred Language for Healthcare:   [x]English       []other:    SUBJECTIVE EXAMINATION     Patient Report/Comments: Pt states that he is having more issues with his cataracts and cannot see things up close well such as letters on the TV. He is not driving due to this recent exacerbation.  \"I have been doing more exercises around the house.\"       Test used Initial score  2024   Pain Summary VAS 0-2 0   Functional questionnaire LEFS and Hardy Balance Scale   28/    Other:                OBJECTIVE EXAMINATION     Observation: A few rest breaks taken between exercises today for fatigue/SOB.     Test

## 2024-01-04 ENCOUNTER — HOSPITAL ENCOUNTER (OUTPATIENT)
Dept: PHYSICAL THERAPY | Age: 74
Setting detail: THERAPIES SERIES
Discharge: HOME | End: 2024-01-04
Payer: MEDICARE

## 2024-01-04 PROCEDURE — 97110 THERAPEUTIC EXERCISES: CPT

## 2024-01-04 PROCEDURE — 97530 THERAPEUTIC ACTIVITIES: CPT

## 2024-01-04 NOTE — FLOWSHEET NOTE
[facility] - Outpatient Rehabilitation and Therapy [address] [place] [address] office: [phone] fax: [phone]      Physical Therapy: TREATMENT/PROGRESS NOTE   Patient: Antolin Pena (73 y.o. male)   Treatment Date: 2024   :  1950 MRN: [id number]   Visit #: 10   Insurance Allowable Auth Needed   medicare []Yes    [x]No    Insurance: Payor: MEDICARE / Plan: MEDICARE PART A AND B / Product Type: *No Product type* /   Insurance ID: [id number] - (Medicare)  Secondary Insurance (if applicable): [employer] [place]   Treatment Diagnosis:     ICD-10-CM    1. Weakness generalized  R53.1       2. Chronic pain of left knee  M25.562     G89.29          Medical Diagnosis:    Age-related physical debility [R54]   Referring Physician: Kinza Padilla MD  PCP: Kaleb Lujan DO                             Plan of care signed (Y/N):     Date of Patient follow up with Physician:      Progress Report/POC: NO  POC update due: (10 visits /OR AUTH LIMITS, whichever is less) 10 visits or 2024     Precautions/ Contra-indications:                                                                                          Latex allergy:  NO  Pacemaker:    NO  Contraindications for Manipulation: unhealthy/ multiple comorbidities   Date of Surgery: n/a  Other:     Preferred Language for Healthcare:   [x]English       []other:    SUBJECTIVE EXAMINATION     Patient Report/Comments: Pt states that his vision is no worse than the other day, but he is having a lot of difficulty seeing up close with his cataract.  Antolin has been trying to do more exercises in his chair and walking around the house to work on his strength and stamina.  \"I walked 90 feet in the house and put some dishes away.  I did ok until I put the dishes away and needed to sit down and rest until my Oxygen came back up.\"      Test used Initial score  2024   Pain Summary VAS 0-2 0   Functional questionnaire

## 2024-01-08 ENCOUNTER — HOSPITAL ENCOUNTER (OUTPATIENT)
Dept: PHYSICAL THERAPY | Age: 74
Setting detail: THERAPIES SERIES
Discharge: HOME | End: 2024-01-08
Payer: MEDICARE

## 2024-01-08 PROCEDURE — 97530 THERAPEUTIC ACTIVITIES: CPT

## 2024-01-08 PROCEDURE — 97110 THERAPEUTIC EXERCISES: CPT

## 2024-01-08 NOTE — PLAN OF CARE
(TIMED) minutes # CPT Code (UNTIMED) #     Therex (70481)  15 1  EVAL:MODERATE (01349 - Typically 30 minutes face-to-face)     Neuromusc. Re-ed (23437)    Re-Eval (86462)     Manual (41766)    Estim Unattended (98773)     Ther. Act (65766) 25 2  Mech. Traction (39494)     Gait (47930)    Dry Needle 1-2 muscle (56316)     Aquatic Therex (76429)    Dry Needle 3+ muscle (20561)     Iontophoresis (91195)    VASO (94320)     Ultrasound (28862)    Group Therapy (82073)     Estim Attended (40756)    Canalith Repositioning (68818)     Other:    Other:    Total Timed Code Tx Minutes 40 3       Total Treatment Minutes 40        Charge Justification:  (63662) THERAPEUTIC EXERCISE - Provided verbal/tactile cueing for activities related to strengthening, flexibility, endurance, ROM performed to prevent loss of range of motion, maintain or improve muscular strength or increase flexibility, following either an injury or surgery.   (41947) HOME EXERCISE PROGRAM - Reviewed/Progressed HEP activities related to strengthening, flexibility, endurance, ROM performed to prevent loss of range of motion, maintain or improve muscular strength or increase flexibility, following either an injury or surgery.  (54919) NEUROMUSCULAR RE-EDUCATION - Therapeutic procedure, 1 or more areas, each 15 minutes; neuromuscular reeducation of movement, balance, coordination, kinesthetic sense, posture, and/or proprioception for sitting and/or standing activities  (15115) THERAPEUTIC ACTIVITY - use of dynamic activities to improve functional performance. (Ex include squatting, ascending/descending stairs, walking, bending, lifting, catching, throwing, pushing, pulling, jumping.)  Direct, one on one contact, billed in 15-minute increments.  (68109) MANUAL THERAPY -  Manual therapy techniques, 1 or more regions, each 15 minutes (Mobilization/manipulation, manual lymphatic drainage, manual traction) for the purpose of modulating pain, promoting relaxation,

## 2024-01-09 ENCOUNTER — APPOINTMENT (OUTPATIENT)
Dept: PHYSICAL THERAPY | Age: 74
End: 2024-01-09
Payer: MEDICARE

## 2024-01-11 ENCOUNTER — HOSPITAL ENCOUNTER (OUTPATIENT)
Dept: PHYSICAL THERAPY | Age: 74
Setting detail: THERAPIES SERIES
Discharge: HOME | End: 2024-01-11
Payer: MEDICARE

## 2024-01-11 PROCEDURE — 97530 THERAPEUTIC ACTIVITIES: CPT

## 2024-01-11 PROCEDURE — 97110 THERAPEUTIC EXERCISES: CPT

## 2024-01-11 NOTE — FLOWSHEET NOTE
[facility] - Outpatient Rehabilitation and Therapy [address] [place] [address] office: [phone] fax: [phone]          Physical Therapy: TREATMENT/PROGRESS NOTE   Patient: Antolin Pena (73 y.o. male)   Treatment Date: 2024   :  1950 MRN: [id number]   Visit #: 12   Insurance Allowable Auth Needed   medicare []Yes    [x]No    Insurance: Payor: MEDICARE / Plan: MEDICARE PART A AND B / Product Type: *No Product type* /   Insurance ID: [id number] - (Medicare)  Secondary Insurance (if applicable): [employer] [place]   Treatment Diagnosis:     ICD-10-CM    1. Weakness generalized  R53.1       2. Chronic pain of left knee  M25.562     G89.29          Medical Diagnosis:    Age-related physical debility [R54]   Referring Physician: Kinza Padilla MD  PCP: Kaleb Lujan DO                             Plan of care signed (Y/N):     Date of Patient follow up with Physician:      Progress Report/POC: no  POC update due: (10 visits /OR AUTH LIMITS, whichever is less) 10 visits or 2024     Precautions/ Contra-indications:                                                                                          Latex allergy:  NO  Pacemaker:    NO  Contraindications for Manipulation: unhealthy/ multiple comorbidities   Date of Surgery: n/a  Other:     Preferred Language for Healthcare:   [x]English       []other:    SUBJECTIVE EXAMINATION     Patient Report/Comments: Pt states vision continues to be struggle; he saw an eye doctor yesterday and may e having cataract surgery in a month.  Overall feeling a little more fatigued today. \"I wasn't able to do my exercises yesterday because I was gone most of the day for my appointment.\"       Test used Initial score  2024   Pain Summary VAS 0-2 0   Functional questionnaire LEFS and Nelson Balance Scale  LEFS:   NELSON/56   Other:                OBJECTIVE EXAMINATION     Observation: A few rest

## 2024-01-16 ENCOUNTER — HOSPITAL ENCOUNTER (OUTPATIENT)
Dept: PHYSICAL THERAPY | Age: 74
Setting detail: THERAPIES SERIES
Discharge: HOME | End: 2024-01-16
Payer: MEDICARE

## 2024-01-16 PROCEDURE — 97530 THERAPEUTIC ACTIVITIES: CPT

## 2024-01-16 PROCEDURE — 97110 THERAPEUTIC EXERCISES: CPT

## 2024-01-16 NOTE — FLOWSHEET NOTE
OBJECTIVE EXAMINATION     Observation:   1/16/2024: bilateral pitting edema lower legs; mild tenderness proximal lower leg bilaterally; no visible redness    Test measurements:     Exercises/Interventions:     Therapeutic Ex (66692)  resistance Sets/time Reps Notes/Cues/Progressions   LAQ 3# 3 8-12 Challenging to achieve full AROM LLE   bike    Add NV          Seated march  3# 3 8-12    Leg press 90# Cues to prevent lock out of knee    Seated HR  2in box under toes 3 10    Standing hip ext c assist from 1/2 wall     Standing marches  1/2 wall, level surface   Standing hip abd kicks at wall  1 20 No wt   TKE    3pl, cane in R hand   Standing HS curl 3#      Step ups  6in box at 1/2 wall   Banded side stepping 1 20' Fatigue by end, cane with Pt for balance assist as needed, SBA   BOSU Lunges Wall assist for UE as needed 2x10 each leg   Squats at wall  UE assist as needed          Manual Intervention (98293)  TIME                                        NMR re-education (68315) resistance Sets/time Reps CUES NEEDED   Cone step over - lateral  Rest break for fatigue, SOB                               Therapeutic Activity (11949)  Sets/time     NELSON       STS Chair + 2 airex 3 6-8 Rest breaks as needed                          Modalities:    No modalities applied this session    Education/Home Exercise Program: Not enough time for HEP instruction this visit.  Plan to address at follow up.      ASSESSMENT     Today's Assessment:  Pt has been seen for 13 visits of outpatient PT to address LE strength, mobility, and balance deficits. Pt has demonstrated good improvement through LEFS and NELSON balance testing scores, tolerance to increased exercise load during session and decreased need of nebulizer for shortness of breath. Pt prognosis is limited due to co-morbidities and chronicity of functional limitations. Pt to continue to benefit from skilled PT to continue LE functional strength and balance and assist in safe

## 2024-01-18 ENCOUNTER — HOSPITAL ENCOUNTER (OUTPATIENT)
Dept: PHYSICAL THERAPY | Age: 74
Setting detail: THERAPIES SERIES
Discharge: HOME | End: 2024-01-18
Payer: MEDICARE

## 2024-01-18 PROCEDURE — 97110 THERAPEUTIC EXERCISES: CPT

## 2024-01-18 PROCEDURE — 97530 THERAPEUTIC ACTIVITIES: CPT

## 2024-01-18 NOTE — FLOWSHEET NOTE
progress weights, progress reps, add new exercises, and progress balance per Pt yash.     Electronically Signed by Susy Everett, PTA              Date: 01/18/2024     Note: If patient does not return for scheduled/recommended follow up visits, this note will serve as a discharge from care along with the most recent update on progress.

## 2024-01-22 ENCOUNTER — HOSPITAL ENCOUNTER (OUTPATIENT)
Dept: PHYSICAL THERAPY | Age: 74
Setting detail: THERAPIES SERIES
Discharge: HOME | End: 2024-01-22
Payer: MEDICARE

## 2024-01-22 PROCEDURE — 97110 THERAPEUTIC EXERCISES: CPT

## 2024-01-22 PROCEDURE — 97530 THERAPEUTIC ACTIVITIES: CPT

## 2024-01-22 NOTE — FLOWSHEET NOTE
[facility] - Outpatient Rehabilitation and Therapy [address] [place] [address] office: [phone] fax: [phone]          Physical Therapy: TREATMENT/PROGRESS NOTE   Patient: Antolin Pena (73 y.o. male)   Treatment Date: 2024   :  1950 MRN: [id number]   Visit #: 15   Insurance Allowable Auth Needed   medicare []Yes    [x]No    Insurance: Payor: MEDICARE / Plan: MEDICARE PART A AND B / Product Type: *No Product type* /   Insurance ID: [id number] - (Medicare)  Secondary Insurance (if applicable): [employer] [place]   Treatment Diagnosis:     ICD-10-CM    1. Weakness generalized  R53.1       2. Chronic pain of left knee  M25.562     G89.29          Medical Diagnosis:    Age-related physical debility [R54]   Referring Physician: Kinza Padilla MD  PCP: Kaleb Lujan DO                             Plan of care signed (Y/N):     Date of Patient follow up with Physician:      Progress Report/POC: no  POC update due: (10 visits /OR AUTH LIMITS, whichever is less) 10 visits or 2024     Precautions/ Contra-indications:                                                                                          Latex allergy:  NO  Pacemaker:    NO  Contraindications for Manipulation: unhealthy/ multiple comorbidities   Date of Surgery: n/a  Other:     Preferred Language for Healthcare:   [x]English       []other:    SUBJECTIVE EXAMINATION     Patient Report/Comments: Pt states his legs and swelling are doing much better than last week.      Test used Initial score  23 POC  2024   Pain Summary VAS 0-2 0/10 3/10 lower leg soreness   Functional questionnaire LEFS and Nelson Balance Scale  LEFS:   NELSON/56    Other:                  OBJECTIVE EXAMINATION     Observation:   2024: minimal swelling present BLE today, mild pitting edema RLE    Test measurements:     Exercises/Interventions:     Therapeutic Ex (54688)  resistance

## 2024-01-23 ENCOUNTER — TELEPHONE (OUTPATIENT)
Dept: CARDIOLOGY CLINIC | Age: 74
End: 2024-01-23

## 2024-01-23 NOTE — TELEPHONE ENCOUNTER
CARDIAC CLEARANCE     What type of procedure are you having?  Cataract topical mac  Which physician is performing your procedure?   Dr Hart  When is your procedure scheduled for?  2/1/24  Where are you having this procedure?  [facility]   Are you taking Blood Thinners?  Eliquis   If so what? (Name/dose/frequesncy)     Does the surgeon want you to stop your blood thinner? No   If so for how long? Does  not need to hold    Phone Number and Contact Name for Physicians office: [phone] Jaime Zimmerman    Fax number to send information: [phone]

## 2024-01-23 NOTE — TELEPHONE ENCOUNTER
WAK:   LOV 11/3/23   F/U 4 mths in Ox  (3/5/24)  Dx: Persist Afib - Eliquis/ referred to EP for possible Watchman         Chronic Diastolic HF          CVA hx           HTN          HLD           No Known CAD or stenting hx

## 2024-01-25 ENCOUNTER — HOSPITAL ENCOUNTER (OUTPATIENT)
Dept: PHYSICAL THERAPY | Age: 74
Setting detail: THERAPIES SERIES
Discharge: HOME | End: 2024-01-25
Payer: MEDICARE

## 2024-01-25 PROCEDURE — 97110 THERAPEUTIC EXERCISES: CPT

## 2024-01-25 PROCEDURE — 97530 THERAPEUTIC ACTIVITIES: CPT

## 2024-01-25 NOTE — FLOWSHEET NOTE
activation/motor control patterns, and static and dynamic balance. Next visit plan to progress weights, progress reps, add new exercises, and progress balance per Pt yash. Will take a week or so off as directed by Pt's Ophthalmologist when Pt has his cataract surgery at the beginning of February.    Electronically Signed by Susy Everett, PTA              Date: 01/25/2024     Note: If patient does not return for scheduled/recommended follow up visits, this note will serve as a discharge from care along with the most recent update on progress.

## 2024-01-30 ENCOUNTER — HOSPITAL ENCOUNTER (OUTPATIENT)
Dept: PHYSICAL THERAPY | Age: 74
Setting detail: THERAPIES SERIES
Discharge: HOME | End: 2024-01-30
Payer: MEDICARE

## 2024-01-30 PROCEDURE — 97530 THERAPEUTIC ACTIVITIES: CPT

## 2024-01-30 PROCEDURE — 97110 THERAPEUTIC EXERCISES: CPT

## 2024-01-30 NOTE — FLOWSHEET NOTE
EXERCISE - Provided verbal/tactile cueing for activities related to strengthening, flexibility, endurance, ROM performed to prevent loss of range of motion, maintain or improve muscular strength or increase flexibility, following either an injury or surgery.   (89351) HOME EXERCISE PROGRAM - Reviewed/Progressed HEP activities related to strengthening, flexibility, endurance, ROM performed to prevent loss of range of motion, maintain or improve muscular strength or increase flexibility, following either an injury or surgery.  (49463) NEUROMUSCULAR RE-EDUCATION - Therapeutic procedure, 1 or more areas, each 15 minutes; neuromuscular reeducation of movement, balance, coordination, kinesthetic sense, posture, and/or proprioception for sitting and/or standing activities  (65619) THERAPEUTIC ACTIVITY - use of dynamic activities to improve functional performance. (Ex include squatting, ascending/descending stairs, walking, bending, lifting, catching, throwing, pushing, pulling, jumping.)  Direct, one on one contact, billed in 15-minute increments.  (40894) MANUAL THERAPY -  Manual therapy techniques, 1 or more regions, each 15 minutes (Mobilization/manipulation, manual lymphatic drainage, manual traction) for the purpose of modulating pain, promoting relaxation,  increasing ROM, reducing/eliminating soft tissue swelling/inflammation/restriction, improving soft tissue extensibility and allowing for proper ROM for normal function with self care, mobility, lifting and ambulation  (68762) GAIT RE-EDUCATION - Provided training and instruction to the patient for proper joint and muscle recruitment and positioning and eccentric body weight control with ambulation re-education including up and down stairs. Therapeutic procedure, one or more areas, each 15 minutes;     TREATMENT PLAN   Plan: Cont POC- Continue emphasis/focus on exercise progression, improving proper muscle recruitment and activation/motor control patterns, and

## 2024-02-07 PROBLEM — Z91.81 AT RISK FOR FALLS: Status: ACTIVE | Noted: 2024-02-07

## 2024-02-13 ENCOUNTER — HOSPITAL ENCOUNTER (OUTPATIENT)
Dept: PHYSICAL THERAPY | Age: 74
Setting detail: THERAPIES SERIES
Discharge: HOME | End: 2024-02-13
Payer: MEDICARE

## 2024-02-13 PROCEDURE — 97530 THERAPEUTIC ACTIVITIES: CPT

## 2024-02-13 PROCEDURE — 97110 THERAPEUTIC EXERCISES: CPT

## 2024-02-13 NOTE — PLAN OF CARE
[facility] - Outpatient Rehabilitation and Therapy [address] [place] [address] office: [phone] fax: [phone]        Physical Therapy Re-Certification Plan of Care/MD UPDATE    Dear  Dr. Kinza Padilla ,    We had the pleasure of treating the following patient for physical therapy services at [facility].  A summary of our findings can be found in the updated assessment below.  This includes our plan of care.  If you have any questions or concerns regarding these findings, please do not hesitate to contact me at the office phone number checked above.  Thank you for the referral.     Physician Signature:________________________________Date:__________________  By signing above (or electronic signature), therapist’s plan is approved by physician      Current Functional Status:   Antolin Pena 1950 continues to present with functional deficits in ROM, joint mobility, strength symmetry, endurance of strength, cardiovascular endurance, and eccentric control  limiting ability with walking on uneven ground, walking up/down stairs, navigate curbs/steps, carrying items, heavy home activity, and yardwork .  During therapy this date, patient required verbal cueing, muscle facilitation, modification of technique, and progression of exercises and program for exercise progression and improving proper muscle recruitment and activation/motor control patterns. Patient will continue to benefit from ongoing evaluation and advanced clinical decision from a Physical Therapist to improve muscle strength, neuromuscular control, endurance, and balance and proprioception to safely return to PLOF and work/work related tasks without symptoms or restrictions.    Overall Response to Treatment:   [x]Patient is responding well to treatment and improvement is noted with regards to goals   []Patient should continue to improve in reasonable time if they continue HEP   []Patient has

## 2024-02-15 ENCOUNTER — APPOINTMENT (OUTPATIENT)
Dept: PHYSICAL THERAPY | Age: 74
End: 2024-02-15
Payer: MEDICARE

## 2024-02-20 ENCOUNTER — APPOINTMENT (OUTPATIENT)
Dept: PHYSICAL THERAPY | Age: 74
End: 2024-02-20
Payer: MEDICARE

## 2024-02-22 ENCOUNTER — APPOINTMENT (OUTPATIENT)
Dept: PHYSICAL THERAPY | Age: 74
End: 2024-02-22
Payer: MEDICARE

## 2024-02-27 ENCOUNTER — APPOINTMENT (OUTPATIENT)
Dept: PHYSICAL THERAPY | Age: 74
End: 2024-02-27
Payer: MEDICARE

## 2024-02-29 ENCOUNTER — APPOINTMENT (OUTPATIENT)
Dept: PHYSICAL THERAPY | Age: 74
End: 2024-02-29
Payer: MEDICARE

## 2024-03-18 ENCOUNTER — HOSPITAL ENCOUNTER (OUTPATIENT)
Dept: PHYSICAL THERAPY | Age: 74
Setting detail: THERAPIES SERIES
End: 2024-03-18
Payer: MEDICARE

## 2024-03-20 ENCOUNTER — APPOINTMENT (OUTPATIENT)
Dept: PHYSICAL THERAPY | Age: 74
End: 2024-03-20
Payer: MEDICARE

## 2024-03-26 ENCOUNTER — APPOINTMENT (OUTPATIENT)
Dept: PHYSICAL THERAPY | Age: 74
End: 2024-03-26
Payer: MEDICARE

## 2024-03-28 ENCOUNTER — HOSPITAL ENCOUNTER (OUTPATIENT)
Dept: PHYSICAL THERAPY | Age: 74
Setting detail: THERAPIES SERIES
Discharge: HOME | End: 2024-03-28
Payer: MEDICARE

## 2024-03-28 PROCEDURE — 97530 THERAPEUTIC ACTIVITIES: CPT

## 2024-03-28 PROCEDURE — 97110 THERAPEUTIC EXERCISES: CPT

## 2024-03-28 NOTE — PLAN OF CARE
goals listed.    [x] Progression is slowed due to complexities/Impairments listed.  [] Progression has been slowed due to co-morbidities.  [] Plan just implemented, too soon (<30days) to assess goals progression   [] Goals require adjustment due to lack of progress  [] Patient is not progressing as expected and requires additional follow up with physician  [] Other:     CHARGE CAPTURE     PT CHARGE GRID   CPT Code (TIMED) minutes # CPT Code (UNTIMED) #     Therex (21596)  30 2  EVAL:MODERATE (98046 - Typically 30 minutes face-to-face)     Neuromusc. Re-ed (80669)    Re-Eval (08718)     Manual (99244)    Estim Unattended (10170)     Ther. Act (98351) 15 1  Mech. Traction (07918)     Gait (35570)    Dry Needle 1-2 muscle (20560)     Aquatic Therex (06051)    Dry Needle 3+ muscle (20561)     Iontophoresis (72804)    VASO (42427)     Ultrasound (38307)    Group Therapy (97547)     Estim Attended (09756)    Canalith Repositioning (93878)     Other:    Other:    Total Timed Code Tx Minutes 45 3       Total Treatment Minutes 45        Charge Justification:  (91083) THERAPEUTIC EXERCISE - Provided verbal/tactile cueing for activities related to strengthening, flexibility, endurance, ROM performed to prevent loss of range of motion, maintain or improve muscular strength or increase flexibility, following either an injury or surgery.   (88089) HOME EXERCISE PROGRAM - Reviewed/Progressed HEP activities related to strengthening, flexibility, endurance, ROM performed to prevent loss of range of motion, maintain or improve muscular strength or increase flexibility, following either an injury or surgery.  (91354) THERAPEUTIC ACTIVITY - use of dynamic activities to improve functional performance. (Ex include squatting, ascending/descending stairs, walking, bending, lifting, catching, throwing, pushing, pulling, jumping.)  Direct, one on one contact, billed in 15-minute increments.    TREATMENT PLAN   Plan: Cont POC- Continue

## 2024-03-28 NOTE — PROGRESS NOTES
[facility]      CONSULTATION  [phone]  4/23/24  Referring: Dr. Lujan, Kaleb Guevara DO (PCP)    REASON FOR CONSULT/CHIEF COMPLAINT/HPI     Reason for visit/ Chief complaint  Atrial fibrillation  Hospital follow-up   HPI Antolin Pena is a 73 y.o. patient here for ongoing management of diastolic heart failure and atrial fibrillation.    He had diabetes, hypertension, a history of stroke, and is over 65 years of age, thus CHADS2-VASC score = 5.      At my last visit 11/3/2023 I determined that he should be considered for a Watchman device and I referred him to EP.  Unfortunately he never went to see EP, likely because he was admitted to the hospital on 11/7 with a large RP hematoma after falling off his tractor.    He has had 2 other admissions and an ER visit as well since the last visit.  In 2/2024 he was admittted to [facility] for chest pain and found to have flu A w/ pneumonia.  Lexiscan done at that time showed \"questionable\" reversibility in his apex with an LVEF of 26%.  Echo done showed LVEF of 30-35%.  He was very deconditioned and sent to rehab.  While in rehab, family came to visit, he had AMS, and ambulance took him to , where he was found to be in AF RVR and admitted.  He didn't respond to a dilt drip, but converted to NSR on amiodarone.  Shortly after this admission, he had an ER visit for a UTI.    Patient notes he is very wobbly when he walks and he thinks he needs a new left knee.  He has not seen orthopedics.      He never feels palpitations when in AF, but usually gets really short of breath when he is in it.  He was in hospital about 3 days at [facility] and converted back to NSR with diuresis.  He is on chronic propafenone and diltiazem to maintain NSR, and on anticoaguation.    I recommended he follow up with EP about a possible watchman and ablation but patient has not.  He had cataract surgery and recovered well from this.     Today he is here with his wife. He states that

## 2024-04-02 ENCOUNTER — HOSPITAL ENCOUNTER (OUTPATIENT)
Dept: PHYSICAL THERAPY | Age: 74
Setting detail: THERAPIES SERIES
Discharge: HOME | End: 2024-04-02
Payer: MEDICARE

## 2024-04-02 PROCEDURE — 97110 THERAPEUTIC EXERCISES: CPT

## 2024-04-02 NOTE — FLOWSHEET NOTE
activity limitations and would benefit from continued outpatient therapy services to address the deficits outlined in the patients goals  The patient has associated co-morbidities along with primary diagnosis which significantly impact the rate of recovery and contribute to complexities that require skilled therapeutic intervention    Treatment/Activity Tolerance:  [x] Patient tolerated treatment well [] Patient limited by fatique  [] Patient limited by pain  [] Patient limited by other medical complications  [] Other:     Return to Play: NA    Prognosis for POC: [x] Good [] Fair  [] Poor    Patient requires continued skilled intervention: [x] Yes  [] No      GOALS     Patient stated goal: \"get in and out of tractor, get stronger\"  Status: [] Progressing: [] Met: [x] Not Met: [] Adjusted     Therapist goals for Patient:   Short Term Goals: To be achieved in: 2 weeks  Independent in HEP and progression per patient tolerance, in order to progress toward full function and prevent re-injury.               Status: [] Progressing: [x] Met: [] Not Met: [] Adjusted  Patient will have a decrease in pain to 0/10 to help  facilitate improvement in movement, function, and ADLs as indicated by functional deficits.              Status: [] Progressing: [x] Met: [] Not Met: [] Adjusted     Long Term Goals: To be achieved in: 4-6 weeks  Disability index score of 52 or more for the Hardy Balance Scale to assist with return top prior level of function.                     Status: [x] Progressing: [] Met: [] Not Met: [] Adjusted  Improve knee AROM Flexion to WFL to allow for proper joint functioning as indicated by patients functional deficits.  Status: [] Progressing: [x] Met: [] Not Met: [] Adjusted  Pt to improve strength to 4+/5 or better of quadriceps to allow for proper muscle and joint use in functional mobility, ADLs and prior level of function  Status: [] Progressing: [] Met: [x] Not Met: [] Adjusted  Patient will return to

## 2024-04-04 ENCOUNTER — HOSPITAL ENCOUNTER (OUTPATIENT)
Dept: PHYSICAL THERAPY | Age: 74
Setting detail: THERAPIES SERIES
Discharge: HOME | End: 2024-04-04
Payer: MEDICARE

## 2024-04-04 PROCEDURE — 97110 THERAPEUTIC EXERCISES: CPT

## 2024-04-04 PROCEDURE — 97530 THERAPEUTIC ACTIVITIES: CPT

## 2024-04-04 NOTE — FLOWSHEET NOTE
Met: [] Adjusted    Overall Progression Towards Functional goals/ Treatment Progress Update:  [] Patient is progressing as expected towards functional goals listed.    [x] Progression is slowed due to complexities/Impairments listed.  [] Progression has been slowed due to co-morbidities.  [] Plan just implemented, too soon (<30days) to assess goals progression   [] Goals require adjustment due to lack of progress  [] Patient is not progressing as expected and requires additional follow up with physician  [] Other:     CHARGE CAPTURE     PT CHARGE GRID   CPT Code (TIMED) minutes # CPT Code (UNTIMED) #     Therex (32543)  35 2  EVAL:MODERATE (57868 - Typically 30 minutes face-to-face)     Neuromusc. Re-ed (28967)    Re-Eval (41752)     Manual (64548)    Estim Unattended (06022)     Ther. Act (46951) 8 1  Mech. Traction (24065)     Gait (39307)    Dry Needle 1-2 muscle (84836)     Aquatic Therex (51491)    Dry Needle 3+ muscle (20561)     Iontophoresis (55672)    VASO (67985)     Ultrasound (69245)    Group Therapy (82906)     Estim Attended (69721)    Canalith Repositioning (53679)     Other:    Other:    Total Timed Code Tx Minutes 43 3       Total Treatment Minutes 43        Charge Justification:  (14780) THERAPEUTIC EXERCISE - Provided verbal/tactile cueing for activities related to strengthening, flexibility, endurance, ROM performed to prevent loss of range of motion, maintain or improve muscular strength or increase flexibility, following either an injury or surgery.   (44910) HOME EXERCISE PROGRAM - Reviewed/Progressed HEP activities related to strengthening, flexibility, endurance, ROM performed to prevent loss of range of motion, maintain or improve muscular strength or increase flexibility, following either an injury or surgery.  (81893) THERAPEUTIC ACTIVITY - use of dynamic activities to improve functional performance. (Ex include squatting, ascending/descending stairs, walking, bending, lifting, catching,

## 2024-04-09 ENCOUNTER — HOSPITAL ENCOUNTER (OUTPATIENT)
Dept: PHYSICAL THERAPY | Age: 74
Setting detail: THERAPIES SERIES
Discharge: HOME | End: 2024-04-09
Payer: MEDICARE

## 2024-04-09 PROCEDURE — 97110 THERAPEUTIC EXERCISES: CPT

## 2024-04-09 NOTE — FLOWSHEET NOTE
[facility] - Outpatient Rehabilitation and Therapy [address] [place] [address] office: [phone] fax: [phone]         Physical Therapy: TREATMENT/PROGRESS NOTE   Patient: Antolin Pena (73 y.o. male)   Treatment Date: 2024   :  1950 MRN: [id number]   Visit #: 22   Insurance Allowable Auth Needed   medicare []Yes    [x]No    Insurance: Payor: MEDICARE / Plan: MEDICARE PART A AND B / Product Type: *No Product type* /   Insurance ID: [id number] - (Medicare)  Secondary Insurance (if applicable): [employer] [place]   Treatment Diagnosis:     ICD-10-CM    1. Weakness generalized  R53.1       2. Chronic pain of left knee  M25.562     G89.29          Medical Diagnosis:    Age-related physical debility [R54]   Referring Physician: Kinza Padilla MD  PCP: Kaleb Lujan DO                             Plan of care signed (Y/N): N    Date of Patient follow up with Physician:      Progress Report/POC: no  POC update due: (10 visits /OR AUTH LIMITS, whichever is less) 28 visits or 2024     Precautions/ Contra-indications:                                                                                          Latex allergy:  NO  Pacemaker:    NO  Contraindications for Manipulation: unhealthy/ multiple comorbidities   Date of Surgery: n/a  Other:     Preferred Language for Healthcare:   [x]English       []other:    SUBJECTIVE EXAMINATION     Patient Report/Comments: Pt reports that he went to the hospital Friday night with a bladder infection.  Antolin had an upset stomach over the weekend and has not had much of an appetite.  He still wants to do what he can today as he is trying to build strength. \"I go to the doctor this afternoon to check on my bladder injection.\"      Test used Initial score  23 POC  2024   Pain Summary VAS 0-2 0/10 010   Functional questionnaire LEFS and Nelson Balance Scale  LEFS:   NELSON/56 LEFS:

## 2024-04-11 ENCOUNTER — HOSPITAL ENCOUNTER (OUTPATIENT)
Dept: PHYSICAL THERAPY | Age: 74
Setting detail: THERAPIES SERIES
Discharge: HOME | End: 2024-04-11
Payer: MEDICARE

## 2024-04-11 PROCEDURE — 97110 THERAPEUTIC EXERCISES: CPT

## 2024-04-11 NOTE — FLOWSHEET NOTE
be able to go up and down 4 steps safely and without assist or supervision to mimic being able to get in and out of tractor.                                                                                                              Status: [] Progressing: [] Met: [x] Not Met: [] Adjusted    Overall Progression Towards Functional goals/ Treatment Progress Update:  [] Patient is progressing as expected towards functional goals listed.    [x] Progression is slowed due to complexities/Impairments listed.  [] Progression has been slowed due to co-morbidities.  [] Plan just implemented, too soon (<30days) to assess goals progression   [] Goals require adjustment due to lack of progress  [] Patient is not progressing as expected and requires additional follow up with physician  [] Other:     CHARGE CAPTURE     PT CHARGE GRID   CPT Code (TIMED) minutes # CPT Code (UNTIMED) #     Therex (72135)  32 2  EVAL:MODERATE (65259 - Typically 30 minutes face-to-face)     Neuromusc. Re-ed (96888) 3 0  Re-Eval (40935)     Manual (17926)    Estim Unattended (85704)     Ther. Act (83733)    Mech. Traction (36722)     Gait (91752)    Dry Needle 1-2 muscle (27574)     Aquatic Therex (60032)    Dry Needle 3+ muscle (20561)     Iontophoresis (04500)    VASO (29100)     Ultrasound (75798)    Group Therapy (11186)     Estim Attended (72248)    Canalith Repositioning (64418)     Other:    Other:    Total Timed Code Tx Minutes 35 2       Total Treatment Minutes 38        Charge Justification:  (31908) THERAPEUTIC EXERCISE - Provided verbal/tactile cueing for activities related to strengthening, flexibility, endurance, ROM performed to prevent loss of range of motion, maintain or improve muscular strength or increase flexibility, following either an injury or surgery.   (82197) HOME EXERCISE PROGRAM - Reviewed/Progressed HEP activities related to strengthening, flexibility, endurance, ROM performed to prevent loss of range of motion, maintain

## 2024-04-16 ENCOUNTER — HOSPITAL ENCOUNTER (OUTPATIENT)
Dept: PHYSICAL THERAPY | Age: 74
Setting detail: THERAPIES SERIES
Discharge: HOME | End: 2024-04-16
Payer: MEDICARE

## 2024-04-16 PROCEDURE — 97110 THERAPEUTIC EXERCISES: CPT

## 2024-04-16 NOTE — FLOWSHEET NOTE
[facility] - Outpatient Rehabilitation and Therapy [address] [place] [address] office: [phone] fax: [phone]         Physical Therapy: TREATMENT/PROGRESS NOTE   Patient: Antolin Pena (73 y.o. male)   Treatment Date: 2024   :  1950 MRN: [id number]   Visit #: 24   Insurance Allowable Auth Needed   medicare []Yes    [x]No    Insurance: Payor: MEDICARE / Plan: MEDICARE PART A AND B / Product Type: *No Product type* /   Insurance ID: [id number] - (Medicare)  Secondary Insurance (if applicable): [employer] OF [place]   Treatment Diagnosis:     ICD-10-CM    1. Weakness generalized  R53.1       2. Chronic pain of left knee  M25.562     G89.29          Medical Diagnosis:    Age-related physical debility [R54]   Referring Physician: Kinza Padilla MD  PCP: Kaleb Lujan DO                             Plan of care signed (Y/N): N    Date of Patient follow up with Physician:      Progress Report/POC: no  POC update due: (10 visits /OR AUTH LIMITS, whichever is less) 28 visits or 2024     Precautions/ Contra-indications:                                                                                          Latex allergy:  NO  Pacemaker:    NO  Contraindications for Manipulation: unhealthy/ multiple comorbidities   Date of Surgery: n/a  Other:     Preferred Language for Healthcare:   [x]English       []other:    SUBJECTIVE EXAMINATION     Patient Report/Comments: Pt reports that he is still on the meds for his bladder infection. \"I still don't feel clear-headed.\"  Antolin was able to climb on his riding mower and cut his grass on .  He tries to get up and do a few things around the house, \"but I can't do much.\"       Test used Initial score  23 POC  2024   Pain Summary VAS 0-2 0/10 010   Functional questionnaire LEFS and Nelson Balance Scale  LEFS:   NELSON/56 LEFS:      Other:                  OBJECTIVE EXAMINATION

## 2024-04-18 ENCOUNTER — HOSPITAL ENCOUNTER (OUTPATIENT)
Dept: PHYSICAL THERAPY | Age: 74
Setting detail: THERAPIES SERIES
Discharge: HOME | End: 2024-04-18
Payer: MEDICARE

## 2024-04-18 PROCEDURE — 97110 THERAPEUTIC EXERCISES: CPT

## 2024-04-18 NOTE — FLOWSHEET NOTE
activities to improve functional performance. (Ex include squatting, ascending/descending stairs, walking, bending, lifting, catching, throwing, pushing, pulling, jumping.)  Direct, one on one contact, billed in 15-minute increments.    TREATMENT PLAN   Plan: Cont POC- Continue emphasis/focus on exercise progression, improving proper muscle recruitment and activation/motor control patterns, and static and dynamic balance. Next visit plan to progress weights, progress reps, add new exercises, and progress balance per Pt yash.    Electronically Signed by Susy Everett PTA              Date: 04/18/2024     Note: If patient does not return for scheduled/recommended follow up visits, this note will serve as a discharge from care along with the most recent update on progress.

## 2024-04-22 ENCOUNTER — TELEPHONE (OUTPATIENT)
Dept: CARDIOLOGY CLINIC | Age: 74
End: 2024-04-22

## 2024-04-22 NOTE — TELEPHONE ENCOUNTER
Okay to cancel Echo per EITAN. Please call to advise. He does still need his appt at 1030 with EITAN. Thank you!

## 2024-04-23 ENCOUNTER — OFFICE VISIT (OUTPATIENT)
Dept: CARDIOLOGY CLINIC | Age: 74
End: 2024-04-23
Payer: MEDICARE

## 2024-04-23 ENCOUNTER — HOSPITAL ENCOUNTER (OUTPATIENT)
Dept: PHYSICAL THERAPY | Age: 74
Setting detail: THERAPIES SERIES
Discharge: HOME | End: 2024-04-23
Payer: MEDICARE

## 2024-04-23 VITALS
HEART RATE: 60 BPM | BODY MASS INDEX: 32.91 KG/M2 | HEIGHT: 72 IN | OXYGEN SATURATION: 95 % | SYSTOLIC BLOOD PRESSURE: 118 MMHG | DIASTOLIC BLOOD PRESSURE: 64 MMHG | WEIGHT: 243 LBS

## 2024-04-23 DIAGNOSIS — I10 ESSENTIAL HYPERTENSION: ICD-10-CM

## 2024-04-23 DIAGNOSIS — I48.11 LONGSTANDING PERSISTENT ATRIAL FIBRILLATION (HCC): Primary | ICD-10-CM

## 2024-04-23 DIAGNOSIS — I50.42 CHRONIC COMBINED SYSTOLIC AND DIASTOLIC CONGESTIVE HEART FAILURE (HCC): ICD-10-CM

## 2024-04-23 DIAGNOSIS — D50.0 IRON DEFICIENCY ANEMIA DUE TO CHRONIC BLOOD LOSS: ICD-10-CM

## 2024-04-23 DIAGNOSIS — R94.39 ABNORMAL NUCLEAR STRESS TEST: ICD-10-CM

## 2024-04-23 DIAGNOSIS — I25.10 CORONARY ARTERY CALCIFICATION SEEN ON CAT SCAN: ICD-10-CM

## 2024-04-23 DIAGNOSIS — R73.03 PREDIABETES: ICD-10-CM

## 2024-04-23 DIAGNOSIS — R29.6 FREQUENT FALLS: ICD-10-CM

## 2024-04-23 DIAGNOSIS — E78.5 HYPERLIPIDEMIA, UNSPECIFIED HYPERLIPIDEMIA TYPE: ICD-10-CM

## 2024-04-23 DIAGNOSIS — Z86.73 H/O: CVA (CEREBROVASCULAR ACCIDENT): ICD-10-CM

## 2024-04-23 DIAGNOSIS — M25.562 CHRONIC PAIN OF LEFT KNEE: ICD-10-CM

## 2024-04-23 DIAGNOSIS — N18.2 CKD (CHRONIC KIDNEY DISEASE) STAGE 2, GFR 60-89 ML/MIN: ICD-10-CM

## 2024-04-23 DIAGNOSIS — G89.29 CHRONIC PAIN OF LEFT KNEE: ICD-10-CM

## 2024-04-23 PROCEDURE — 3017F COLORECTAL CA SCREEN DOC REV: CPT | Performed by: INTERNAL MEDICINE

## 2024-04-23 PROCEDURE — 93000 ELECTROCARDIOGRAM COMPLETE: CPT | Performed by: INTERNAL MEDICINE

## 2024-04-23 PROCEDURE — 97110 THERAPEUTIC EXERCISES: CPT

## 2024-04-23 PROCEDURE — 1036F TOBACCO NON-USER: CPT | Performed by: INTERNAL MEDICINE

## 2024-04-23 PROCEDURE — 1123F ACP DISCUSS/DSCN MKR DOCD: CPT | Performed by: INTERNAL MEDICINE

## 2024-04-23 PROCEDURE — 3074F SYST BP LT 130 MM HG: CPT | Performed by: INTERNAL MEDICINE

## 2024-04-23 PROCEDURE — 3078F DIAST BP <80 MM HG: CPT | Performed by: INTERNAL MEDICINE

## 2024-04-23 PROCEDURE — G8427 DOCREV CUR MEDS BY ELIG CLIN: HCPCS | Performed by: INTERNAL MEDICINE

## 2024-04-23 PROCEDURE — G8417 CALC BMI ABV UP PARAM F/U: HCPCS | Performed by: INTERNAL MEDICINE

## 2024-04-23 PROCEDURE — 99215 OFFICE O/P EST HI 40 MIN: CPT | Performed by: INTERNAL MEDICINE

## 2024-04-23 RX ORDER — ATORVASTATIN CALCIUM 10 MG/1
TABLET, FILM COATED ORAL
COMMUNITY
Start: 2024-04-09

## 2024-04-23 RX ORDER — AMIODARONE HYDROCHLORIDE 200 MG/1
200 TABLET ORAL 2 TIMES DAILY
COMMUNITY
Start: 2024-03-01 | End: 2025-03-01

## 2024-04-23 RX ORDER — METOPROLOL SUCCINATE 25 MG/1
25 TABLET, EXTENDED RELEASE ORAL DAILY
COMMUNITY

## 2024-04-23 RX ORDER — POTASSIUM CHLORIDE 20 MEQ/1
20 TABLET, EXTENDED RELEASE ORAL DAILY
COMMUNITY
Start: 2024-03-01 | End: 2025-03-01

## 2024-04-23 RX ORDER — METOPROLOL SUCCINATE 100 MG/1
100 TABLET, EXTENDED RELEASE ORAL 2 TIMES DAILY
COMMUNITY
Start: 2024-03-01 | End: 2024-04-23

## 2024-04-23 RX ORDER — DILTIAZEM HYDROCHLORIDE 180 MG/1
CAPSULE, EXTENDED RELEASE ORAL
COMMUNITY
Start: 2024-03-27

## 2024-04-23 NOTE — PATIENT INSTRUCTIONS
Follow up with Dr Rutledge in 1 month     Echo soon     Referral to Orthopedics     Referral to EP for possible Watchman procedure     Call for any questions or concerns.

## 2024-04-23 NOTE — FLOWSHEET NOTE
therapy services to address the deficits outlined in the patients goals  The patient has associated co-morbidities along with primary diagnosis which significantly impact the rate of recovery and contribute to complexities that require skilled therapeutic intervention    Treatment/Activity Tolerance:  [x] Patient tolerated treatment well [] Patient limited by fatique  [] Patient limited by pain  [] Patient limited by other medical complications  [] Other:     Return to Play: NA    Prognosis for POC: [x] Good [] Fair  [] Poor    Patient requires continued skilled intervention: [x] Yes  [] No      GOALS     Patient stated goal: \"get in and out of tractor, get stronger\"  Status: [] Progressing: [] Met: [x] Not Met: [] Adjusted     Therapist goals for Patient:   Short Term Goals: To be achieved in: 2 weeks  Independent in HEP and progression per patient tolerance, in order to progress toward full function and prevent re-injury.               Status: [] Progressing: [x] Met: [] Not Met: [] Adjusted  Patient will have a decrease in pain to 0/10 to help  facilitate improvement in movement, function, and ADLs as indicated by functional deficits.              Status: [] Progressing: [x] Met: [] Not Met: [] Adjusted     Long Term Goals: To be achieved in: 4-6 weeks  Disability index score of 52 or more for the Hardy Balance Scale to assist with return top prior level of function.                     Status: [x] Progressing: [] Met: [] Not Met: [] Adjusted  Improve knee AROM Flexion to WFL to allow for proper joint functioning as indicated by patients functional deficits.  Status: [] Progressing: [x] Met: [] Not Met: [] Adjusted  Pt to improve strength to 4+/5 or better of quadriceps to allow for proper muscle and joint use in functional mobility, ADLs and prior level of function  Status: [] Progressing: [] Met: [x] Not Met: [] Adjusted  Patient will return to putting shoes/socks on without increased symptoms or restriction to

## 2024-04-25 ENCOUNTER — HOSPITAL ENCOUNTER (OUTPATIENT)
Dept: PHYSICAL THERAPY | Age: 74
Setting detail: THERAPIES SERIES
Discharge: HOME | End: 2024-04-25
Payer: MEDICARE

## 2024-04-25 ENCOUNTER — TELEPHONE (OUTPATIENT)
Dept: CARDIOLOGY CLINIC | Age: 74
End: 2024-04-25

## 2024-04-25 PROCEDURE — 97110 THERAPEUTIC EXERCISES: CPT

## 2024-04-25 NOTE — TELEPHONE ENCOUNTER
Spoke with pt he needed to check his calendar, pt stated he will call back to make an appointment when he know his availability.

## 2024-04-25 NOTE — TELEPHONE ENCOUNTER
I48.11 (ICD-10-CM) - Longstanding persistent atrial fibrillation (HCC)    Dr. Rutledge Patient, referring to EP. Please advise appt to offer patient. First avail is currently: June 25    Thank you.

## 2024-04-25 NOTE — FLOWSHEET NOTE
activities related to strengthening, flexibility, endurance, ROM performed to prevent loss of range of motion, maintain or improve muscular strength or increase flexibility, following either an injury or surgery.  (91893) THERAPEUTIC ACTIVITY - use of dynamic activities to improve functional performance. (Ex include squatting, ascending/descending stairs, walking, bending, lifting, catching, throwing, pushing, pulling, jumping.)  Direct, one on one contact, billed in 15-minute increments.    TREATMENT PLAN   Plan: Cont POC- Continue emphasis/focus on exercise progression, improving proper muscle recruitment and activation/motor control patterns, and static and dynamic balance. Next visit plan to progress weights, progress reps, add new exercises, and progress balance per Pt yash.    Electronically Signed by Susy Everett, PTA              Date: 04/25/2024     Note: If patient does not return for scheduled/recommended follow up visits, this note will serve as a discharge from care along with the most recent update on progress.

## 2024-04-26 ENCOUNTER — PROCEDURE VISIT (OUTPATIENT)
Dept: CARDIOLOGY CLINIC | Age: 74
End: 2024-04-26

## 2024-04-26 DIAGNOSIS — R94.39 ABNORMAL NUCLEAR STRESS TEST: ICD-10-CM

## 2024-04-26 DIAGNOSIS — I48.11 LONGSTANDING PERSISTENT ATRIAL FIBRILLATION (HCC): ICD-10-CM

## 2024-04-26 NOTE — RESULT ENCOUNTER NOTE
FYI - discussed with patient already immediately after test finished    Will hold off on getting a stress test unless he has new symptoms and/or there are plans for knee surgery

## 2024-04-30 ENCOUNTER — HOSPITAL ENCOUNTER (OUTPATIENT)
Dept: PHYSICAL THERAPY | Age: 74
Setting detail: THERAPIES SERIES
Discharge: HOME | End: 2024-04-30
Payer: MEDICARE

## 2024-04-30 PROCEDURE — 97110 THERAPEUTIC EXERCISES: CPT

## 2024-04-30 NOTE — FLOWSHEET NOTE
[facility] - Outpatient Rehabilitation and Therapy [address] [place] [address] office: [phone] fax: [phone]         Physical Therapy: TREATMENT/PROGRESS NOTE   Patient: Antolin Pena (73 y.o. male)   Treatment Date: 2024   :  1950 MRN: [id number]   Visit #: 28   Insurance Allowable Auth Needed   medicare []Yes    [x]No    Insurance: Payor: MEDICARE / Plan: MEDICARE PART A AND B / Product Type: *No Product type* /   Insurance ID: [id number] - (Medicare)  Secondary Insurance (if applicable): [employer] OF [place]   Treatment Diagnosis:     ICD-10-CM    1. Weakness generalized  R53.1       2. Chronic pain of left knee  M25.562     G89.29          Medical Diagnosis:    Age-related physical debility [R54]   Referring Physician: Kinza Padilla MD  PCP: Kaleb Lujan DO                             Plan of care signed (Y/N): N    Date of Patient follow up with Physician:      Progress Report/POC: no  POC update due: (10 visits /OR AUTH LIMITS, whichever is less) 28 visits or 2024     Precautions/ Contra-indications:                                                                                          Latex allergy:  NO  Pacemaker:    NO  Contraindications for Manipulation: unhealthy/ multiple comorbidities   Date of Surgery: n/a  Other:     Preferred Language for Healthcare:   [x]English       []other:    SUBJECTIVE EXAMINATION     Patient Report/Comments:  Pt reports that his back is feeling sore. \"It started on Saturday.\"  \"I don't have much energy.\"       Test used Initial score  23 POC  2024   Pain Summary VAS 0-2 0/10 0/10   Functional questionnaire LEFS and Nelson Balance Scale  LEFS:   NELSON/56 LEFS:      Other:                  OBJECTIVE EXAMINATION     Observation: Pt unable to perform sit to stand today without UE support from chair back placed in front of Pt.     Test measurements:     3/28/2024  TUG:

## 2024-05-01 ENCOUNTER — OFFICE VISIT (OUTPATIENT)
Dept: ORTHOPEDIC SURGERY | Age: 74
End: 2024-05-01
Payer: MEDICARE

## 2024-05-01 VITALS — HEIGHT: 72 IN | WEIGHT: 243 LBS | BODY MASS INDEX: 32.91 KG/M2

## 2024-05-01 DIAGNOSIS — M25.362 KNEE INSTABILITY, LEFT: ICD-10-CM

## 2024-05-01 DIAGNOSIS — M25.561 RIGHT KNEE PAIN, UNSPECIFIED CHRONICITY: ICD-10-CM

## 2024-05-01 DIAGNOSIS — M25.562 LEFT KNEE PAIN, UNSPECIFIED CHRONICITY: Primary | ICD-10-CM

## 2024-05-01 PROCEDURE — 99204 OFFICE O/P NEW MOD 45 MIN: CPT | Performed by: ORTHOPAEDIC SURGERY

## 2024-05-01 PROCEDURE — 1036F TOBACCO NON-USER: CPT | Performed by: ORTHOPAEDIC SURGERY

## 2024-05-01 PROCEDURE — G8427 DOCREV CUR MEDS BY ELIG CLIN: HCPCS | Performed by: ORTHOPAEDIC SURGERY

## 2024-05-01 PROCEDURE — G8417 CALC BMI ABV UP PARAM F/U: HCPCS | Performed by: ORTHOPAEDIC SURGERY

## 2024-05-01 PROCEDURE — 3017F COLORECTAL CA SCREEN DOC REV: CPT | Performed by: ORTHOPAEDIC SURGERY

## 2024-05-01 PROCEDURE — 1123F ACP DISCUSS/DSCN MKR DOCD: CPT | Performed by: ORTHOPAEDIC SURGERY

## 2024-05-01 PROCEDURE — L1812 KO ELASTIC W/JOINTS PRE OTS: HCPCS | Performed by: ORTHOPAEDIC SURGERY

## 2024-05-02 ENCOUNTER — APPOINTMENT (OUTPATIENT)
Dept: PHYSICAL THERAPY | Age: 74
End: 2024-05-02
Payer: MEDICARE

## 2024-05-07 ENCOUNTER — HOSPITAL ENCOUNTER (OUTPATIENT)
Dept: PHYSICAL THERAPY | Age: 74
Setting detail: THERAPIES SERIES
Discharge: HOME | End: 2024-05-07
Payer: MEDICARE

## 2024-05-07 PROCEDURE — 97110 THERAPEUTIC EXERCISES: CPT

## 2024-05-07 NOTE — PLAN OF CARE
[facility] - Outpatient Rehabilitation and Therapy [address] [place] [address] office: [phone] fax: [phone]           Physical Therapy Re-Certification Plan of Care/MD UPDATE      Dear  Dr. Kinza Padilla,    We had the pleasure of treating the following patient for physical therapy services at [facility].  A summary of our findings can be found in the updated assessment below.  This includes our plan of care.  If you have any questions or concerns regarding these findings, please do not hesitate to contact me at the office phone number checked above.  Thank you for the referral.     Physician Signature:________________________________Date:__________________  By signing above (or electronic signature), therapist’s plan is approved by physician      Current Functional Status:   Antolin Pena 1950 continues to present with functional deficits in strength symmetry, endurance of strength, cardiovascular endurance, and eccentric control  limiting ability with walking on uneven ground, managing community ambulation, walking up/down stairs, navigate curbs/steps, don/doff shoes/socks, ADLs, heavy home activity, and yardwork .  During therapy this date, patient required verbal cueing, tactile cueing, muscle facilitation, and progression of exercises and program for exercise progression, improving proper muscle recruitment and activation/motor control patterns, static and dynamic balance, kinesthetic sense and proprioception, and improving postural awareness. Patient will continue to benefit from ongoing evaluation and advanced clinical decision from a Physical Therapist to improve muscle strength, endurance, normalization of gait, balance and proprioception, ADL status, and tolerance to work activity to safely return to PLOF and work/work related tasks without symptoms or restrictions.    Overall Response to Treatment:   []Patient is responding well to

## 2024-05-09 ENCOUNTER — HOSPITAL ENCOUNTER (OUTPATIENT)
Dept: PHYSICAL THERAPY | Age: 74
Setting detail: THERAPIES SERIES
Discharge: HOME | End: 2024-05-09
Payer: MEDICARE

## 2024-05-09 PROCEDURE — 97110 THERAPEUTIC EXERCISES: CPT

## 2024-05-09 PROCEDURE — 97530 THERAPEUTIC ACTIVITIES: CPT

## 2024-05-09 NOTE — FLOWSHEET NOTE
Seated hamstring pull green band, foot on slider 2 10 bilat          Seated hip abd as chair allows Small blue 3 10    Seated march  6 in 2 10 Challenging LLE to clear box edge   Leg press 75# 3 8-10 Cues to prevent lock out of knee; progress weight as able   Seated HR/TR 2#; 2in box under toes 2 8-10    Standing hip ext c assist from 1/2 wall       Standing marches  2 10 1/2 wall, level surface   Standing hip abd kicks at wall  2 5 No wt, UE support at 1/2 wall   Seated band rows Blue tube 2 15    Seated alt UE band punch Blue tube 2 10    TKE    3pl, cane in R hand   Standing HS curl 3#      Step ups  2 10 4in box at 1/2 wall   side stepping Red band @ shins   Fatigue by end, cane with Pt for balance assist as needed, SBA   BOSU Lunges Wall assist for UE as needed   2x10 each leg   Squats at wall    UE assist as needed   Hip extensions resting on 1/2 wall  2 10    Manual Intervention (73199)  TIME                                        NMR re-education (58596) x resistance Sets/time Reps CUES NEEDED   Cone step over - lateral  Rest break for fatigue, SOB   Marches Hands at wall Cues for minimizing lateral trunk lean                        Therapeutic Activity (12760) x 6  Sets/time     Functional assessment  0 min     STS Chair + 2 airex 2 10 7 reps unassisted, 3 assisted c chair in front   Step ups baljeet 8in box, hands at wall- challenging                   Modalities:    No modalities applied this session    Education/Home Exercise Program: Not enough time for HEP instruction this visit.  Plan to address at follow up.      ASSESSMENT     Today's Assessment: Patient had good tolerance to today's session, reporting appropriate fatigue and muscular fatigue with program completed. Able to progress  intensity, resistance, and exercise difficulty on sit to stands, leg press, marches. Continues to display deficits in weakness, decreased dynamic stabilty, decreased NM control, and decreased balance control which

## 2024-05-14 ENCOUNTER — APPOINTMENT (OUTPATIENT)
Dept: PHYSICAL THERAPY | Age: 74
End: 2024-05-14
Payer: MEDICARE

## 2024-05-14 NOTE — PROGRESS NOTES
5/1/2024     Reason for visit:  Left knee pain    History of Present Illness:  The patient is a 73-year-old male who presents for evaluation of his left knee.  He reports he previously had some pain in his knee.  However now he reports no pain.  He does report a sense of the knee giving out.    Medical History:  Past Medical History:   Diagnosis Date    COPD (chronic obstructive pulmonary disease) (HCC)     Diabetes mellitus (HCC)     Edema       No past surgical history on file.   Family History   Problem Relation Age of Onset    No Known Problems Mother     Heart Attack Father       Social History     Socioeconomic History    Marital status:      Spouse name: Not on file    Number of children: Not on file    Years of education: Not on file    Highest education level: Not on file   Occupational History    Not on file   Tobacco Use    Smoking status: Never    Smokeless tobacco: Never   Vaping Use    Vaping Use: Never used   Substance and Sexual Activity    Alcohol use: Not Currently    Drug use: Not on file    Sexual activity: Not on file   Other Topics Concern    Not on file   Social History Narrative    Not on file     Social Determinants of Health     Financial Resource Strain: Not on file   Food Insecurity: Not on file   Transportation Needs: Not on file   Physical Activity: Not on file   Stress: Not on file   Social Connections: Not on file   Intimate Partner Violence: Not on file   Housing Stability: Not on file      Current Outpatient Medications on File Prior to Visit   Medication Sig Dispense Refill    DILT- MG extended release capsule       amiodarone (CORDARONE) 200 MG tablet Take 1 tablet by mouth 2 times daily Patient taking 1/2 tab BID      atorvastatin (LIPITOR) 10 MG tablet       potassium chloride (KLOR-CON M) 20 MEQ extended release tablet Take 1 tablet by mouth daily      metoprolol succinate (TOPROL XL) 25 MG extended release tablet Take 1 tablet by mouth daily      pioglitazone

## 2024-05-16 ENCOUNTER — HOSPITAL ENCOUNTER (OUTPATIENT)
Dept: PHYSICAL THERAPY | Age: 74
Setting detail: THERAPIES SERIES
Discharge: HOME | End: 2024-05-16
Payer: MEDICARE

## 2024-05-16 PROCEDURE — 97110 THERAPEUTIC EXERCISES: CPT

## 2024-05-16 NOTE — FLOWSHEET NOTE
score of 52 or more for the Hardy Balance Scale to assist with return top prior level of function.                     Status: [x] Progressing: [] Met: [] Not Met: [] Adjusted  Improve knee AROM Flexion to WFL to allow for proper joint functioning as indicated by patients functional deficits.  Status: [] Progressing: [x] Met: [] Not Met: [] Adjusted  Pt to improve strength to 4+/5 or better of quadriceps to allow for proper muscle and joint use in functional mobility, ADLs and prior level of function  Status: [] Progressing: [x] Met: [] Not Met: [] Adjusted  Patient will return to putting shoes/socks on without increased symptoms or restriction to work towards return to prior level of function.  Status: [] Progressing: [x] Met: [] Not Met: [] Adjusted  Pt will be able to go up and down 4 steps safely and without assist or supervision to mimic being able to get in and out of tractor.                                                                                                              Status: [] Progressing: [] Met: [x] Not Met: [] Adjusted    Overall Progression Towards Functional goals/ Treatment Progress Update:  [] Patient is progressing as expected towards functional goals listed.    [x] Progression is slowed due to complexities/Impairments listed.  [] Progression has been slowed due to co-morbidities.  [] Plan just implemented, too soon (<30days) to assess goals progression   [] Goals require adjustment due to lack of progress  [] Patient is not progressing as expected and requires additional follow up with physician  [] Other:     CHARGE CAPTURE     PT CHARGE GRID   CPT Code (TIMED) minutes # CPT Code (UNTIMED) #     Therex (85857)  25 2  EVAL:MODERATE (65869 - Typically 30 minutes face-to-face)     Neuromusc. Re-ed (04759)    Re-Eval (56847)     Manual (29355)    Estim Unattended (01694)     Ther. Act (99499)    [facility]. Traction (58895)     Gait (13511)    Dry Needle 1-2 muscle (43062)     Aquatic Therex

## 2024-05-21 ENCOUNTER — APPOINTMENT (OUTPATIENT)
Dept: PHYSICAL THERAPY | Age: 74
End: 2024-05-21
Payer: MEDICARE

## 2024-05-23 ENCOUNTER — HOSPITAL ENCOUNTER (OUTPATIENT)
Dept: PHYSICAL THERAPY | Age: 74
Setting detail: THERAPIES SERIES
Discharge: HOME | End: 2024-05-23
Payer: MEDICARE

## 2024-05-23 PROCEDURE — 97110 THERAPEUTIC EXERCISES: CPT

## 2024-05-23 PROCEDURE — 97530 THERAPEUTIC ACTIVITIES: CPT

## 2024-05-23 NOTE — FLOWSHEET NOTE
[facility] - Outpatient Rehabilitation and Therapy [address] [place] [address] office: [phone] fax: [phone]         Physical Therapy: TREATMENT/PROGRESS NOTE   Patient: Antolin Pena (73 y.o. male)   Treatment Date: 2024   :  1950 MRN: [id number]   Visit #: 32   Insurance Allowable Auth Needed   medicare []Yes    [x]No    Insurance: Payor: MEDICARE / Plan: MEDICARE PART A AND B / Product Type: *No Product type* /   Insurance ID: [id number] - (Medicare)  Secondary Insurance (if applicable): [employer] OF [place]   Treatment Diagnosis:     ICD-10-CM    1. Weakness generalized  R53.1       2. Chronic pain of left knee  M25.562     G89.29          Medical Diagnosis:    Age-related physical debility [R54]   Referring Physician: Kinza Padilla MD  PCP: Kaleb Lujan DO                             Plan of care signed (Y/N): N    Date of Patient follow up with Physician:      Progress Report/POC: no   POC update due: (10 visits /OR AUTH LIMITS, whichever is less) 28 visits or 2024     Precautions/ Contra-indications:                                                                                          Latex allergy:  NO  Pacemaker:    NO  Contraindications for Manipulation: unhealthy/ multiple comorbidities   Date of Surgery: n/a  Other:     Preferred Language for Healthcare:   [x]English       []other:    SUBJECTIVE EXAMINATION     Patient Report/Comments:  Pt states that he was trying to change his pants while standing and lost his balance and fell.  Antolin states that he has a couple of sore spots below his L knee from his fall. \"I haven't been wearing my brace because it rubs those sore spots.\"  \"Overall I feel like I have more energy today.\"         Test used Initial score  23 POC  2024   Pain Summary VAS 0-2 0/10 0/10 currently   Functional questionnaire LEFS and Nelson Balance Scale  LEFS:   NELSON/56 LEFS:

## 2024-05-28 ENCOUNTER — HOSPITAL ENCOUNTER (OUTPATIENT)
Dept: PHYSICAL THERAPY | Age: 74
Setting detail: THERAPIES SERIES
Discharge: HOME | End: 2024-05-28
Payer: MEDICARE

## 2024-05-28 PROCEDURE — 97140 MANUAL THERAPY 1/> REGIONS: CPT

## 2024-05-28 PROCEDURE — 97110 THERAPEUTIC EXERCISES: CPT

## 2024-05-28 NOTE — FLOWSHEET NOTE
NT)     Mvmt (norm) AROM L AROM R Notes PROM L PROM R Notes         HIP Flex (120)                Abd (45)                ER (50)                IR (45)                Ext (20)                                 KNEE Flex (140) 100 100 Measured in sitting          Ext (0) 0 0                                ANKLE DF (20)                PF (50)                Inversion (30)                Eversion (20)                      MMT L MMT R Notes         HIP Flexion          Abduction         KNEE Flexion 33.2lbs 37.9lbs      Extension 32.7lbs 33.8lbs          3/28/2024  TU.72s     ROM/Strength: (Blank cells denote NT)     Mvmt (norm) AROM L AROM R Notes PROM L PROM R Notes                        LUMBAR Flex (90)              Ext (25)              SB (25)                Rotation (30)                                       HIP Flex (120)                Abd (45)                ER (50)                IR (45)                Ext (20)                                 KNEE Flex (140) 100 100 Measured in sitting          Ext (0) -5 0                                ANKLE DF (20)                PF (50)                Inversion (30)                Eversion (20)                      MMT L MMT R Notes         HIP Flexion          Abduction          ER          IR          Extension                     KNEE Flexion 3+/5 4/5      Extension 3-/5 4/5 Uanble to achieve full AROM     Exercises/Interventions:     Therapeutic Ex (51006)  resistance Sets/time Reps Notes/Cues/Progressions   Functional assessment  0 min  See above   LAQ 3# 2 8-10    bridge  1 10 table   LTR  1 10 Pauses at each side   SKTC with towel behind knee  5sec 10    EOB hip ext  2 10 Alternating sides, hold at top of available range, no wt   Cybex HS curl 15-20# 2 8-10 Seat 4   Cybex leg ext 15-20# 2 8-10 Seat 3   bike    Add NV   Adduction ball squeeze  2 10 Orange unweighted ball   Seated clamshell maroon 3 10    Seated hamstring pull green band, foot on slider 2 10

## 2024-05-30 ENCOUNTER — HOSPITAL ENCOUNTER (OUTPATIENT)
Dept: PHYSICAL THERAPY | Age: 74
Setting detail: THERAPIES SERIES
Discharge: HOME | End: 2024-05-30
Payer: MEDICARE

## 2024-05-30 PROCEDURE — 97530 THERAPEUTIC ACTIVITIES: CPT

## 2024-05-30 PROCEDURE — 97110 THERAPEUTIC EXERCISES: CPT

## 2024-05-30 NOTE — FLOWSHEET NOTE
[facility] - Outpatient Rehabilitation and Therapy [address] [place] [address] office: [phone] fax: [phone]         Physical Therapy: TREATMENT/PROGRESS NOTE   Patient: Antolin Pena (73 y.o. male)   Treatment Date: 2024   :  1950 MRN: [id number]   Visit #: 34   Insurance Allowable Auth Needed   medicare []Yes    [x]No    Insurance: Payor: MEDICARE / Plan: MEDICARE PART A AND B / Product Type: *No Product type* /   Insurance ID: [id number] - (Medicare)  Secondary Insurance (if applicable): [employer] OF [place]   Treatment Diagnosis:     ICD-10-CM    1. Weakness generalized  R53.1       2. Chronic pain of left knee  M25.562     G89.29          Medical Diagnosis:    Age-related physical debility [R54]   Referring Physician: Kinza Padilla MD  PCP: Kaleb Lujan DO                             Plan of care signed (Y/N): N    Date of Patient follow up with Physician:      Progress Report/POC: no   POC update due: (10 visits /OR AUTH LIMITS, whichever is less) 28 visits or 2024     Precautions/ Contra-indications:                                                                                          Latex allergy:  NO  Pacemaker:    NO  Contraindications for Manipulation: unhealthy/ multiple comorbidities   Date of Surgery: n/a  Other:     Preferred Language for Healthcare:   [x]English       []other:    SUBJECTIVE EXAMINATION     Patient Report/Comments:  Pt states that his shoulders aren't as sore as they were a week or two ago.  \"I'm anemic, so they increased my iron.\" Antolin states that he does not need to use his breathing treatments as frequently right now.     Test used Initial score  23 POC  2024   Pain Summary VAS 0-2 0/10 0/10 currently   Functional questionnaire LEFS and Nelson Balance Scale  LEFS:   NELSON/56 LEFS:      Other:                  OBJECTIVE EXAMINATION     Observation: Pt wearing L knee

## 2024-06-04 ENCOUNTER — HOSPITAL ENCOUNTER (OUTPATIENT)
Dept: PHYSICAL THERAPY | Age: 74
Setting detail: THERAPIES SERIES
Discharge: HOME | End: 2024-06-04
Payer: MEDICARE

## 2024-06-04 PROCEDURE — 97110 THERAPEUTIC EXERCISES: CPT

## 2024-06-04 NOTE — PLAN OF CARE
Met: [] Adjusted  Patient will have a decrease in pain to 0/10 to help  facilitate improvement in movement, function, and ADLs as indicated by functional deficits.              Status: [] Progressing: [x] Met: [] Not Met: [] Adjusted     Long Term Goals: To be achieved in: 4-6 weeks  Disability index score of 52 or more for the Hardy Balance Scale to assist with return top prior level of function.                     Status: [x] Progressing: [] Met: [] Not Met: [] Adjusted  Improve knee AROM Flexion to WFL to allow for proper joint functioning as indicated by patients functional deficits.  Status: [] Progressing: [x] Met: [] Not Met: [] Adjusted  Pt to improve strength to 4+/5 or better of quadriceps to allow for proper muscle and joint use in functional mobility, ADLs and prior level of function  Status: [] Progressing: [x] Met: [] Not Met: [] Adjusted  Patient will return to putting shoes/socks on without increased symptoms or restriction to work towards return to prior level of function.  Status: [] Progressing: [x] Met: [] Not Met: [] Adjusted  Pt will be able to go up and down 4 8inch steps safely and without assist or supervision to mimic being able to get in and out of tractor.                                                                                                              Status: [] Progressing: [] Met: [] Not Met: [x] Adjusted    Overall Progression Towards Functional goals/ Treatment Progress Update:  [] Patient is progressing as expected towards functional goals listed.    [x] Progression is slowed due to complexities/Impairments listed.  [] Progression has been slowed due to co-morbidities.  [] Plan just implemented, too soon (<30days) to assess goals progression   [] Goals require adjustment due to lack of progress  [] Patient is not progressing as expected and requires additional follow up with physician  [] Other:     CHARGE CAPTURE     PT CHARGE GRID   CPT Code (TIMED) minutes # CPT Code  Dutasteride Male Counseling: Dustasteride Counseling:  I discussed with the patient the risks of use of dutasteride including but not limited to decreased libido, decreased ejaculate volume, and gynecomastia. Women who can become pregnant should not handle medication.  All of the patient's questions and concerns were addressed. Dutasteride Counseling: Dustasteride Counseling:  I discussed with the patient the risks of use of dutasteride including but not limited to decreased libido, decreased ejaculate volume, and gynecomastia. Women who can become pregnant should not handle medication.  All of the patient's questions and concerns were addressed.

## 2024-06-06 ENCOUNTER — HOSPITAL ENCOUNTER (OUTPATIENT)
Dept: PHYSICAL THERAPY | Age: 74
Setting detail: THERAPIES SERIES
Discharge: HOME | End: 2024-06-06
Payer: MEDICARE

## 2024-06-06 PROCEDURE — 97530 THERAPEUTIC ACTIVITIES: CPT

## 2024-06-06 PROCEDURE — 97110 THERAPEUTIC EXERCISES: CPT

## 2024-06-06 NOTE — FLOWSHEET NOTE
control, and decreased balance control which required ongoing skilled physical therapy and decision making.  and Antloin continues to have improved endurance and tolerance to functional strengthening.  Improved endurance with step ups and sit to stands.  Fewer rest breaks needed today. Pt has demonstrated improvement in general quad and HS strength and improving functional outcome score.      Medical Necessity Documentation:  I certify that this patient meets the below criteria necessary for medical necessity for care and/or justification of therapy services:  The patient has functional impairments and/or activity limitations and would benefit from continued outpatient therapy services to address the deficits outlined in the patients goals  The patient has associated co-morbidities along with primary diagnosis which significantly impact the rate of recovery and contribute to complexities that require skilled therapeutic intervention    Treatment/Activity Tolerance:  [x] Patient tolerated treatment well [] Patient limited by fatique  [] Patient limited by pain  [] Patient limited by other medical complications  [] Other:     Return to Play: NA    Prognosis for POC: [] Good [x] Fair  [] Poor    Patient requires continued skilled intervention: [x] Yes  [] No      GOALS     Patient stated goal: \"get in and out of tractor, get stronger\"  Status: [] Progressing: [] Met: [x] Not Met: [] Adjusted     Therapist goals for Patient:   Short Term Goals: To be achieved in: 2 weeks  Independent in HEP and progression per patient tolerance, in order to progress toward full function and prevent re-injury.               Status: [] Progressing: [x] Met: [] Not Met: [] Adjusted  Patient will have a decrease in pain to 0/10 to help  facilitate improvement in movement, function, and ADLs as indicated by functional deficits.              Status: [] Progressing: [x] Met: [] Not Met: [] Adjusted     Long Term Goals: To be achieved in: 4-6  complains of pain/discomfort Z Plasty Text: The lesion was extirpated to the level of the fat with a #15 scalpel blade.  Given the location of the defect, shape of the defect and the proximity to free margins a Z-plasty was deemed most appropriate for repair.  Using a sterile surgical marker, the appropriate transposition arms of the Z-plasty were drawn incorporating the defect and placing the expected incisions within the relaxed skin tension lines where possible.    The area thus outlined was incised deep to adipose tissue with a #15 scalpel blade.  The skin margins were undermined to an appropriate distance in all directions utilizing iris scissors.  The opposing transposition arms were then transposed into place in opposite direction and anchored with interrupted buried subcutaneous sutures.

## 2024-06-11 ENCOUNTER — APPOINTMENT (OUTPATIENT)
Dept: PHYSICAL THERAPY | Age: 74
End: 2024-06-11
Payer: MEDICARE

## 2024-06-12 ENCOUNTER — OFFICE VISIT (OUTPATIENT)
Dept: CARDIOLOGY CLINIC | Age: 74
End: 2024-06-12
Payer: MEDICARE

## 2024-06-12 VITALS
SYSTOLIC BLOOD PRESSURE: 116 MMHG | BODY MASS INDEX: 34.29 KG/M2 | DIASTOLIC BLOOD PRESSURE: 64 MMHG | OXYGEN SATURATION: 95 % | WEIGHT: 253.2 LBS | HEIGHT: 72 IN | HEART RATE: 63 BPM

## 2024-06-12 DIAGNOSIS — I48.11 LONGSTANDING PERSISTENT ATRIAL FIBRILLATION (HCC): Primary | ICD-10-CM

## 2024-06-12 DIAGNOSIS — I50.42 CHRONIC COMBINED SYSTOLIC AND DIASTOLIC HEART FAILURE (HCC): ICD-10-CM

## 2024-06-12 PROCEDURE — 3017F COLORECTAL CA SCREEN DOC REV: CPT | Performed by: INTERNAL MEDICINE

## 2024-06-12 PROCEDURE — 1123F ACP DISCUSS/DSCN MKR DOCD: CPT | Performed by: INTERNAL MEDICINE

## 2024-06-12 PROCEDURE — 3074F SYST BP LT 130 MM HG: CPT | Performed by: INTERNAL MEDICINE

## 2024-06-12 PROCEDURE — 1036F TOBACCO NON-USER: CPT | Performed by: INTERNAL MEDICINE

## 2024-06-12 PROCEDURE — G8417 CALC BMI ABV UP PARAM F/U: HCPCS | Performed by: INTERNAL MEDICINE

## 2024-06-12 PROCEDURE — 99214 OFFICE O/P EST MOD 30 MIN: CPT | Performed by: INTERNAL MEDICINE

## 2024-06-12 PROCEDURE — G8427 DOCREV CUR MEDS BY ELIG CLIN: HCPCS | Performed by: INTERNAL MEDICINE

## 2024-06-12 PROCEDURE — 3078F DIAST BP <80 MM HG: CPT | Performed by: INTERNAL MEDICINE

## 2024-06-12 NOTE — PATIENT INSTRUCTIONS
Keep appointment with Dr. Stout on 7/2/24    Call office with symptoms - shortness of breath with activity, increased chest tightness/pain with activity, increased palpitations/heart racing with light-headedness -     Labs in September - TSH and Hepatic Function    Follow up with Dr. Rutledge in 6 months

## 2024-06-12 NOTE — PROGRESS NOTES
regimen.  Continue current medications.    Summary of Assessment and Plan:  6/12/24    No medication changes today   Monitor for bleeding while on NOAC  Repeat TSH and Hepatic Function Panel in September  Continue risk factor modifications   Call for any new or worsening symptoms.     All new cardiac testing and lab results personally reviewed by me during this office visit and discussed with patient.    Patient counseled on lifestyle modification, diet, and exercise.    Follow Up: 6 months    DIANA Rutledge MD  [facility] Cardiologist  [phone]  [email or web address]    Scribe Attestation: This note was scribed in the presence of Dr. Maty MD by Nicol Andrade RN.    Physician Attestation  The scribe wrote this note in the presence of me (Real Rutledge MD).  The scribe may have prepopulated components of this note with my historical  intellectual property under my direct supervision.  Any additions to this intellectual property were performed in my presence and at my direction.  Furthermore, the content and accuracy of this note have been reviewed by me with edits by me as needed.  Real Rutledge MD 6/12/2024 11:40 AM

## 2024-06-13 ENCOUNTER — APPOINTMENT (OUTPATIENT)
Dept: PHYSICAL THERAPY | Age: 74
End: 2024-06-13
Payer: MEDICARE

## 2024-06-18 ENCOUNTER — HOSPITAL ENCOUNTER (OUTPATIENT)
Dept: PHYSICAL THERAPY | Age: 74
Setting detail: THERAPIES SERIES
Discharge: HOME | End: 2024-06-18
Payer: MEDICARE

## 2024-06-18 PROCEDURE — 97110 THERAPEUTIC EXERCISES: CPT

## 2024-06-18 NOTE — FLOWSHEET NOTE
not wearing L knee brace today.  Frequent rest breaks d/t SOB.  Test measurements:     2024      MMT L MMT R Notes         HIP Flexion          Abduction         KNEE Flexion 34.1lbs 37.7lbs      Extension 37.8lbs 48.9lbs        2024  ROM/Strength: (Blank cells denote NT)     Mvmt (norm) AROM L AROM R Notes PROM L PROM R Notes         HIP Flex (120)                Abd (45)                ER (50)                IR (45)                Ext (20)                                 KNEE Flex (140) 100 100 Measured in sitting          Ext (0) 0 0                                ANKLE DF (20)                PF (50)                Inversion (30)                Eversion (20)                      MMT L MMT R Notes         HIP Flexion          Abduction         KNEE Flexion 33.2lbs 37.9lbs      Extension 32.7lbs 33.8lbs          3/28/2024  TU.72s     ROM/Strength: (Blank cells denote NT)     Mvmt (norm) AROM L AROM R Notes PROM L PROM R Notes                        LUMBAR Flex (90)              Ext (25)              SB (25)                Rotation (30)                                       HIP Flex (120)                Abd (45)                ER (50)                IR (45)                Ext (20)                                 KNEE Flex (140) 100 100 Measured in sitting          Ext (0) -5 0                                ANKLE DF (20)                PF (50)                Inversion (30)                Eversion (20)                      MMT L MMT R Notes         HIP Flexion          Abduction          ER          IR          Extension                     KNEE Flexion 3+/5 4/5      Extension 3-/5 4/5 Uanble to achieve full AROM     Exercises/Interventions:     Therapeutic Ex (61777)  resistance Sets/time Reps Notes/Cues/Progressions   Functional assessment  10 min  See above   LAQ 3# 2 8-10    bridge  1 10 table   LTR  1 10 Pauses at each side   SKTC with towel behind knee  5sec 10    Standing Hip abduction Red band at

## 2024-06-20 ENCOUNTER — HOSPITAL ENCOUNTER (OUTPATIENT)
Dept: PHYSICAL THERAPY | Age: 74
Setting detail: THERAPIES SERIES
Discharge: HOME | End: 2024-06-20
Payer: MEDICARE

## 2024-06-20 PROCEDURE — 97110 THERAPEUTIC EXERCISES: CPT

## 2024-06-20 NOTE — FLOWSHEET NOTE
face-to-face)     Neuromusc. Re-ed (27246)    Re-Eval (99893)     Manual (22015)    Estim Unattended (10795)     Ther. Act (05344) 6 0  Mech. Traction (17551)     Gait (79275)    Dry Needle 1-2 muscle (68939)     Aquatic Therex (52419)    Dry Needle 3+ muscle (20561)     Iontophoresis (18609)    VASO (24960)     Ultrasound (18372)    Group Therapy (86015)     Estim Attended (73358)    Canalith Repositioning (04538)     Other:    Other:    Total Timed Code Tx Minutes 36 2       Total Treatment Minutes 40        Charge Justification:  (04942) THERAPEUTIC EXERCISE - Provided verbal/tactile cueing for activities related to strengthening, flexibility, endurance, ROM performed to prevent loss of range of motion, maintain or improve muscular strength or increase flexibility, following either an injury or surgery.     TREATMENT PLAN   Plan: Cont POC- Continue emphasis/focus on exercise progression, improving proper muscle recruitment and activation/motor control patterns, and static and dynamic balance. Next visit plan to progress weights, progress reps, add new exercises, and progress balance per Pt yash.    Electronically Signed by Susy Everett PTA              Date: 06/20/2024     Note: If patient does not return for scheduled/recommended follow up visits, this note will serve as a discharge from care along with the most recent update on progress.

## 2024-06-25 ENCOUNTER — HOSPITAL ENCOUNTER (OUTPATIENT)
Dept: PHYSICAL THERAPY | Age: 74
Setting detail: THERAPIES SERIES
Discharge: HOME | End: 2024-06-25
Payer: MEDICARE

## 2024-06-25 PROCEDURE — 97530 THERAPEUTIC ACTIVITIES: CPT

## 2024-06-25 PROCEDURE — 97110 THERAPEUTIC EXERCISES: CPT

## 2024-06-25 NOTE — FLOWSHEET NOTE
[facility] - Outpatient Rehabilitation and Therapy [address] [place] [id number] office: [phone] fax: [phone]             Physical Therapy: TREATMENT/PROGRESS NOTE   Patient: Antolin Pena (73 y.o. male)   Treatment Date: 2024   :  1950 MRN: [id number]   Visit #: 39   Insurance Allowable Auth Needed   medicare []Yes    [x]No    Insurance: Payor: MEDICARE / Plan: MEDICARE PART A AND B / Product Type: *No Product type* /   Insurance ID: [id number] - (Medicare)  Secondary Insurance (if applicable): [employer] OF [place]   Treatment Diagnosis:     ICD-10-CM    1. Weakness generalized  R53.1       2. Chronic pain of left knee  M25.562     G89.29          Medical Diagnosis:    Age-related physical debility [R54]   Referring Physician: Kinza Padilla MD  PCP: Kaleb Lujan DO                             Plan of care signed (Y/N): N    Date of Patient follow up with Physician:      Progress Report/POC: no   POC update due: (10 visits /OR AUTH LIMITS, whichever is less) 28 visits or 2024     Precautions/ Contra-indications:                                                                                          Latex allergy:  NO  Pacemaker:    NO  Contraindications for Manipulation: unhealthy/ multiple comorbidities   Date of Surgery: n/a  Other:     Preferred Language for Healthcare:   [x]English       []other:    SUBJECTIVE EXAMINATION     Patient Report/Comments:  Pt reports that he is feeling a little better today. Agrees to try some heavier weights for chair exercises.   \"I haven't needed my inhaler as much.\"       Test used Initial score  23 POC  2024   Pain Summary VAS 0-2 0/10 0/10 currently   Functional questionnaire LEFS and Nelson Balance Scale  LEFS:   NELSON/56 LEFS:      Other:                  OBJECTIVE EXAMINATION     Observation: Pt not wearing L knee brace today.  Minimal rest breaks as needed today d/t

## 2024-06-28 ENCOUNTER — HOSPITAL ENCOUNTER (OUTPATIENT)
Dept: PHYSICAL THERAPY | Age: 74
Setting detail: THERAPIES SERIES
Discharge: HOME | End: 2024-06-28
Payer: MEDICARE

## 2024-06-28 PROCEDURE — 97110 THERAPEUTIC EXERCISES: CPT

## 2024-06-28 PROCEDURE — 97530 THERAPEUTIC ACTIVITIES: CPT

## 2024-06-28 NOTE — FLOWSHEET NOTE
in 1-2 min.  Test measurements:     2024      MMT L MMT R Notes         HIP Flexion          Abduction         KNEE Flexion 34.1lbs 37.7lbs      Extension 37.8lbs 48.9lbs        2024  ROM/Strength: (Blank cells denote NT)     Mvmt (norm) AROM L AROM R Notes PROM L PROM R Notes         HIP Flex (120)                Abd (45)                ER (50)                IR (45)                Ext (20)                                 KNEE Flex (140) 100 100 Measured in sitting          Ext (0) 0 0                                ANKLE DF (20)                PF (50)                Inversion (30)                Eversion (20)                      MMT L MMT R Notes         HIP Flexion          Abduction         KNEE Flexion 33.2lbs 37.9lbs      Extension 32.7lbs 33.8lbs          3/28/2024  TU.72s     ROM/Strength: (Blank cells denote NT)     Mvmt (norm) AROM L AROM R Notes PROM L PROM R Notes                        LUMBAR Flex (90)              Ext (25)              SB (25)                Rotation (30)                                       HIP Flex (120)                Abd (45)                ER (50)                IR (45)                Ext (20)                                 KNEE Flex (140) 100 100 Measured in sitting          Ext (0) -5 0                                ANKLE DF (20)                PF (50)                Inversion (30)                Eversion (20)                      MMT L MMT R Notes         HIP Flexion          Abduction          ER          IR          Extension                     KNEE Flexion 3+/5 4/5      Extension 3-/5 4/5 Uanble to achieve full AROM     Exercises/Interventions:     Therapeutic Ex (53946)  resistance Sets/time Reps Notes/Cues/Progressions   Functional assessment  10 min  See above   LAQ 5# 2 8-10    bridge  1 10 table   LTR  1 10 Pauses at each side   SKTC with towel behind knee  5sec 10           hip ext at wall  2 10 no wt   Cybex HS curl 40# 3 8-10 Seat 4   Cybex leg ext

## 2024-07-01 ENCOUNTER — TELEPHONE (OUTPATIENT)
Dept: CARDIOLOGY CLINIC | Age: 74
End: 2024-07-01

## 2024-07-01 NOTE — TELEPHONE ENCOUNTER
Called patient to confirm appointment tomorrow with Dr. Del Rio and patient said that he does not think that he wants to proceed with the watchman procedure.  Patient would like to cancel his appointment for tomorrow.  Please advise.  viral

## 2024-07-02 ENCOUNTER — HOSPITAL ENCOUNTER (OUTPATIENT)
Dept: PHYSICAL THERAPY | Age: 74
Setting detail: THERAPIES SERIES
Discharge: HOME | End: 2024-07-02
Payer: MEDICARE

## 2024-07-02 PROCEDURE — 97530 THERAPEUTIC ACTIVITIES: CPT

## 2024-07-02 PROCEDURE — 97110 THERAPEUTIC EXERCISES: CPT

## 2024-07-02 NOTE — FLOWSHEET NOTE
towel behind knee  5sec 10           hip ext at wall  2 10 no wt   Cybex HS curl 40# 3 8-10 Seat 4   Cybex leg ext 35/30# 3 8-10 Seat 3; fatigue by end of last set   bike    Add NV   Adduction ball squeeze  2 10 Orange unweighted ball   Seated clamshell maroon 3 10    Seated hamstring pull black band, foot on slider 2 10 bilat          Seated hip abd as chair allows Small blue 3 10    Seated march  5# 2 8-10    Leg press 90# 3 8-10 Cues to prevent lock out of knee; progress weight as able; seat 7   Seated HR/TR 5#; 4in box under toes 2 8-10    Standing hip ext c assist from 1/2 wall       Standing marches  2 10 1/2 wall, level surface   Standing hip abd kicks at wall 5# 2 5  UE support at 1/2 wall   Seated band rows Black looped band 2 15    Seated alt UE band punch Black looped band 2 10    TKE    3pl, cane in R hand   Standing HS curl 3#      Step ups  2 10 6in box at 1/2 wall   side stepping  1 20 Fatigue by end, cane with Pt for balance assist as needed, SBA   BOSU Lunges Wall assist for UE as needed   2x10 each leg   Squats at wall    UE assist as needed   Hip extensions resting on table 5# 2 10    Manual Intervention (26897)  TIME                                        NMR re-education (83450) x  resistance Sets/time [id number] CUES NEEDED   Cone step over - lateral  Rest break for fatigue, SOB   Marches Hands at wall Cues for minimizing lateral trunk lean   Toe taps on cybex balance machine Alternating sides, 1 UE assist 2 10 No wt                 Therapeutic Activity (91735) x 10  Sets/time     Functional assessment  0 min     STS Chair + 2 airex 3 10 UE assist as needed on chair arms   Step ups at wall baljeet 2 10 6in box, hands at wall- challenging   Step ups onto cybex balance machine Airex  2 10 1 set per side, fwd and side            Modalities:    No modalities applied this session    Education/Home Exercise Program: Not enough time for HEP instruction this visit.  Plan to address at follow

## 2024-07-09 ENCOUNTER — HOSPITAL ENCOUNTER (OUTPATIENT)
Dept: PHYSICAL THERAPY | Age: 74
Setting detail: THERAPIES SERIES
Discharge: HOME | End: 2024-07-09
Payer: MEDICARE

## 2024-07-09 PROCEDURE — 97530 THERAPEUTIC ACTIVITIES: CPT

## 2024-07-09 PROCEDURE — 97110 THERAPEUTIC EXERCISES: CPT

## 2024-07-09 NOTE — FLOWSHEET NOTE
[facility] - Outpatient Rehabilitation and Therapy [address] [place] [address] office: [phone] fax: [phone]             Physical Therapy: TREATMENT/PROGRESS NOTE   Patient: Antolin Pena (73 y.o. male)   Treatment Date: 2024   :  1950 MRN: [id number]   Visit #: 42   Insurance Allowable Auth Needed   medicare []Yes    [x]No    Insurance: Payor: MEDICARE / Plan: MEDICARE PART A AND B / Product Type: *No Product type* /   Insurance ID: [id number] - (Medicare)  Secondary Insurance (if applicable): [employer] OF [place]   Treatment Diagnosis:     ICD-10-CM    1. Weakness generalized  R53.1       2. Chronic pain of left knee  M25.562     G89.29          Medical Diagnosis:    Age-related physical debility [R54]   Referring Physician: Kinza Padilla MD  PCP: Kaleb Lujan DO                             Plan of care signed (Y/N): N    Date of Patient follow up with Physician:      Progress Report/POC: no   POC update due: (10 visits /OR AUTH LIMITS, whichever is less) 28 visits or 2024     Precautions/ Contra-indications:                                                                                          Latex allergy:  NO  Pacemaker:    NO  Contraindications for Manipulation: unhealthy/ multiple comorbidities   Date of Surgery: n/a  Other:     Preferred Language for Healthcare:   [x]English       []other:    SUBJECTIVE EXAMINATION     Patient Report/Comments:  Pt reports that he did ok after using the higher steps last visit.  No new complaints today.                    Test used Initial score  23 POC  2024   Pain Summary VAS 0-2 0/10 0/10 currently   Functional questionnaire LEFS and Nelson Balance Scale  LEFS:   NELSON/56 LEFS:      Other:                  OBJECTIVE EXAMINATION     Observation: Pt not wearing L knee brace today. Decreased rest breaks needed today.   Test measurements:     2024      MMT L MMT R

## 2024-07-11 ENCOUNTER — HOSPITAL ENCOUNTER (OUTPATIENT)
Dept: PHYSICAL THERAPY | Age: 74
Setting detail: THERAPIES SERIES
Discharge: HOME | End: 2024-07-11
Payer: MEDICARE

## 2024-07-11 PROCEDURE — 97110 THERAPEUTIC EXERCISES: CPT

## 2024-07-11 PROCEDURE — 97530 THERAPEUTIC ACTIVITIES: CPT

## 2024-07-11 NOTE — FLOWSHEET NOTE
[facility] - Outpatient Rehabilitation and Therapy [address] [place] [address] office: [phone] fax: [phone]             Physical Therapy: TREATMENT/PROGRESS NOTE   Patient: Antolin Pena (73 y.o. male)   Treatment Date: 2024   :  1950 MRN: [id number]   Visit #: 43   Insurance Allowable Auth Needed   medicare []Yes    [x]No    Insurance: Payor: MEDICARE / Plan: MEDICARE PART A AND B / Product Type: *No Product type* /   Insurance ID: [id number] - (Medicare)  Secondary Insurance (if applicable): [employer] [place]   Treatment Diagnosis:     ICD-10-CM    1. Weakness generalized  R53.1       2. Chronic pain of left knee  M25.562     G89.29          Medical Diagnosis:    Age-related physical debility [R54]   Referring Physician: Kinza Padilla MD  PCP: Kaleb Lujan DO                             Plan of care signed (Y/N): N    Date of Patient follow up with Physician:      Progress Report/POC: no   POC update due: (10 visits /OR AUTH LIMITS, whichever is less) 28 visits or 2024     Precautions/ Contra-indications:                                                                                          Latex allergy:  NO  Pacemaker:    NO  Contraindications for Manipulation: unhealthy/ multiple comorbidities   Date of Surgery: n/a  Other:     Preferred Language for Healthcare:   [x]English       []other:    SUBJECTIVE EXAMINATION     Patient Report/Comments:  Pt reports that he has been working on the leg kicks in his chair and the side steps in the hallway at home. Feeling a little better.     Test used Initial score  23 POC  2024   Pain Summary VAS 0-2 0/10 0/10 currently   Functional questionnaire LEFS and Nelson Balance Scale  LEFS:   NELSON/56 LEFS:      Other:                  OBJECTIVE EXAMINATION     Observation: Pt not wearing L knee brace today. Decreased rest breaks needed today.   Test measurements:

## 2024-07-16 ENCOUNTER — HOSPITAL ENCOUNTER (OUTPATIENT)
Dept: PHYSICAL THERAPY | Age: 74
Setting detail: THERAPIES SERIES
Discharge: HOME | End: 2024-07-16
Payer: MEDICARE

## 2024-07-16 PROCEDURE — 97530 THERAPEUTIC ACTIVITIES: CPT

## 2024-07-16 PROCEDURE — 97110 THERAPEUTIC EXERCISES: CPT

## 2024-07-16 NOTE — PLAN OF CARE
yash.      Electronically Signed by Nikkie Collado, PT              Date: 07/16/2024     Note: If patient does not return for scheduled/recommended follow up visits, this note will serve as a discharge from care along with the most recent update on progress.

## 2024-07-18 ENCOUNTER — HOSPITAL ENCOUNTER (OUTPATIENT)
Dept: PHYSICAL THERAPY | Age: 74
Setting detail: THERAPIES SERIES
Discharge: HOME | End: 2024-07-18
Payer: MEDICARE

## 2024-07-18 PROCEDURE — 97530 THERAPEUTIC ACTIVITIES: CPT

## 2024-07-18 PROCEDURE — 97110 THERAPEUTIC EXERCISES: CPT

## 2024-07-18 NOTE — FLOWSHEET NOTE
HIP Flexion          Abduction         KNEE Flexion 41.8lbs 47.5lbs      Extension 44.6lbs 49.3lbs        2024      MMT L MMT R Notes         HIP Flexion          Abduction         KNEE Flexion 34.1lbs 37.7lbs      Extension 37.8lbs 48.9lbs        2024  ROM/Strength: (Blank cells denote NT)     Mvmt (norm) AROM L AROM R Notes PROM L PROM R Notes         HIP Flex (120)                Abd (45)                ER (50)                IR (45)                Ext (20)                                 KNEE Flex (140) 100 100 Measured in sitting          Ext (0) 0 0                                ANKLE DF (20)                PF (50)                Inversion (30)                Eversion (20)                      MMT L MMT R Notes         HIP Flexion          Abduction         KNEE Flexion 33.2lbs 37.9lbs      Extension 32.7lbs 33.8lbs          3/28/2024  TU.72s     ROM/Strength: (Blank cells denote NT)     Mvmt (norm) AROM L AROM R Notes PROM L PROM R Notes                        LUMBAR Flex (90)              Ext (25)              SB (25)                Rotation (30)                                       HIP Flex (120)                Abd (45)                ER (50)                IR (45)                Ext (20)                                 KNEE Flex (140) 100 100 Measured in sitting          Ext (0) -5 0                                ANKLE DF (20)                PF (50)                Inversion (30)                Eversion (20)                      MMT L MMT R Notes         HIP Flexion          Abduction          ER          IR          Extension                     KNEE Flexion 3+/5 4/5      Extension 3-/5 4/5 [place] to achieve full AROM     Exercises/Interventions:     Therapeutic Ex (99012)  resistance Sets/time Reps Notes/Cues/Progressions   Functional assessment  0 min  See above   LAQ 6# 3 8-10    bridge  1 10 table   LTR  1 10 Pauses at each side   SKTC with towel behind knee  5sec 10

## 2024-07-23 ENCOUNTER — HOSPITAL ENCOUNTER (OUTPATIENT)
Dept: PHYSICAL THERAPY | Age: 74
Setting detail: THERAPIES SERIES
Discharge: HOME | End: 2024-07-23
Payer: MEDICARE

## 2024-07-23 PROCEDURE — 97110 THERAPEUTIC EXERCISES: CPT

## 2024-07-23 PROCEDURE — 97530 THERAPEUTIC ACTIVITIES: CPT

## 2024-07-23 NOTE — FLOWSHEET NOTE
[facility] - Outpatient Rehabilitation and Therapy [address] [place] [address] office: [phone] fax: [phone]        Physical Therapy: TREATMENT/PROGRESS NOTE   Patient: Antolin Pena (73 y.o. male)   Treatment Date: 2024   :  1950 MRN: [id number]   Visit #: 46   Insurance Allowable Auth Needed   medicare []Yes    [x]No    Insurance: Payor: MEDICARE / Plan: MEDICARE PART A AND B / Product Type: *No Product type* /   Insurance ID: [id number] - (Medicare)  Secondary Insurance (if applicable): [employer] OF [place]   Treatment Diagnosis:     ICD-10-CM    1. Weakness generalized  R53.1       2. Chronic pain of left knee  M25.562     G89.29          Medical Diagnosis:    Age-related physical debility [R54]   Referring Physician: Kinza Padilla MD  PCP: Kaleb Lujan DO                             Plan of care signed (Y/N): N    Date of Patient follow up with Physician:      Progress Report/POC: no; next POC 2024  POC update due: (10 visits /OR AUTH LIMITS, whichever is less) 54 visits or 2024     Precautions/ Contra-indications:                                                                                          Latex allergy:  NO  Pacemaker:    NO  Contraindications for Manipulation: unhealthy/ multiple comorbidities   Date of Surgery: n/a  Other:     Preferred Language for Healthcare:   [x]English       []other:    SUBJECTIVE EXAMINATION     Patient Report/Comments:  Pt reports he feels ok. No new issues to report. Pt was able to work on their yard sale this weekend, carrying and folding clothes.      Test used Initial score  23 POC  2024   Pain Summary VAS 0-2 0/10 0/10 currently   Functional questionnaire LEFS and Nelson Balance Scale  LEFS:   NELSON/56 LEFS:   NELSON:    Other:                  OBJECTIVE EXAMINATION     Observation: Pt not wearing L knee brace today. Decreased rest breaks needed today.

## 2024-07-25 ENCOUNTER — HOSPITAL ENCOUNTER (OUTPATIENT)
Dept: PHYSICAL THERAPY | Age: 74
Setting detail: THERAPIES SERIES
Discharge: HOME | End: 2024-07-25
Payer: MEDICARE

## 2024-07-25 PROCEDURE — 97110 THERAPEUTIC EXERCISES: CPT

## 2024-07-25 PROCEDURE — 97530 THERAPEUTIC ACTIVITIES: CPT

## 2024-07-25 NOTE — FLOWSHEET NOTE
continued skilled intervention: [x] Yes  [] No      GOALS     Patient stated goal: \"get in and out of tractor, get stronger\"  Status: [] Progressing: [] Met: [x] Not Met: [] Adjusted     Therapist goals for Patient:   Short Term Goals: To be achieved in: 2 weeks  Independent in HEP and progression per patient tolerance, in order to progress toward full function and prevent re-injury.               Status: [] Progressing: [x] Met: [] Not Met: [] Adjusted  Patient will have a decrease in pain to 0/10 to help  facilitate improvement in movement, function, and ADLs as indicated by functional deficits.              Status: [] Progressing: [x] Met: [] Not Met: [] Adjusted     Long Term Goals: To be achieved in: 4-6 weeks  Disability index score of 52 or more for the Hardy Balance Scale to assist with return top prior level of function.                     Status: [x] Progressing: [] Met: [] Not Met: [] Adjusted  Improve knee AROM Flexion to WFL to allow for proper joint functioning as indicated by patients functional deficits.  Status: [] Progressing: [x] Met: [] Not Met: [] Adjusted  Pt to improve strength to 4+/5 or better of quadriceps to allow for proper muscle and joint use in functional mobility, ADLs and prior level of function  Status: [] Progressing: [x] Met: [] Not Met: [] Adjusted  Patient will return to putting shoes/socks on without increased symptoms or restriction to work towards return to prior level of function.  Status: [] Progressing: [x] Met: [] Not Met: [] Adjusted  Pt will be able to go up and down 4 8inch steps safely and without assist or supervision to mimic being able to get in and out of tractor.                                                                                                              Status: [] Progressing: [] Met: [] Not Met: [x] Adjusted    Overall Progression Towards Functional goals/ Treatment Progress Update:  [] Patient is progressing as expected towards functional goals

## 2024-07-30 ENCOUNTER — HOSPITAL ENCOUNTER (OUTPATIENT)
Dept: PHYSICAL THERAPY | Age: 74
Setting detail: THERAPIES SERIES
Discharge: HOME | End: 2024-07-30
Payer: MEDICARE

## 2024-07-30 PROCEDURE — 97530 THERAPEUTIC ACTIVITIES: CPT

## 2024-07-30 PROCEDURE — 97110 THERAPEUTIC EXERCISES: CPT

## 2024-07-30 NOTE — FLOWSHEET NOTE
Towards Functional goals/ Treatment Progress Update:  [] Patient is progressing as expected towards functional goals listed.    [x] Progression is slowed due to complexities/Impairments listed.  [] Progression has been slowed due to co-morbidities.  [] Plan just implemented, too soon (<30days) to assess goals progression   [] Goals require adjustment due to lack of progress  [] Patient is not progressing as expected and requires additional follow up with physician  [] Other:     CHARGE CAPTURE     PT CHARGE GRID   CPT Code (TIMED) minutes # CPT Code (UNTIMED) #     Therex (53592)  32 2  EVAL:MODERATE (06195 - Typically 30 minutes face-to-face)     Neuromusc. Re-ed (28583)    Re-Eval (86607)     Manual (01133)    Estim Unattended (60173)     Ther. Act (39238) 10 1  Mech. Traction (69150)     Gait (91392)    Dry Needle 1-2 muscle (20560)     Aquatic Therex (04279)    Dry Needle 3+ muscle (20561)     Iontophoresis (31627)    VASO (10952)     Ultrasound (49035)    Group Therapy (67905)     Estim Attended (41485)    Canalith Repositioning (12546)     Other:    Other:    Total Timed Code Tx Minutes 42 3       Total Treatment Minutes 42        Charge Justification:  (59705) THERAPEUTIC EXERCISE - Provided verbal/tactile cueing for activities related to strengthening, flexibility, endurance, ROM performed to prevent loss of range of motion, maintain or improve muscular strength or increase flexibility, following either an injury or surgery.     TREATMENT PLAN   Plan: Cont POC- Continue emphasis/focus on exercise progression, improving proper muscle recruitment and activation/motor control patterns, and static and dynamic balance. Next visit plan to progress weights, progress reps, add new exercises, and progress balance per Pt yash.      Electronically Signed by Susy Everett, BEVERLY              Date: 07/30/2024     Note: If patient does not return for scheduled/recommended follow up visits, this note will serve as a

## 2024-08-01 ENCOUNTER — HOSPITAL ENCOUNTER (OUTPATIENT)
Dept: PHYSICAL THERAPY | Age: 74
Setting detail: THERAPIES SERIES
Discharge: HOME | End: 2024-08-01
Payer: MEDICARE

## 2024-08-01 PROCEDURE — 97110 THERAPEUTIC EXERCISES: CPT

## 2024-08-01 PROCEDURE — 97530 THERAPEUTIC ACTIVITIES: CPT

## 2024-08-01 NOTE — FLOWSHEET NOTE
[facility] - Outpatient Rehabilitation and Therapy [address] [place] [address] office: [phone] fax: [phone]        Physical Therapy: TREATMENT/PROGRESS NOTE   Patient: Antolin Pena (73 y.o. male)   Treatment Date: 2024   :  1950 MRN: [id number]   Visit #: 49   Insurance Allowable Auth Needed   medicare []Yes    [x]No    Insurance: Payor: MEDICARE / Plan: MEDICARE PART A AND B / Product Type: *No Product type* /   Insurance ID: [id number] - (Medicare)  Secondary Insurance (if applicable): [employer] [place]   Treatment Diagnosis:     ICD-10-CM    1. Weakness generalized  R53.1       2. Chronic pain of left knee  M25.562     G89.29          Medical Diagnosis:    Age-related physical debility [R54]   Referring Physician: Kinza Padilla MD  PCP: Kaleb Lujan DO                             Plan of care signed (Y/N): N    Date of Patient follow up with Physician:      Progress Report/POC: no; next POC 2024  POC update due: (10 visits /OR AUTH LIMITS, whichever is less) 54 visits or 2024     Precautions/ Contra-indications:                                                                                          Latex allergy:  NO  Pacemaker:    NO  Contraindications for Manipulation: unhealthy/ multiple comorbidities   Date of Surgery: n/a  Other:     Preferred Language for Healthcare:   [x]English       []other:      SUBJECTIVE EXAMINATION     Patient Report/Comments:  Patient reports that he is doing well. Getting better at walking on level ground but has to side step at times to catch himself at times. Has been compliant with HEP.    Test used Initial score  23 POC  2024   Pain Summary VAS 0-2 0/10 0/10 currently   Functional questionnaire LEFS and Nelson Balance Scale  LEFS:   NELSON/56 LEFS:   NELSON:    Other:                  OBJECTIVE EXAMINATION     Observation: Pt not wearing L knee brace today.

## 2024-08-06 ENCOUNTER — HOSPITAL ENCOUNTER (OUTPATIENT)
Dept: PHYSICAL THERAPY | Age: 74
Setting detail: THERAPIES SERIES
Discharge: HOME | End: 2024-08-06
Payer: MEDICARE

## 2024-08-06 PROCEDURE — 97110 THERAPEUTIC EXERCISES: CPT

## 2024-08-06 NOTE — FLOWSHEET NOTE
[facility] - Outpatient Rehabilitation and Therapy [address] [place] [address] office: [phone] fax: [phone]        Physical Therapy: TREATMENT/PROGRESS NOTE   Patient: Antolin Pena (73 y.o. male)   Treatment Date: 2024   :  1950 MRN: [id number]   Visit #: 50   Insurance Allowable Auth Needed   medicare []Yes    [x]No    Insurance: Payor: MEDICARE / Plan: MEDICARE PART A AND B / Product Type: *No Product type* /   Insurance ID: [id number] - (Medicare)  Secondary Insurance (if applicable): [employer] OF [place]   Treatment Diagnosis:     ICD-10-CM    1. Weakness generalized  R53.1       2. Chronic pain of left knee  M25.562     G89.29          Medical Diagnosis:    Age-related physical debility [R54]   Referring Physician: Kinza Padilla MD  PCP: Kaleb Lujan DO                             Plan of care signed (Y/N): N    Date of Patient follow up with Physician:      Progress Report/POC: no; next POC 2024  POC update due: (10 visits /OR AUTH LIMITS, whichever is less) 54 visits or 2024     Precautions/ Contra-indications:                                                                                          Latex allergy:  NO  Pacemaker:    NO  Contraindications for Manipulation: unhealthy/ multiple comorbidities   Date of Surgery: n/a  Other:     Preferred Language for Healthcare:   [x]English       []other:      SUBJECTIVE EXAMINATION     Patient Report/Comments:  Patient reports that he did some chores this morning and his back is feeling sore, states it happens sometimes, usually feels better when he's able to sit. No issues after last session.     Test used Initial score  23 POC  2024   Pain Summary VAS 0-2 0/10 3/10 low back   Functional questionnaire LEFS and Nelson Balance Scale  LEFS:   ENLSON/56 LEFS:   NELSON:    Other:                  OBJECTIVE EXAMINATION     Observation: Pt not wearing L knee

## 2024-08-08 ENCOUNTER — HOSPITAL ENCOUNTER (OUTPATIENT)
Dept: PHYSICAL THERAPY | Age: 74
Setting detail: THERAPIES SERIES
Discharge: HOME | End: 2024-08-08
Payer: MEDICARE

## 2024-08-08 PROCEDURE — 97110 THERAPEUTIC EXERCISES: CPT

## 2024-08-08 NOTE — FLOWSHEET NOTE
Status: [] Progressing: [] Met: [] Not Met: [x] Adjusted    Overall Progression Towards Functional goals/ Treatment Progress Update:  [] Patient is progressing as expected towards functional goals listed.    [x] Progression is slowed due to complexities/Impairments listed.  [] Progression has been slowed due to co-morbidities.  [] Plan just implemented, too soon (<30days) to assess goals progression   [] Goals require adjustment due to lack of progress  [] Patient is not progressing as expected and requires additional follow up with physician  [] Other:     CHARGE CAPTURE     PT CHARGE GRID   CPT Code (TIMED) minutes # CPT Code (UNTIMED) #     Therex (01807)  35 3  EVAL:MODERATE (81168 - Typically 30 minutes face-to-face)     Neuromusc. Re-ed (06388)    Re-Eval (45491)     Manual (16045)    Estim Unattended (01044)     Ther. Act (61797) 5   Mech. Traction (14779)     Gait (45995)    Dry Needle 1-2 muscle (20560)     Aquatic Therex (55669)    Dry Needle 3+ muscle (20561)     Iontophoresis (43548)    VASO (30738)     Ultrasound (94666)    Group Therapy (14396)     Estim Attended (82351)    Canalith Repositioning (08316)     Other:    Other:    Total Timed Code Tx Minutes 40 3       Total Treatment Minutes 40        Charge Justification:  (99237) THERAPEUTIC EXERCISE - Provided verbal/tactile cueing for activities related to strengthening, flexibility, endurance, ROM performed to prevent loss of range of motion, maintain or improve muscular strength or increase flexibility, following either an injury or surgery.     TREATMENT PLAN   Plan: Cont POC- Continue emphasis/focus on exercise progression, improving proper muscle recruitment and activation/motor control patterns, and static and dynamic balance. Next visit plan to progress weights, progress reps, add new exercises, and progress balance per Pt yash.      Electronically Signed by Nikkie Collado, PT              Date: 08/08/2024     Note: If patient does not return

## 2024-08-13 ENCOUNTER — HOSPITAL ENCOUNTER (OUTPATIENT)
Dept: PHYSICAL THERAPY | Age: 74
Setting detail: THERAPIES SERIES
Discharge: HOME | End: 2024-08-13
Payer: MEDICARE

## 2024-08-13 PROCEDURE — 97530 THERAPEUTIC ACTIVITIES: CPT

## 2024-08-13 PROCEDURE — 97110 THERAPEUTIC EXERCISES: CPT

## 2024-08-15 ENCOUNTER — HOSPITAL ENCOUNTER (OUTPATIENT)
Dept: PHYSICAL THERAPY | Age: 74
Setting detail: THERAPIES SERIES
Discharge: HOME | End: 2024-08-15
Payer: MEDICARE

## 2024-08-15 PROCEDURE — 97110 THERAPEUTIC EXERCISES: CPT

## 2024-08-15 NOTE — FLOWSHEET NOTE
(20560)     Aquatic Therex (95418)    Dry Needle 3+ muscle (20561)     Iontophoresis (09252)    VASO (37724)     Ultrasound (86975)    Group Therapy (17755)     Estim Attended (05522)    Canalith Repositioning (84003)     Other:    Other:    Total Timed Code Tx Minutes 42 3       Total Treatment Minutes 42        Charge Justification:  (47823) THERAPEUTIC EXERCISE - Provided verbal/tactile cueing for activities related to strengthening, flexibility, endurance, ROM performed to prevent loss of range of motion, maintain or improve muscular strength or increase flexibility, following either an injury or surgery.   (46321) THERAPEUTIC ACTIVITY - use of dynamic activities to improve functional performance. (Ex include squatting, ascending/descending stairs, walking, bending, lifting, catching, throwing, pushing, pulling, jumping.)  Direct, one on one contact, billed in 15-minute increments.    TREATMENT PLAN   Plan: Cont POC- Continue emphasis/focus on exercise progression, improving proper muscle recruitment and activation/motor control patterns, static and dynamic balance, and kinesthetic sense and proprioception. Next visit plan to progress weights, progress reps, add new exercises, and progress balance ; continue 1 month, if functional balance and strength do not have improvement will transition to HEP    Electronically Signed by Susy Everett PTA              Date: 08/15/2024     Note: If patient does not return for scheduled/recommended follow up visits, this note will serve as a discharge from care along with the most recent update on progress.

## 2024-08-20 ENCOUNTER — HOSPITAL ENCOUNTER (OUTPATIENT)
Dept: PHYSICAL THERAPY | Age: 74
Setting detail: THERAPIES SERIES
Discharge: HOME | End: 2024-08-20
Payer: MEDICARE

## 2024-08-20 PROCEDURE — 97110 THERAPEUTIC EXERCISES: CPT

## 2024-08-20 PROCEDURE — 97530 THERAPEUTIC ACTIVITIES: CPT

## 2024-08-20 NOTE — FLOWSHEET NOTE
[facility] - Outpatient Rehabilitation and Therapy [address] [place] [address] office: [phone] fax: [phone]          Physical Therapy: TREATMENT/PROGRESS NOTE   Patient: Antolin Pena (73 y.o. male)   Treatment Date: 2024   :  1950 MRN: [id number]   Visit #: 54   Insurance Allowable Auth Needed   medicare []Yes    [x]No    Insurance: Payor: MEDICARE / Plan: MEDICARE PART A AND B / Product Type: *No Product type* /   Insurance ID: [id number] - (Medicare)  Secondary Insurance (if applicable): [employer] OF [place]   Treatment Diagnosis:     ICD-10-CM    1. Weakness generalized  R53.1       2. Chronic pain of left knee  M25.562     G89.29          Medical Diagnosis:    Age-related physical debility [R54]   Referring Physician: Kinza aPdilla MD  PCP: Kaleb Lujan DO                             Plan of care signed (Y/N): N    Date of Patient follow up with Physician:      Progress Report/POC: yes; next POC 2023  POC update due: (10 visits /OR AUTH LIMITS, whichever is less) 54 visits or 2024     Precautions/ Contra-indications:                                                                                          Latex allergy:  NO  Pacemaker:    NO  Contraindications for Manipulation: unhealthy/ multiple comorbidities   Date of Surgery: n/a  Other:     Preferred Language for Healthcare:   [x]English       []other:      SUBJECTIVE EXAMINATION     Patient Report/Comments:  Patient states that when he was leaving [employer] yesterday he felt a lot of pain in his L thigh for about 5 steps, but it was better after that. Still feeling some discomfort through the back off and on. 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           Test used Initial score  23 POC  2024   Pain Summary VAS 0-2 0/10 0/10   Functional questionnaire LEFS and Nelson Balance Scale  LEFS:   NELSON/56 LEFS:

## 2024-08-22 ENCOUNTER — HOSPITAL ENCOUNTER (OUTPATIENT)
Dept: PHYSICAL THERAPY | Age: 74
Setting detail: THERAPIES SERIES
Discharge: HOME | End: 2024-08-22
Payer: MEDICARE

## 2024-08-22 PROCEDURE — 97110 THERAPEUTIC EXERCISES: CPT

## 2024-08-22 PROCEDURE — 97530 THERAPEUTIC ACTIVITIES: CPT

## 2024-08-22 NOTE — FLOWSHEET NOTE
[facility] - Outpatient Rehabilitation and Therapy [address] [place] [address] office: [phone] fax: [phone]          Physical Therapy: TREATMENT/PROGRESS NOTE   Patient: Antolin Pena (73 y.o. male)   Treatment Date: 2024   :  1950 MRN: [id number]   Visit #: 55   Insurance Allowable Auth Needed   medicare []Yes    [x]No    Insurance: Payor: MEDICARE / Plan: MEDICARE PART A AND B / Product Type: *No Product type* /   Insurance ID: [id number] - (Medicare)  Secondary Insurance (if applicable): [employer] OF [place]   Treatment Diagnosis:     ICD-10-CM    1. Weakness generalized  R53.1       2. Chronic pain of left knee  M25.562     G89.29          Medical Diagnosis:    Age-related physical debility [R54]   Referring Physician: Kinza Padilla MD  PCP: Kaleb Lujan DO                             Plan of care signed (Y/N): N    Date of Patient follow up with Physician:      Progress Report/POC: yes; next POC 2023  POC update due: (10 visits /OR AUTH LIMITS, whichever is less) 54 visits or 2024     Precautions/ Contra-indications:                                                                                          Latex allergy:  NO  Pacemaker:    NO  Contraindications for Manipulation: unhealthy/ multiple comorbidities   Date of Surgery: n/a  Other:     Preferred Language for Healthcare:   [x]English       []other:      SUBJECTIVE EXAMINATION     Patient Report/Comments:  Patient states that 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 Feeling more tender through the back today.      Test used Initial score  23 POC  2024   Pain Summary VAS 0-2 0/10 0/10   Functional questionnaire LEFS and Nelson Balance Scale  LEFS:   NELSON/56 LEFS:   NELSON/56   Other:                  OBJECTIVE EXAMINATION     Observation: Pt not wearing L knee brace today. Decreased rest breaks needed  Left lower leg cellulitis  MRSA nares: negative  Lower ext doppler: negative for DVT  Course of Ancef

## 2024-08-27 ENCOUNTER — APPOINTMENT (OUTPATIENT)
Dept: PHYSICAL THERAPY | Age: 74
End: 2024-08-27
Payer: MEDICARE

## 2024-08-29 ENCOUNTER — APPOINTMENT (OUTPATIENT)
Dept: PHYSICAL THERAPY | Age: 74
End: 2024-08-29
Payer: MEDICARE

## 2024-09-03 ENCOUNTER — HOSPITAL ENCOUNTER (OUTPATIENT)
Dept: PHYSICAL THERAPY | Age: 74
Setting detail: THERAPIES SERIES
Discharge: HOME | End: 2024-09-03
Payer: MEDICARE

## 2024-09-03 PROCEDURE — 97110 THERAPEUTIC EXERCISES: CPT

## 2024-09-03 PROCEDURE — 97530 THERAPEUTIC ACTIVITIES: CPT

## 2024-09-03 NOTE — FLOWSHEET NOTE
[facility] - Outpatient Rehabilitation and Therapy [address] [place] [address] office: [phone] fax: [phone]          Physical Therapy: TREATMENT/PROGRESS NOTE   Patient: Antolin Pena (73 y.o. male)   Treatment Date: 2024   :  1950 MRN: [id number]   Visit #: 56   Insurance Allowable Auth Needed   medicare []Yes    [x]No    Insurance: Payor: MEDICARE / Plan: MEDICARE PART A AND B / Product Type: *No Product type* /   Insurance ID: [id number] - (Medicare)  Secondary Insurance (if applicable): [employer] OF [place]   Treatment Diagnosis:     ICD-10-CM    1. Weakness generalized  R53.1       2. Chronic pain of left knee  M25.562     G89.29          Medical Diagnosis:    Age-related physical debility [R54]   Referring Physician: Kinza Padilla MD  PCP: Kaleb Lujan DO                             Plan of care signed (Y/N): N    Date of Patient follow up with Physician:      Progress Report/POC: yes; next POC 2023  POC update due: (10 visits /OR AUTH LIMITS, whichever is less) 54 visits or 10/3/2024     Precautions/ Contra-indications:                                                                                          Latex allergy:  NO  Pacemaker:    NO  Contraindications for Manipulation: unhealthy/ multiple comorbidities   Date of Surgery: n/a  Other:     Preferred Language for Healthcare:   [x]English       []other:      SUBJECTIVE EXAMINATION     Patient Report/Comments:  Patient states that he fell at home in the yard about a week ago. \"I had just finished mowing and was putting some yard toys away and lost my balance.\"  Pt denies having his cane with him. He called a friend to help him up as he could not get up on his own.      Test used Initial score  23 POC  2024   Pain Summary VAS 0-2 0/10 0/10   Functional questionnaire LEFS and Nelson Balance Scale  LEFS:   NELSON/56 LEFS:   NELSON/56   Other:

## 2024-09-05 ENCOUNTER — HOSPITAL ENCOUNTER (OUTPATIENT)
Dept: PHYSICAL THERAPY | Age: 74
Setting detail: THERAPIES SERIES
Discharge: HOME | End: 2024-09-05
Payer: MEDICARE

## 2024-09-05 PROCEDURE — 97110 THERAPEUTIC EXERCISES: CPT

## 2024-09-05 PROCEDURE — 97530 THERAPEUTIC ACTIVITIES: CPT

## 2024-09-05 NOTE — FLOWSHEET NOTE
Activity (08752) x 15  Sets/time     Functional assessment        Bungee walks fwd/bwd 2 blue fwd, 1-2 blue bwd 1 5 CGA on SC, attac   Standing hip abduction Cues for trunk control 2 15 No wt          STS Chair + 1 airex 2 8 Needed use of hands on knees   Transfer from 1/2 kneeling to standing 2 Airex under knee 2 6 Chair on each side   Standing march Airex 2 10 Hands at wall as needed   TT walks 5lb TT 4 30ft Good control    Step ups onto cybex balance machine Airex    1 set per side, fwd and side   Step Ups 8 in 2 10  UE assist at wall as needed     Modalities:    No modalities applied this session    Education/Home Exercise Program: Not enough time for HEP instruction this visit.  Plan to address at follow up.        ASSESSMENT     Today's Assessment: POC/PROGRESS UPDATE: Pt. continues to present with functional deficits in strength symmetry, endurance of strength, cardiovascular endurance, and eccentric control  limiting ability with walking on even ground, managing community ambulation, walking up/down stairs, light home activity, and heavy home activity .  During therapy this date, patient required verbal cueing, tactile cueing, modification of technique, and progression of exercises and program for exercise progression, improving proper muscle recruitment and activation/motor control patterns, static and dynamic balance, and improving postural awareness. Patient will continue to benefit from ongoing evaluation and advanced clinical decision from a Physical Therapist to improve muscle strength, endurance, normalization of gait, balance and proprioception, and tolerance to work activity to safely return to PLOF and work/work related tasks without symptoms or restrictions. and Pt continues to improve functional tolerance in relation to tolerance to work activities, endurance, and ambulating without knee brace. Have discussed if Pt continues to plateau after the next month we will transition to HEP to maintain

## 2024-09-10 ENCOUNTER — HOSPITAL ENCOUNTER (OUTPATIENT)
Dept: PHYSICAL THERAPY | Age: 74
Setting detail: THERAPIES SERIES
Discharge: HOME | End: 2024-09-10
Payer: MEDICARE

## 2024-09-10 PROCEDURE — 97110 THERAPEUTIC EXERCISES: CPT

## 2024-09-10 PROCEDURE — 97530 THERAPEUTIC ACTIVITIES: CPT

## 2024-09-12 ENCOUNTER — HOSPITAL ENCOUNTER (OUTPATIENT)
Dept: PHYSICAL THERAPY | Age: 74
Setting detail: THERAPIES SERIES
Discharge: HOME | End: 2024-09-12
Payer: MEDICARE

## 2024-09-12 PROCEDURE — 97530 THERAPEUTIC ACTIVITIES: CPT

## 2024-09-12 PROCEDURE — 97110 THERAPEUTIC EXERCISES: CPT

## 2024-09-17 ENCOUNTER — HOSPITAL ENCOUNTER (OUTPATIENT)
Dept: PHYSICAL THERAPY | Age: 74
Setting detail: THERAPIES SERIES
Discharge: HOME | End: 2024-09-17
Payer: MEDICARE

## 2024-09-17 PROCEDURE — 97530 THERAPEUTIC ACTIVITIES: CPT

## 2024-09-17 PROCEDURE — 97110 THERAPEUTIC EXERCISES: CPT

## 2024-09-20 ENCOUNTER — HOSPITAL ENCOUNTER (OUTPATIENT)
Dept: PHYSICAL THERAPY | Age: 74
Setting detail: THERAPIES SERIES
Discharge: HOME | End: 2024-09-20
Payer: MEDICARE

## 2024-09-20 PROCEDURE — 97110 THERAPEUTIC EXERCISES: CPT

## 2024-09-20 PROCEDURE — 97530 THERAPEUTIC ACTIVITIES: CPT

## 2024-09-26 ENCOUNTER — HOSPITAL ENCOUNTER (OUTPATIENT)
Dept: PHYSICAL THERAPY | Age: 74
Setting detail: THERAPIES SERIES
Discharge: HOME | End: 2024-09-26
Payer: MEDICARE

## 2024-09-26 PROCEDURE — 97530 THERAPEUTIC ACTIVITIES: CPT

## 2024-09-26 PROCEDURE — 97110 THERAPEUTIC EXERCISES: CPT

## 2024-10-01 ENCOUNTER — APPOINTMENT (OUTPATIENT)
Dept: PHYSICAL THERAPY | Age: 74
End: 2024-10-01
Payer: MEDICARE

## 2024-10-08 ENCOUNTER — APPOINTMENT (OUTPATIENT)
Dept: PHYSICAL THERAPY | Age: 74
End: 2024-10-08
Payer: MEDICARE

## 2024-10-10 ENCOUNTER — APPOINTMENT (OUTPATIENT)
Dept: PHYSICAL THERAPY | Age: 74
End: 2024-10-10
Payer: MEDICARE

## 2024-10-15 ENCOUNTER — APPOINTMENT (OUTPATIENT)
Dept: PHYSICAL THERAPY | Age: 74
End: 2024-10-15
Payer: MEDICARE

## 2024-10-30 ENCOUNTER — HOSPITAL ENCOUNTER (OUTPATIENT)
Dept: PHYSICAL THERAPY | Age: 74
Setting detail: THERAPIES SERIES
Discharge: HOME | End: 2024-10-30
Payer: MEDICARE

## 2024-10-30 PROCEDURE — 97110 THERAPEUTIC EXERCISES: CPT

## 2024-10-30 PROCEDURE — 97164 PT RE-EVAL EST PLAN CARE: CPT

## 2024-10-30 NOTE — PLAN OF CARE
No      GOALS     Patient stated goal: \"get in and out of tractor, get stronger\"  Status: [] Progressing: [] Met: [x] Not Met: [] Adjusted     Therapist goals for Patient:   Short Term Goals: To be achieved in: 2 weeks  Independent in HEP and progression per patient tolerance, in order to progress toward full function and prevent re-injury.               Status: [] Progressing: [x] Met: [] Not Met: [] Adjusted  Patient will have a decrease in pain to 0/10 to help  facilitate improvement in movement, function, and ADLs as indicated by functional deficits.              Status: [] Progressing: [] Met: [x] Not Met: [] Adjusted     Long Term Goals: To be achieved in: 4-6 weeks  Disability index score of 52 or more for the Hardy Balance Scale to assist with return to prior level of function.                     Status: [] Progressing: [] Met: [x] Not Met: [] Adjusted  Improve knee AROM Flexion to WFL to allow for proper joint functioning as indicated by patients functional deficits.  Status: [] Progressing: [x] Met: [] Not Met: [] Adjusted  Pt to improve strength to 4+/5 or better of quadriceps to allow for proper muscle and joint use in functional mobility, ADLs and prior level of function  Status: [] Progressing: [] Met: [x] Not Met: [] Adjusted  Patient will return to putting shoes/socks on without increased symptoms or restriction to work towards return to prior level of function.  Status: [] Progressing: [] Met: [x] Not Met: [] Adjusted  Pt will be able to go up and down 4 8 inch steps safely and without assist or supervision to mimic being able to get in and out of tractor.                                                                                                              Status: [] Progressing: [] Met: [x] Not Met: [] Adjusted [x] Comment: attempted 9/17/24, required SBA supervision 2/2 balance leading with each leg today    Overall Progression Towards Functional goals/ Treatment Progress Update:  []

## 2024-11-01 ENCOUNTER — HOSPITAL ENCOUNTER (OUTPATIENT)
Dept: PHYSICAL THERAPY | Age: 74
Setting detail: THERAPIES SERIES
Discharge: HOME | End: 2024-11-01
Payer: MEDICARE

## 2024-11-01 PROCEDURE — 97110 THERAPEUTIC EXERCISES: CPT

## 2024-11-01 NOTE — FLOWSHEET NOTE
[facility] - Outpatient Rehabilitation and Therapy [address] [place] [address] office: [phone] fax: [phone]              Physical Therapy: TREATMENT/PROGRESS NOTE   Patient: Antolin Pena (74 y.o. male)   Treatment Date: 2024   :  1950 MRN: [id number]   Visit #: 64   Insurance Allowable Auth Needed   medicare []Yes    [x]No    Insurance: Payor: MEDICARE / Plan: MEDICARE PART A AND B / Product Type: *No Product type* /   Insurance ID: [id number] - (Medicare)  Secondary Insurance (if applicable): [employer] [place]   Treatment Diagnosis:     ICD-10-CM    1. Weakness generalized  R53.1       2. Chronic pain of left knee  M25.562     G89.29          Medical Diagnosis:    Age-related physical debility [R54]   Referring Physician: Kinza Padilla MD  PCP: Kaleb Lujan DO                             Plan of care signed (Y/N): N    Date of Patient follow up with Physician:      Progress Report/POC: No  POC update due: (10 visits /OR AUTH LIMITS, whichever is less) 54 visits or 2024     Precautions/ Contra-indications:                                                                                          Latex allergy:  NO  Pacemaker:    NO  Contraindications for Manipulation: unhealthy/ multiple comorbidities   Date of Surgery: n/a  Other:     Preferred Language for Healthcare:   [x]English       []other:      SUBJECTIVE EXAMINATION     Patient Report/Comments:  Pt reports that he did ok after last visit; he did not get too fatigued.  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  \"I need to  some parts for the combine; they'll have to bring them out to me.\" Pt did bring his RWR today; \"I don't really need it but I don't think my stability is as good right now.\"     Test used Initial score  23 POC  2024   Pain Summary VAS 0-2 0/10 0/10   Functional questionnaire LEFS and

## 2024-11-05 ENCOUNTER — HOSPITAL ENCOUNTER (OUTPATIENT)
Dept: PHYSICAL THERAPY | Age: 74
Setting detail: THERAPIES SERIES
Discharge: HOME | End: 2024-11-05
Payer: MEDICARE

## 2024-11-05 PROCEDURE — 97530 THERAPEUTIC ACTIVITIES: CPT

## 2024-11-05 PROCEDURE — 97110 THERAPEUTIC EXERCISES: CPT

## 2024-11-05 NOTE — FLOWSHEET NOTE
[facility] - Outpatient Rehabilitation and Therapy [address] [place] [address] office: [phone] fax: [phone]              Physical Therapy: TREATMENT/PROGRESS NOTE   Patient: Antolin Pena (74 y.o. male)   Treatment Date: 2024   :  1950 MRN: [id number]   Visit #: 65   Insurance Allowable Auth Needed   medicare []Yes    [x]No    Insurance: Payor: MEDICARE / Plan: MEDICARE PART A AND B / Product Type: *No Product type* /   Insurance ID: [id number] - (Medicare)  Secondary Insurance (if applicable): [employer] OF [place]   Treatment Diagnosis:     ICD-10-CM    1. Weakness generalized  R53.1       2. Chronic pain of left knee  M25.562     G89.29          Medical Diagnosis:    Age-related physical debility [R54]   Referring Physician: Kinza Padilla MD  PCP: Kaleb Lujan DO                             Plan of care signed (Y/N): N    Date of Patient follow up with Physician:      Progress Report/POC: No  POC update due: (10 visits /OR AUTH LIMITS, whichever is less) 54 visits or 2024     Precautions/ Contra-indications:                                                                                          Latex allergy:  NO  Pacemaker:    NO  Contraindications for Manipulation: unhealthy/ multiple comorbidities   Date of Surgery: n/a  Other:     Preferred Language for Healthcare:   [x]English       []other:      SUBJECTIVE EXAMINATION     Patient Report/Comments:  Pt reports that 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  Still using the RWR for the most part.      Test used Initial score  23 POC  2024   Pain Summary VAS 0-2 0/10 0/10   Functional questionnaire LEFS and Nelson Balance Scale   28 LEFS:   NELSON LEFS:   NELSON: unable to be tested   Other:                  OBJECTIVE EXAMINATION     Observation:     Test measurements:     10/31/2024      MMT L MMT R Notes

## 2024-11-07 ENCOUNTER — HOSPITAL ENCOUNTER (OUTPATIENT)
Dept: PHYSICAL THERAPY | Age: 74
Setting detail: THERAPIES SERIES
Discharge: HOME | End: 2024-11-07
Payer: MEDICARE

## 2024-11-07 PROCEDURE — 97110 THERAPEUTIC EXERCISES: CPT

## 2024-11-07 PROCEDURE — 97140 MANUAL THERAPY 1/> REGIONS: CPT

## 2024-11-07 NOTE — FLOWSHEET NOTE
(TIMED) minutes # CPT Code (UNTIMED) #     Therex (26503)  30 2  EVAL:MODERATE (70520 - Typically 30 minutes face-to-face)     Neuromusc. Re-ed (86873)    Re-Eval (23474)     Manual (58147)    Estim Unattended (25972)     Ther. Act (74198) 12 1  Mech. Traction (29917)     Gait (63464)    Dry Needle 1-2 muscle (81287)     Aquatic Therex (17964)    Dry Needle 3+ muscle (20561)     Iontophoresis (46401)    VASO (18112)     Ultrasound (13442)    Group Therapy (94710)     Estim Attended (85570)    Canalith Repositioning (89824)     Other:    Other:    Total Timed Code Tx Minutes 42 3       Total Treatment Minutes 46        Charge Justification:  (77513) THERAPEUTIC EXERCISE - Provided verbal/tactile cueing for activities related to strengthening, flexibility, endurance, ROM performed to prevent loss of range of motion, maintain or improve muscular strength or increase flexibility, following either an injury or surgery.   (68308) THERAPEUTIC ACTIVITY - use of dynamic activities to improve functional performance. (Ex include squatting, ascending/descending stairs, walking, bending, lifting, catching, throwing, pushing, pulling, jumping.)  Direct, one on one contact, billed in 15-minute increments.    TREATMENT PLAN   Plan: Cont POC- Continue emphasis/focus on exercise progression, improving proper muscle recruitment and activation/motor control patterns, static and dynamic balance, and kinesthetic sense and proprioception. Next visit plan to progress weights, progress reps, add new exercises, and progress balance ; decrease frequency 1x/week x 4 weeks; continue 1 month, if functional balance and strength do not have improvement will transition to HEP.   Electronically Signed by Susy Everett PTA              Date: 11/07/2024     Note: If patient does not return for scheduled/recommended follow up visits, this note will serve as a discharge from care along with the most recent update on progress.

## 2024-11-12 ENCOUNTER — HOSPITAL ENCOUNTER (OUTPATIENT)
Dept: PHYSICAL THERAPY | Age: 74
Setting detail: THERAPIES SERIES
Discharge: HOME | End: 2024-11-12
Payer: MEDICARE

## 2024-11-12 PROCEDURE — 97110 THERAPEUTIC EXERCISES: CPT

## 2024-11-12 PROCEDURE — 97530 THERAPEUTIC ACTIVITIES: CPT

## 2024-11-12 NOTE — FLOWSHEET NOTE
toward full function and prevent re-injury.               Status: [] Progressing: [x] Met: [] Not Met: [] Adjusted  Patient will have a decrease in pain to 0/10 to help  facilitate improvement in movement, function, and ADLs as indicated by functional deficits.              Status: [] Progressing: [] Met: [x] Not Met: [] Adjusted     Long Term Goals: To be achieved in: 4-6 weeks  Disability index score of 52 or more for the Hardy Balance Scale to assist with return to prior level of function.                     Status: [] Progressing: [] Met: [x] Not Met: [] Adjusted  Improve knee AROM Flexion to WFL to allow for proper joint functioning as indicated by patients functional deficits.  Status: [] Progressing: [x] Met: [] Not Met: [] Adjusted  Pt to improve strength to 4+/5 or better of quadriceps to allow for proper muscle and joint use in functional mobility, ADLs and prior level of function  Status: [] Progressing: [] Met: [x] Not Met: [] Adjusted  Patient will return to putting shoes/socks on without increased symptoms or restriction to work towards return to prior level of function.  Status: [] Progressing: [] Met: [x] Not Met: [] Adjusted  Pt will be able to go up and down 4 8 inch steps safely and without assist or supervision to mimic being able to get in and out of tractor.                                                                                                              Status: [] Progressing: [] Met: [x] Not Met: [] Adjusted [x] Comment: attempted 9/17/24, required SBA supervision 2/2 balance leading with each leg today    Overall Progression Towards Functional goals/ Treatment Progress Update:  [] Patient is progressing as expected towards functional goals listed.    [x] Progression is slowed due to complexities/Impairments listed.  [] Progression has been slowed due to co-morbidities.  [] Plan just implemented, too soon (<30days) to assess goals progression   [] Goals require adjustment due to

## 2024-11-14 ENCOUNTER — HOSPITAL ENCOUNTER (OUTPATIENT)
Dept: PHYSICAL THERAPY | Age: 74
Setting detail: THERAPIES SERIES
Discharge: HOME | End: 2024-11-14
Payer: MEDICARE

## 2024-11-14 PROCEDURE — 97110 THERAPEUTIC EXERCISES: CPT

## 2024-11-14 PROCEDURE — 97530 THERAPEUTIC ACTIVITIES: CPT

## 2024-11-14 NOTE — FLOWSHEET NOTE
Pt mother calling for refill on Trospium 300mg. pleaase send to [facility] [place]   without symptoms or restrictions. and     Pt able to tolerate slow progressions in load and reps with strengthening and balance tasks. Did report mild off-steadiness with rotations. Continue to progress cardiovascular endurance, functional strength, and functional balance for independence and safety.     Medical Necessity Documentation:  I certify that this patient meets the below criteria necessary for medical necessity for care and/or justification of therapy services:  The patient has functional impairments and/or activity limitations and would benefit from continued outpatient therapy services to address the deficits outlined in the patients goals  The patient has associated co-morbidities along with primary diagnosis which significantly impact the rate of recovery and contribute to complexities that require skilled therapeutic intervention  The patient requires ongoing skilled intervention in order to prevent decline of function and worsening of symptoms. Therapy will be administered sparingly.    Treatment/Activity Tolerance:  [] Patient tolerated treatment well [] Patient limited by fatique  [] Patient limited by pain  [x] Patient limited by other medical complications  [] Other:     Return to Play: NA    Prognosis for POC: [] Good [x] Fair  [] Poor    Patient requires continued skilled intervention: [x] Yes  [] No      GOALS     Patient stated goal: \"get in and out of tractor, get stronger\"  Status: [] Progressing: [] Met: [x] Not Met: [] Adjusted     Therapist goals for Patient:   Short Term Goals: To be achieved in: 2 weeks  Independent in HEP and progression per patient tolerance, in order to progress toward full function and prevent re-injury.               Status: [] Progressing: [x] Met: [] Not Met: [] Adjusted  Patient will have a decrease in pain to 0/10 to help  facilitate improvement in movement, function, and ADLs as indicated by functional deficits.              Status: [] Progressing: [] Met:

## 2024-11-19 ENCOUNTER — HOSPITAL ENCOUNTER (OUTPATIENT)
Dept: PHYSICAL THERAPY | Age: 74
Setting detail: THERAPIES SERIES
Discharge: HOME | End: 2024-11-19
Payer: MEDICARE

## 2024-11-19 PROCEDURE — 97110 THERAPEUTIC EXERCISES: CPT

## 2024-11-19 NOTE — FLOWSHEET NOTE
weights.  Good tolerrance overall with rest breaks.  Pt able to tolerate slow progressions in load and reps with strengthening and balance tasks.  Continue to progress cardiovascular endurance, functional strength, and functional balance for independence and safety.     Medical Necessity Documentation:  I certify that this patient meets the below criteria necessary for medical necessity for care and/or justification of therapy services:  The patient has functional impairments and/or activity limitations and would benefit from continued outpatient therapy services to address the deficits outlined in the patients goals  The patient has associated co-morbidities along with primary diagnosis which significantly impact the rate of recovery and contribute to complexities that require skilled therapeutic intervention  The patient requires ongoing skilled intervention in order to prevent decline of function and worsening of symptoms. Therapy will be administered sparingly.    Treatment/Activity Tolerance:  [] Patient tolerated treatment well [] Patient limited by fatique  [] Patient limited by pain  [x] Patient limited by other medical complications  [] Other:     Return to Play: NA    Prognosis for POC: [] Good [x] Fair  [] Poor    Patient requires continued skilled intervention: [x] Yes  [] No      GOALS     Patient stated goal: \"get in and out of tractor, get stronger\"  Status: [] Progressing: [] Met: [x] Not Met: [] Adjusted     Therapist goals for Patient:   Short Term Goals: To be achieved in: 2 weeks  Independent in HEP and progression per patient tolerance, in order to progress toward full function and prevent re-injury.               Status: [] Progressing: [x] Met: [] Not Met: [] Adjusted  Patient will have a decrease in pain to 0/10 to help  facilitate improvement in movement, function, and ADLs as indicated by functional deficits.              Status: [] Progressing: [] Met: [x] Not Met: [] Adjusted     Long

## 2024-11-21 ENCOUNTER — HOSPITAL ENCOUNTER (OUTPATIENT)
Dept: PHYSICAL THERAPY | Age: 74
Setting detail: THERAPIES SERIES
Discharge: HOME | End: 2024-11-21
Payer: MEDICARE

## 2024-11-21 PROCEDURE — 97110 THERAPEUTIC EXERCISES: CPT

## 2024-11-21 PROCEDURE — 97530 THERAPEUTIC ACTIVITIES: CPT

## 2024-11-21 NOTE — FLOWSHEET NOTE
Focused session on decreasing upper extremity assist while performing standing exercises with 3lb ankle weights.  Good tolerrance overall with rest breaks, though Antolin had one LOB with self-correction when he turned around to face the table after performing a sit to stand from his chair.  Pt able to tolerate slow progressions in load and reps with strengthening and balance tasks.  Continue to progress cardiovascular endurance, functional strength, and functional balance for independence and safety.     Medical Necessity Documentation:  I certify that this patient meets the below criteria necessary for medical necessity for care and/or justification of therapy services:  The patient has functional impairments and/or activity limitations and would benefit from continued outpatient therapy services to address the deficits outlined in the patients goals  The patient has associated co-morbidities along with primary diagnosis which significantly impact the rate of recovery and contribute to complexities that require skilled therapeutic intervention  The patient requires ongoing skilled intervention in order to prevent decline of function and worsening of symptoms. Therapy will be administered sparingly.    Treatment/Activity Tolerance:  [] Patient tolerated treatment well [] Patient limited by fatique  [] Patient limited by pain  [x] Patient limited by other medical complications  [] Other:     Return to Play: NA    Prognosis for POC: [] Good [x] Fair  [] Poor    Patient requires continued skilled intervention: [x] Yes  [] No      GOALS     Patient stated goal: \"get in and out of tractor, get stronger\"  Status: [] Progressing: [] Met: [x] Not Met: [] Adjusted     Therapist goals for Patient:   Short Term Goals: To be achieved in: 2 weeks  Independent in HEP and progression per patient tolerance, in order to progress toward full function and prevent re-injury.               Status: [] Progressing: [x] Met: [] Not Met:

## 2024-11-26 ENCOUNTER — HOSPITAL ENCOUNTER (OUTPATIENT)
Dept: PHYSICAL THERAPY | Age: 74
Setting detail: THERAPIES SERIES
Discharge: HOME | End: 2024-11-26
Payer: MEDICARE

## 2024-11-26 PROCEDURE — 97530 THERAPEUTIC ACTIVITIES: CPT

## 2024-11-26 PROCEDURE — 97110 THERAPEUTIC EXERCISES: CPT

## 2024-11-26 NOTE — FLOWSHEET NOTE
[facility] - Outpatient Rehabilitation and Therapy [address] [place] [id number] office: [phone] fax: [phone]              Physical Therapy: TREATMENT/PROGRESS NOTE   Patient: Antolin Pena (74 y.o. male)   Treatment Date: 2024   :  1950 MRN: [id number]   Visit #: 71   Insurance Allowable Auth Needed   medicare []Yes    [x]No    Insurance: Payor: MEDICARE / Plan: MEDICARE PART A AND B / Product Type: *No Product type* /   Insurance ID: [id number] - (Medicare)  Secondary Insurance (if applicable): [employer] OF [place]   Treatment Diagnosis:     ICD-10-CM    1. Weakness generalized  R53.1       2. Chronic pain of left knee  M25.562     G89.29          Medical Diagnosis:    Age-related physical debility [R54]   Referring Physician: Kinza Padilla MD  PCP: Kaleb Lujan DO                             Plan of care signed (Y/N): N    Date of Patient follow up with Physician:      Progress Report/POC: No  POC update due: (10 visits /OR AUTH LIMITS, whichever is less) 54 visits or 2024     Precautions/ Contra-indications:                                                                                          Latex allergy:  NO  Pacemaker:    NO  Contraindications for Manipulation: unhealthy/ multiple comorbidities   Date of Surgery: n/a  Other:     Preferred Language for Healthcare:   [x]English       []other:      SUBJECTIVE EXAMINATION     Patient Report/Comments:  Pt states that he is more sore all over today. \"Even my right knee hurts for some reason.\"  Antolin agrees to do what he can today.         Test used Initial score  23 POC  2024   Pain Summary VAS 0-2 0/10 0/10   Functional questionnaire LEFS and Nelson Balance Scale  LEFS:   NELSON/56 LEFS:   NELSON: unable to be tested   Other:                  OBJECTIVE EXAMINATION     Observation: Pt ambulates in clinic with RWR.    Test measurements:     10/31/2024

## 2024-12-03 ENCOUNTER — HOSPITAL ENCOUNTER (OUTPATIENT)
Dept: PHYSICAL THERAPY | Age: 74
Setting detail: THERAPIES SERIES
Discharge: HOME | End: 2024-12-03
Payer: MEDICARE

## 2024-12-03 PROCEDURE — 97110 THERAPEUTIC EXERCISES: CPT

## 2024-12-03 PROCEDURE — 97530 THERAPEUTIC ACTIVITIES: CPT

## 2024-12-03 PROCEDURE — 97112 NEUROMUSCULAR REEDUCATION: CPT

## 2024-12-03 NOTE — FLOWSHEET NOTE
[facility] - Outpatient Rehabilitation and Therapy [address] [place] [address] office: [phone] fax: [phone]              Physical Therapy: TREATMENT/PROGRESS NOTE   Patient: Antolin Pena (74 y.o. male)   Treatment Date: 2024   :  1950 MRN: [id number]   Visit #: 72   Insurance Allowable Auth Needed   medicare []Yes    [x]No    Insurance: Payor: MEDICARE / Plan: MEDICARE PART A AND B / Product Type: *No Product type* /   Insurance ID: [id number] - (Medicare)  Secondary Insurance (if applicable): [employer] [place]   Treatment Diagnosis:     ICD-10-CM    1. Weakness generalized  R53.1       2. Chronic pain of left knee  M25.562     G89.29          Medical Diagnosis:    Age-related physical debility [R54]   Referring Physician: Kinza Padilla MD  PCP: Kaleb Lujan DO                             Plan of care signed (Y/N): N    Date of Patient follow up with Physician:      Progress Report/POC: No  POC update due: (10 visits /OR AUTH LIMITS, whichever is less) 54 visits or 2025     Precautions/ Contra-indications:                                                                                          Latex allergy:  NO  Pacemaker:    NO  Contraindications for Manipulation: unhealthy/ multiple comorbidities   Date of Surgery: n/a  Other:     Preferred Language for Healthcare:   [x]English       []other:      SUBJECTIVE EXAMINATION     Patient Report/Comments:  Pt states that his \"tailbone hurts for some reason today.\"  Pt denies any recent falls.  Doing a little better with balance today.         Test used Initial score  23 POC  2024   Pain Summary VAS 0-2 0/10 0/10   Functional questionnaire LEFS and Nelson Balance Scale  LEFS:   NELSON/56 LEFS:   NELSON: unable to be tested   Other:                  OBJECTIVE EXAMINATION     Observation: Pt ambulates in clinic with RWR.    Test measurements:     10/31/2024      MMT

## 2024-12-05 ENCOUNTER — HOSPITAL ENCOUNTER (OUTPATIENT)
Dept: PHYSICAL THERAPY | Age: 74
Setting detail: THERAPIES SERIES
Discharge: HOME | End: 2024-12-05
Payer: MEDICARE

## 2024-12-05 PROCEDURE — 97530 THERAPEUTIC ACTIVITIES: CPT

## 2024-12-05 PROCEDURE — 97110 THERAPEUTIC EXERCISES: CPT

## 2024-12-05 NOTE — PLAN OF CARE
weeks; continue 1 month, if functional balance and strength do not have improvement will transition to HEP.   Electronically Signed by Nikkie Collado, PT              Date: 12/05/2024     Note: If patient does not return for scheduled/recommended follow up visits, this note will serve as a discharge from care along with the most recent update on progress.

## 2024-12-10 ENCOUNTER — HOSPITAL ENCOUNTER (OUTPATIENT)
Dept: PHYSICAL THERAPY | Age: 74
Setting detail: THERAPIES SERIES
Discharge: HOME | End: 2024-12-10
Payer: MEDICARE

## 2024-12-10 PROCEDURE — 97140 MANUAL THERAPY 1/> REGIONS: CPT

## 2024-12-10 PROCEDURE — 97110 THERAPEUTIC EXERCISES: CPT

## 2024-12-10 NOTE — FLOWSHEET NOTE
Adjusted  Patient will have a decrease in pain to 0/10 to help  facilitate improvement in movement, function, and ADLs as indicated by functional deficits.              Status: [] Progressing: [] Met: [x] Not Met: [] Adjusted     Long Term Goals: To be achieved in: 4-6 weeks  Disability index score of 52 or more for the Hardy Balance Scale to assist with return to prior level of function.                     Status: [] Progressing: [] Met: [x] Not Met: [] Adjusted  Improve knee AROM Flexion to WFL to allow for proper joint functioning as indicated by patients functional deficits.  Status: [] Progressing: [x] Met: [] Not Met: [] Adjusted  Pt to improve strength to 4+/5 or better of quadriceps to allow for proper muscle and joint use in functional mobility, ADLs and prior level of function  Status: [] Progressing: [] Met: [x] Not Met: [] Adjusted  Patient will return to putting shoes/socks on without increased symptoms or restriction to work towards return to prior level of function.  Status: [] Progressing: [] Met: [x] Not Met: [] Adjusted  Pt will be able to go up and down 4 8 inch steps safely and without assist or supervision to mimic being able to get in and out of tractor.                                                                                                              Status: [] Progressing: [] Met: [x] Not Met: [] Adjusted [x] Comment: attempted 9/17/24, required SBA supervision 2/2 balance leading with each leg today    Overall Progression Towards Functional goals/ Treatment Progress Update:  [] Patient is progressing as expected towards functional goals listed.    [x] Progression is slowed due to complexities/Impairments listed.  [] Progression has been slowed due to co-morbidities.  [] Plan just implemented, too soon (<30days) to assess goals progression   [] Goals require adjustment due to lack of progress  [] Patient is not progressing as expected and requires additional follow up with

## 2024-12-12 ENCOUNTER — HOSPITAL ENCOUNTER (OUTPATIENT)
Dept: PHYSICAL THERAPY | Age: 74
Setting detail: THERAPIES SERIES
Discharge: HOME | End: 2024-12-12
Payer: MEDICARE

## 2024-12-12 PROCEDURE — 97110 THERAPEUTIC EXERCISES: CPT

## 2024-12-12 PROCEDURE — 97530 THERAPEUTIC ACTIVITIES: CPT

## 2024-12-12 NOTE — FLOWSHEET NOTE
[facility] - Outpatient Rehabilitation and Therapy [address] [place] [address] office: [phone] fax: [phone]        Physical Therapy: TREATMENT/PROGRESS NOTE   Patient: Antolin Pena (74 y.o. male)   Treatment Date: 2024   :  1950 MRN: [id number]   Visit #: 75   Insurance Allowable Auth Needed   medicare []Yes    [x]No    Insurance: Payor: MEDICARE / Plan: MEDICARE PART A AND B / Product Type: *No Product type* /   Insurance ID: [id number] - (Medicare)  Secondary Insurance (if applicable): [employer] OF [place]   Treatment Diagnosis:     ICD-10-CM    1. Weakness generalized  R53.1       2. Chronic pain of left knee  M25.562     G89.29          Medical Diagnosis:    Age-related physical debility [R54]   Referring Physician: Kinza Padilla MD  PCP: Kaleb Lujan DO                             Plan of care signed (Y/N): N    Date of Patient follow up with Physician:      Progress Report/POC: no  POC update due: (10 visits /OR AUTH LIMITS, whichever is less) 54 visits or 1/10/2025     Precautions/ Contra-indications:                                                                                          Latex allergy:  NO  Pacemaker:    NO  Contraindications for Manipulation: unhealthy/ multiple comorbidities   Date of Surgery: n/a  Other:     Preferred Language for Healthcare:   [x]English       []other:      SUBJECTIVE EXAMINATION     Patient Report/Comments:  Pt states that he feels sore through his back, hands and shoulder today.  Antolin notes some difficulty with getting SOB with the cold temperatures outdoors. \"I try not to go out when it's this cold.\"        Test used Initial score  23 POC  2024   Pain Summary VAS 0-2 0/10 3/10   Functional questionnaire LEFS and Nelson Balance Scale  LEFS:   NELSON/56 LEFS:   NELSON: unable to be tested   Other:                  OBJECTIVE EXAMINATION   2024: 1 use of inhaler

## 2024-12-17 ENCOUNTER — HOSPITAL ENCOUNTER (OUTPATIENT)
Dept: PHYSICAL THERAPY | Age: 74
Setting detail: THERAPIES SERIES
Discharge: HOME | End: 2024-12-17
Payer: MEDICARE

## 2024-12-17 PROCEDURE — 97530 THERAPEUTIC ACTIVITIES: CPT

## 2024-12-17 PROCEDURE — 97110 THERAPEUTIC EXERCISES: CPT

## 2024-12-17 NOTE — FLOWSHEET NOTE
[facility] - Outpatient Rehabilitation and Therapy [address] [place] [address] office: [phone] fax: [phone]        Physical Therapy: TREATMENT/PROGRESS NOTE   Patient: Antolin Pena (74 y.o. male)   Treatment Date: 2024   :  1950 MRN: [id number]   Visit #: 76   Insurance Allowable Auth Needed   medicare []Yes    [x]No    Insurance: Payor: MEDICARE / Plan: MEDICARE PART A AND B / Product Type: *No Product type* /   Insurance ID: [id number] - (Medicare)  Secondary Insurance (if applicable): [employer] OF [place]   Treatment Diagnosis:     ICD-10-CM    1. Weakness generalized  R53.1       2. Chronic pain of left knee  M25.562     G89.29          Medical Diagnosis:    Age-related physical debility [R54]   Referring Physician: Kinza Padilla MD  PCP: Kaleb Lujan DO                             Plan of care signed (Y/N): N    Date of Patient follow up with Physician:      Progress Report/POC: no  POC update due: (10 visits /OR AUTH LIMITS, whichever is less) 54 visits or 2025     Precautions/ Contra-indications:                                                                                          Latex allergy:  NO  Pacemaker:    NO  Contraindications for Manipulation: unhealthy/ multiple comorbidities   Date of Surgery: n/a  Other:     Preferred Language for Healthcare:   [x]English       []other:      SUBJECTIVE EXAMINATION     Patient Report/Comments:  Pt states that he has some pain in knees, L arm and R wrist. States they've been bugging him for a couple weeks. States he feels like it stays the same no matter what he does.        Test used Initial score  23 POC  2024   Pain Summary VAS 0-2 0/10 3/10   Functional questionnaire LEFS and Nelson Balance Scale  LEFS:   NELSON/56 LEFS:   NELSON: unable to be tested   Other:                  OBJECTIVE EXAMINATION   2024: 1 use of inhaler during standing exercises

## 2024-12-19 ENCOUNTER — HOSPITAL ENCOUNTER (OUTPATIENT)
Dept: PHYSICAL THERAPY | Age: 74
Setting detail: THERAPIES SERIES
Discharge: HOME | End: 2024-12-19
Payer: MEDICARE

## 2024-12-19 PROCEDURE — 97110 THERAPEUTIC EXERCISES: CPT

## 2024-12-19 PROCEDURE — 97530 THERAPEUTIC ACTIVITIES: CPT

## 2024-12-19 NOTE — FLOWSHEET NOTE
SB (25)                Rotation (30)                                       HIP Flex (120)                Abd (45)                ER (50)                IR (45)                Ext (20)                                 KNEE Flex (140) 100 100 Measured in sitting          Ext (0) -5 0                                ANKLE DF (20)                PF (50)                Inversion (30)                Eversion (20)                      MMT L MMT R Notes         HIP Flexion          Abduction          ER          IR          Extension                     KNEE Flexion 3+/5 4/5      Extension 3-/5 4/5 [place] to achieve full AROM     Exercises/Interventions:     Therapeutic Ex (54696) - 30 resistance Sets/time Reps Notes/Cues/Progressions   Functional assessment       Recumbent bike 10-12  6 min            Seated fwd ball rolls green SB   Sore after   Standing TB row Blue TB 2 15    Seated no money Red TB 2 10    Seated rows Black tube      Seated hamstring curl with band Slider, green band   2 sets L, 1 set R   bridge    table   LTR    Pauses at each side   SKTC with towel behind knee       Cybex leg ext 30#   Seat 3; fatigue by end of last set          Adduction ball squeeze    Orange unweighted ball   Seated clamshell jonathan             BOSU lunge   10 bilateral   Seated hip abd as chair allows Small blue      Seated march  5-6# 3 10    Seated LAQ 5-6# 3 10    Leg press 80# 3 10 Cues to prevent lock out of knee; progress weight as able; seat 7   Cybex hamstring curl 40#   DL   Seated HR 25#, 4in box under toes 3 10 bilateral   EOB up ext No wt 2 10    Standing hip abduction 5 lb 2 10 2 UE assist at wall   Seated hip abduction Yellow band 3 10    Standing marches 5lb 2 10 1/2 wall, level surface   Standing hip abd kicks at wall 5#    UE support at 1/2 wall   Seated band rows Black looped band 2 10    Seated alt UE band punch Black looped band      TKE    3pl, cane in R hand                 Manual Intervention (73037)  TIME

## 2024-12-23 ENCOUNTER — HOSPITAL ENCOUNTER (OUTPATIENT)
Dept: PHYSICAL THERAPY | Age: 74
Setting detail: THERAPIES SERIES
Discharge: HOME | End: 2024-12-23
Payer: MEDICARE

## 2024-12-23 PROCEDURE — 97110 THERAPEUTIC EXERCISES: CPT

## 2024-12-23 PROCEDURE — 97530 THERAPEUTIC ACTIVITIES: CPT

## 2024-12-23 NOTE — FLOWSHEET NOTE
Minutes 40        Charge Justification:  (06685) THERAPEUTIC EXERCISE - Provided verbal/tactile cueing for activities related to strengthening, flexibility, endurance, ROM performed to prevent loss of range of motion, maintain or improve muscular strength or increase flexibility, following either an injury or surgery.   (73769) THERAPEUTIC ACTIVITY - use of dynamic activities to improve functional performance. (Ex include squatting, ascending/descending stairs, walking, bending, lifting, catching, throwing, pushing, pulling, jumping.)  Direct, one on one contact, billed in 15-minute increments.    TREATMENT PLAN   Plan: Cont POC- Continue emphasis/focus on exercise progression, improving proper muscle recruitment and activation/motor control patterns, static and dynamic balance, and kinesthetic sense and proprioception. Next visit plan to progress weights, progress reps, add new exercises, and progress balance ; decrease frequency soon to 1x/week  x 4 weeks; continue 1 month, if functional balance and strength do not have improvement will transition to HEP.   Electronically Signed by Nikkie Collado, PT              Date: 12/23/2024     Note: If patient does not return for scheduled/recommended follow up visits, this note will serve as a discharge from care along with the most recent update on progress.

## 2024-12-26 ENCOUNTER — HOSPITAL ENCOUNTER (OUTPATIENT)
Dept: PHYSICAL THERAPY | Age: 74
Setting detail: THERAPIES SERIES
Discharge: HOME | End: 2024-12-26
Payer: MEDICARE

## 2024-12-26 PROCEDURE — 97110 THERAPEUTIC EXERCISES: CPT

## 2024-12-26 PROCEDURE — 97530 THERAPEUTIC ACTIVITIES: CPT

## 2024-12-26 NOTE — FLOWSHEET NOTE
[facility] - Outpatient Rehabilitation and Therapy [address] [place] [address] office: [phone] fax: [phone]        Physical Therapy: TREATMENT/PROGRESS NOTE   Patient: Antolin Pena (74 y.o. male)   Treatment Date: 2024   :  1950 MRN: [id number]   Visit #: 79   Insurance Allowable Auth Needed   medicare []Yes    [x]No    Insurance: Payor: MEDICARE / Plan: MEDICARE PART A AND B / Product Type: *No Product type* /   Insurance ID: [id number] - (Medicare)  Secondary Insurance (if applicable): [employer] [place]   Treatment Diagnosis:     ICD-10-CM    1. Weakness generalized  R53.1       2. Chronic pain of left knee  M25.562     G89.29          Medical Diagnosis:    Age-related physical debility [R54]   Referring Physician: Kinza Padilla MD  PCP: Kaleb Lujan DO                             Plan of care signed (Y/N): N    Date of Patient follow up with Physician:      Progress Report/POC: no  POC update due: (10 visits /OR AUTH LIMITS, whichever is less) 54 visits or 2025     Precautions/ Contra-indications:                                                                                          Latex allergy:  NO  Pacemaker:    NO  Contraindications for Manipulation: unhealthy/ multiple comorbidities   Date of Surgery: n/a  Other:     Preferred Language for Healthcare:   [x]English       []other:      SUBJECTIVE EXAMINATION     Patient Report/Comments:  Pt reports he got a shot from his doctor for his bronchitis and is feeling a little better. Antolin states that he has not been using his walker in the house, but uses it when he is outside.  \"When can I get rid of this?\"     Test used Initial score  23 POC  2024   Pain Summary VAS 0-2 0/10 210   Functional questionnaire LEFS and Nelson Balance Scale  LEFS:   NELSON/56 LEFS:   NELSON: unable to be tested   Other:                  OBJECTIVE EXAMINATION   2024:

## 2024-12-31 ENCOUNTER — HOSPITAL ENCOUNTER (OUTPATIENT)
Dept: PHYSICAL THERAPY | Age: 74
Setting detail: THERAPIES SERIES
Discharge: HOME | End: 2024-12-31
Payer: MEDICARE

## 2024-12-31 PROCEDURE — 97110 THERAPEUTIC EXERCISES: CPT

## 2024-12-31 PROCEDURE — 97530 THERAPEUTIC ACTIVITIES: CPT

## 2024-12-31 NOTE — FLOWSHEET NOTE
[facility] - Outpatient Rehabilitation and Therapy [address] [place] [address] office: [phone] fax: [phone]        Physical Therapy: TREATMENT/PROGRESS NOTE   Patient: Antolin Pena (74 y.o. male)   Treatment Date: 2024   :  1950 MRN: [id number]   Visit #: 80   Insurance Allowable Auth Needed   medicare []Yes    [x]No    Insurance: Payor: MEDICARE / Plan: MEDICARE PART A AND B / Product Type: *No Product type* /   Insurance ID: [id number] - (Medicare)  Secondary Insurance (if applicable): [employer] OF [place]   Treatment Diagnosis:     ICD-10-CM    1. Weakness generalized  R53.1       2. Chronic pain of left knee  M25.562     G89.29          Medical Diagnosis:    Age-related physical debility [R54]   Referring Physician: Kinza Padilla MD  PCP: Kaleb Lujan DO                             Plan of care signed (Y/N): N    Date of Patient follow up with Physician:      Progress Report/POC: no  POC update due: (10 visits /OR AUTH LIMITS, whichever is less) 54 visits or 2025     Precautions/ Contra-indications:                                                                                          Latex allergy:  NO  Pacemaker:    NO  Contraindications for Manipulation: unhealthy/ multiple comorbidities   Date of Surgery: n/a  Other:     Preferred Language for Healthcare:   [x]English       []other:      SUBJECTIVE EXAMINATION     Patient Report/Comments:  Pt reports that he feels ok.  He was able to walk a short distance in [employer] without his walker. \"I parked close and left the walker at the car since it's hard to walk in tight spaces with the walker.\"      Test used Initial score  23 POC  2024   Pain Summary VAS 0-2 0/10 210   Functional questionnaire LEFS and Nelson Balance Scale  LEFS:   NELSON LEFS:   NELSON: unable to be tested   Other:                  OBJECTIVE EXAMINATION   2024: 1 use of

## 2025-01-02 ENCOUNTER — HOSPITAL ENCOUNTER (OUTPATIENT)
Dept: PHYSICAL THERAPY | Age: 75
Setting detail: THERAPIES SERIES
Discharge: HOME | End: 2025-01-02
Payer: MEDICARE

## 2025-01-02 PROCEDURE — 97110 THERAPEUTIC EXERCISES: CPT

## 2025-01-02 PROCEDURE — 97530 THERAPEUTIC ACTIVITIES: CPT

## 2025-01-02 NOTE — FLOWSHEET NOTE
[facility] - Outpatient Rehabilitation and Therapy [address] [place] [address] office: [phone] fax: [phone]        Physical Therapy: TREATMENT/PROGRESS NOTE   Patient: Antolin Pena (74 y.o. male)   Treatment Date: 2025   :  1950 MRN: [id number]   Visit #: 81   Insurance Allowable Auth Needed   medicare []Yes    [x]No    Insurance: Payor: MEDICARE / Plan: MEDICARE PART A AND B / Product Type: *No Product type* /   Insurance ID: [id number] - (Medicare)  Secondary Insurance (if applicable): [employer] OF [place]   Treatment Diagnosis:     ICD-10-CM    1. Weakness generalized  R53.1       2. Chronic pain of left knee  M25.562     G89.29          Medical Diagnosis:    Age-related physical debility [R54]   Referring Physician: Kinza Padilla MD  PCP: Kaleb Lujan DO                             Plan of care signed (Y/N): N    Date of Patient follow up with Physician:      Progress Report/POC: no  POC update due: (10 visits /OR AUTH LIMITS, whichever is less) 54 visits or 2025     Precautions/ Contra-indications:                                                                                          Latex allergy:  NO  Pacemaker:    NO  Contraindications for Manipulation: unhealthy/ multiple comorbidities   Date of Surgery: n/a  Other:     Preferred Language for Healthcare:   [x]English       []other:      SUBJECTIVE EXAMINATION     Patient Report/Comments:  Pt reports he is feeling okay today, states nothing is giving him too much trouble this morning other than the weather.     Test used Initial score  23 POC  2025   Pain Summary VAS 0-2 0/10 1/10   Functional questionnaire LEFS and Nelson Balance Scale  LEFS:   NELSON/56 LEFS:   NELSON: unable to be tested   Other:                  OBJECTIVE EXAMINATION   2024: 1 use of inhaler during standing exercises today.     Observation: Pt ambulates in clinic with

## 2025-01-07 ENCOUNTER — APPOINTMENT (OUTPATIENT)
Dept: PHYSICAL THERAPY | Age: 75
End: 2025-01-07
Payer: MEDICARE

## 2025-01-09 ENCOUNTER — HOSPITAL ENCOUNTER (OUTPATIENT)
Dept: PHYSICAL THERAPY | Age: 75
Setting detail: THERAPIES SERIES
Discharge: HOME | End: 2025-01-09
Payer: MEDICARE

## 2025-01-09 PROCEDURE — 97530 THERAPEUTIC ACTIVITIES: CPT

## 2025-01-09 PROCEDURE — 97110 THERAPEUTIC EXERCISES: CPT

## 2025-01-09 NOTE — PLAN OF CARE
progress weights, progress reps, add new exercises, and progress balance ; decrease frequency soon to 1x/week  x 4 weeks; continue 1 month, if functional balance and strength do not have improvement will transition to HEP.   Electronically Signed by Nikkie Collado, PT              Date: 01/09/2025     Note: If patient does not return for scheduled/recommended follow up visits, this note will serve as a discharge from care along with the most recent update on progress.

## 2025-01-14 ENCOUNTER — HOSPITAL ENCOUNTER (OUTPATIENT)
Dept: PHYSICAL THERAPY | Age: 75
Setting detail: THERAPIES SERIES
Discharge: HOME | End: 2025-01-14
Payer: MEDICARE

## 2025-01-14 PROCEDURE — 97530 THERAPEUTIC ACTIVITIES: CPT

## 2025-01-14 PROCEDURE — 97110 THERAPEUTIC EXERCISES: CPT

## 2025-01-14 NOTE — FLOWSHEET NOTE
[facility] - Outpatient Rehabilitation and Therapy [address] [place] [address] office: [phone] fax: [phone]        Physical Therapy: TREATMENT/PROGRESS NOTE   Patient: Antolin Pena (74 y.o. male)   Treatment Date: 2025   :  1950 MRN: [id number]   Visit #: 83   Insurance Allowable Auth Needed   medicare []Yes    [x]No    Insurance: Payor: MEDICARE / Plan: MEDICARE PART A AND B / Product Type: *No Product type* /   Insurance ID: [id number] - (Medicare)  Secondary Insurance (if applicable): [employer] OF [place]   Treatment Diagnosis:     ICD-10-CM    1. Weakness generalized  R53.1       2. Chronic pain of left knee  M25.562     G89.29          Medical Diagnosis:    Age-related physical debility [R54]   Referring Physician: Kinza Padilla MD  PCP: Kaleb Lujan DO                             Plan of care signed (Y/N): N    Date of Patient follow up with Physician:      Progress Report/POC: no  POC update due: (10 visits /OR AUTH LIMITS, whichever is less) 54 visits or 2025     Precautions/ Contra-indications:                                                                                          Latex allergy:  NO  Pacemaker:    NO  Contraindications for Manipulation: unhealthy/ multiple comorbidities   Date of Surgery: n/a  Other:     Preferred Language for Healthcare:   [x]English       []other:      SUBJECTIVE EXAMINATION     Patient Report/Comments:  Pt reports he is feeling okay today, no issues after last session. Has a little stiffness this morning with the cold weather. States he needs to start working on steps and carrying things again.     Test used Initial score  2025   Pain Summary VAS 0-2 0/10   Functional questionnaire LEFS and Nelson Balance Scale   2856 LEFS: 32  NELSON/56   Other:                OBJECTIVE EXAMINATION       Observation:     Test measurements:     2025      MMT L MMT R Notes   Shoulder flexion 4/5

## 2025-01-16 ENCOUNTER — HOSPITAL ENCOUNTER (OUTPATIENT)
Dept: PHYSICAL THERAPY | Age: 75
Setting detail: THERAPIES SERIES
Discharge: HOME | End: 2025-01-16
Payer: MEDICARE

## 2025-01-16 PROCEDURE — 97530 THERAPEUTIC ACTIVITIES: CPT

## 2025-01-16 PROCEDURE — 97110 THERAPEUTIC EXERCISES: CPT

## 2025-01-16 NOTE — FLOWSHEET NOTE
[facility] - Outpatient Rehabilitation and Therapy [address] [place] [address] office: [phone] fax: [phone]        Physical Therapy: TREATMENT/PROGRESS NOTE   Patient: Antolin Pena (74 y.o. male)   Treatment Date: 2025   :  1950 MRN: [id number]   Visit #: 84   Insurance Allowable Auth Needed   medicare []Yes    [x]No    Insurance: Payor: MEDICARE / Plan: MEDICARE PART A AND B / Product Type: *No Product type* /   Insurance ID: [id number] - (Medicare)  Secondary Insurance (if applicable): [employer] OF [place]   Treatment Diagnosis:     ICD-10-CM    1. Weakness generalized  R53.1       2. Chronic pain of left knee  M25.562     G89.29          Medical Diagnosis:    Age-related physical debility [R54]   Referring Physician: Kinza Padilla MD  PCP: Kaleb Lujan DO                             Plan of care signed (Y/N): N    Date of Patient follow up with Physician:      Progress Report/POC: no  POC update due: (10 visits /OR AUTH LIMITS, whichever is less) 54 visits or 2025     Precautions/ Contra-indications:                                                                                          Latex allergy:  NO  Pacemaker:    NO  Contraindications for Manipulation: unhealthy/ multiple comorbidities   Date of Surgery: n/a  Other:     Preferred Language for Healthcare:   [x]English       []other:      SUBJECTIVE EXAMINATION     Patient Report/Comments:  Pt reports he is feeling okay overall. Doing ok with breathing while walking outside as long as the air isn't too cold or dry.  Antolin wants to start working more on steps so that he can climb onto a tractor.     Test used Initial score  2025   Pain Summary VAS 0-2 0/10   Functional questionnaire LEFS and Nelson Balance Scale  LEFS:   NELSON/56   Other:                OBJECTIVE EXAMINATION       Observation:     Test measurements:     2025      MMT L MMT R Notes

## 2025-01-21 ENCOUNTER — APPOINTMENT (OUTPATIENT)
Dept: PHYSICAL THERAPY | Age: 75
End: 2025-01-21
Payer: MEDICARE

## 2025-01-23 ENCOUNTER — APPOINTMENT (OUTPATIENT)
Dept: PHYSICAL THERAPY | Age: 75
End: 2025-01-23
Payer: MEDICARE

## 2025-01-28 ENCOUNTER — HOSPITAL ENCOUNTER (OUTPATIENT)
Dept: PHYSICAL THERAPY | Age: 75
Setting detail: THERAPIES SERIES
Discharge: HOME | End: 2025-01-28
Payer: MEDICARE

## 2025-01-28 PROCEDURE — 97530 THERAPEUTIC ACTIVITIES: CPT

## 2025-01-28 PROCEDURE — 97110 THERAPEUTIC EXERCISES: CPT

## 2025-01-28 NOTE — FLOWSHEET NOTE
sparingly.    Treatment/Activity Tolerance:  [x] Patient tolerated treatment well [] Patient limited by fatique  [] Patient limited by pain  [x] Patient limited by other medical complications  [] Other:     Return to Play: NA    Prognosis for POC: [] Good [x] Fair  [] Poor    Patient requires continued skilled intervention: [x] Yes  [] No      GOALS     Patient stated goal: \"get in and out of tractor, get stronger\"  Status: [] Progressing: [] Met: [x] Not Met: [] Adjusted     Therapist goals for Patient:   Short Term Goals: To be achieved in: 2 weeks  Independent in HEP and progression per patient tolerance, in order to progress toward full function and prevent re-injury.               Status: [] Progressing: [x] Met: [] Not Met: [] Adjusted  Patient will have a decrease in pain to 0/10 to help  facilitate improvement in movement, function, and ADLs as indicated by functional deficits.              Status: [] Progressing: [] Met: [x] Not Met: [] Adjusted     Long Term Goals: To be achieved in: 4-6 weeks  Disability index score of 52 or more for the Hardy Balance Scale to assist with return to prior level of function.                     Status: [x] Progressing: [] Met: [] Not Met: [] Adjusted  Improve knee AROM Flexion to WFL to allow for proper joint functioning as indicated by patients functional deficits.  Status: [] Progressing: [x] Met: [] Not Met: [] Adjusted  Pt to improve strength to 4+/5 or better of quadriceps to allow for proper muscle and joint use in functional mobility, ADLs and prior level of function  Status: [] Progressing: [x] Met: [] Not Met: [] Adjusted  Patient will return to putting shoes/socks on without increased symptoms or restriction to work towards return to prior level of function.  Status: [x] Progressing: [] Met: [] Not Met: [] Adjusted  Pt will be able to go up and down 4 8 inch steps safely and without assist or supervision to mimic being able to get in and out of tractor.

## 2025-01-30 ENCOUNTER — APPOINTMENT (OUTPATIENT)
Dept: PHYSICAL THERAPY | Age: 75
End: 2025-01-30
Payer: MEDICARE

## 2025-02-04 ENCOUNTER — HOSPITAL ENCOUNTER (OUTPATIENT)
Dept: PHYSICAL THERAPY | Age: 75
Setting detail: THERAPIES SERIES
Discharge: HOME | End: 2025-02-04
Payer: MEDICARE

## 2025-02-04 PROCEDURE — 97110 THERAPEUTIC EXERCISES: CPT

## 2025-02-04 NOTE — FLOWSHEET NOTE
[facility] - Outpatient Rehabilitation and Therapy [address] [place] [address] office: [phone] fax: [phone]        Physical Therapy: TREATMENT/PROGRESS NOTE   Patient: Antolin Pena (74 y.o. male)   Treatment Date: 2025   :  1950 MRN: [id number]   Visit #: 86   Insurance Allowable Auth Needed   medicare []Yes    [x]No    Insurance: Payor: MEDICARE / Plan: MEDICARE PART A AND B / Product Type: *No Product type* /   Insurance ID: [id number] - (Medicare)  Secondary Insurance (if applicable): [employer] OF [place]   Treatment Diagnosis:     ICD-10-CM    1. Weakness generalized  R53.1       2. Chronic pain of left knee  M25.562     G89.29          Medical Diagnosis:    Age-related physical debility [R54]   Referring Physician: Kinza Padilla MD  PCP: Kaleb Lujan DO                             Plan of care signed (Y/N): N      Date of Patient follow up with Physician:      Progress Report/POC: no  POC update due: (10 visits /OR AUTH LIMITS, whichever is less) 54 visits or 3/6/2025     Precautions/ Contra-indications:                                                                                          Latex allergy:  NO  Pacemaker:    NO  Contraindications for Manipulation: unhealthy/ multiple comorbidities   Date of Surgery: n/a  Other:     Preferred Language for Healthcare:   [x]English       []other:      SUBJECTIVE EXAMINATION     Patient Report/Comments:  Pt reports that his L wrist is sore. It was hurting bad enough that he couldn't use it much yesterday.  Feeling some better since recent illness.  Still not full stamina yet.  Antolin notes that he would like to work on balance in upcoming visits as his balance is the first thing to go when he gets sick.      Test used Initial score  2025   Pain Summary VAS 0-2 0/10   Functional questionnaire LEFS and Nelson Balance Scale  LEFS:   NELSON/56   Other:                OBJECTIVE

## 2025-02-06 ENCOUNTER — HOSPITAL ENCOUNTER (OUTPATIENT)
Dept: PHYSICAL THERAPY | Age: 75
Setting detail: THERAPIES SERIES
Discharge: HOME | End: 2025-02-06
Payer: MEDICARE

## 2025-02-06 PROCEDURE — 97110 THERAPEUTIC EXERCISES: CPT

## 2025-02-06 PROCEDURE — 97530 THERAPEUTIC ACTIVITIES: CPT

## 2025-02-06 NOTE — FLOWSHEET NOTE
[facility] - Outpatient Rehabilitation and Therapy [address] [place] [address] office: [phone] fax: [phone]        Physical Therapy: TREATMENT/PROGRESS NOTE   Patient: Antolin Pena (74 y.o. male)   Treatment Date: 2025   :  1950 MRN: [id number]   Visit #: 87   Insurance Allowable Auth Needed   medicare []Yes    [x]No    Insurance: Payor: MEDICARE / Plan: MEDICARE PART A AND B / Product Type: *No Product type* /   Insurance ID: [id number] - (Medicare)  Secondary Insurance (if applicable): [employer] OF [place]   Treatment Diagnosis:     ICD-10-CM    1. Weakness generalized  R53.1       2. Chronic pain of left knee  M25.562     G89.29          Medical Diagnosis:    Age-related physical debility [R54]   Referring Physician: Kinza Padilla MD  PCP: Kaleb Lujan DO                             Plan of care signed (Y/N): N      Date of Patient follow up with Physician:      Progress Report/POC: no  POC update due: (10 visits /OR AUTH LIMITS, whichever is less) 54 visits or 3/7/2025     Precautions/ Contra-indications:                                                                                          Latex allergy:  NO  Pacemaker:    NO  Contraindications for Manipulation: unhealthy/ multiple comorbidities   Date of Surgery: n/a  Other:     Preferred Language for Healthcare:   [x]English       []other:      SUBJECTIVE EXAMINATION     Patient Report/Comments:  Pt reports that his L wrist is doing better. \"It's still sore, but not like it was.\"  Antolin states that he is feeling a little better overall today; more energy.        Test used Initial score  2025   Pain Summary VAS 0-2 0/10   Functional questionnaire LEFS and Nelson Balance Scale  LEFS:   [id number]   Other:                OBJECTIVE EXAMINATION       Observation:     Test measurements:     2025      MMT L MMT R Notes   Shoulder flexion  4/     abduction  4/5

## 2025-02-11 ENCOUNTER — HOSPITAL ENCOUNTER (OUTPATIENT)
Dept: PHYSICAL THERAPY | Age: 75
Setting detail: THERAPIES SERIES
Discharge: HOME | End: 2025-02-11
Payer: MEDICARE

## 2025-02-11 PROCEDURE — 97110 THERAPEUTIC EXERCISES: CPT

## 2025-02-11 PROCEDURE — 97530 THERAPEUTIC ACTIVITIES: CPT

## 2025-02-11 NOTE — FLOWSHEET NOTE
[facility] - Outpatient Rehabilitation and Therapy [address] [place] [address] office: [phone] fax: [phone]        Physical Therapy: TREATMENT/PROGRESS NOTE   Patient: Antolin Pena (74 y.o. male)   Treatment Date: 2025   :  1950 MRN: [id number]   Visit #: 88   Insurance Allowable Auth Needed   medicare []Yes    [x]No    Insurance: Payor: MEDICARE / Plan: MEDICARE PART A AND B / Product Type: *No Product type* /   Insurance ID: [id number] - (Medicare)  Secondary Insurance (if applicable): [employer] OF [place]   Treatment Diagnosis:     ICD-10-CM    1. Weakness generalized  R53.1       2. Chronic pain of left knee  M25.562     G89.29          Medical Diagnosis:    Age-related physical debility [R54]   Referring Physician: Kinza Padilla MD  PCP: Kaleb Lujan DO                             Plan of care signed (Y/N): N      Date of Patient follow up with Physician:      Progress Report/POC: no  POC update due: (10 visits /OR AUTH LIMITS, whichever is less) 54 visits or 3/13/2025     Precautions/ Contra-indications:                                                                                          Latex allergy:  NO  Pacemaker:    NO  Contraindications for Manipulation: unhealthy/ multiple comorbidities   Date of Surgery: n/a  Other:     Preferred Language for Healthcare:   [x]English       []other:      SUBJECTIVE EXAMINATION     Patient Report/Comments:  Pt reports that both knees hurt today. \"The left knee is worse.\" Antolin can't think of anything specific that caused this flare up. Will to do what he can today.        Test used Initial score  2025   Pain Summary VAS 0-2 0/10   Functional questionnaire LEFS and Nelson Balance Scale   2856 LEFS: 32  NELSON/56   Other:                OBJECTIVE EXAMINATION   25: 2 puffs of inhaler used during session to assist with breathing difficulty.     Observation:     Test measurements:

## 2025-02-13 ENCOUNTER — HOSPITAL ENCOUNTER (OUTPATIENT)
Dept: PHYSICAL THERAPY | Age: 75
Setting detail: THERAPIES SERIES
Discharge: HOME | End: 2025-02-13
Payer: MEDICARE

## 2025-02-13 PROCEDURE — 97530 THERAPEUTIC ACTIVITIES: CPT

## 2025-02-13 PROCEDURE — 97110 THERAPEUTIC EXERCISES: CPT

## 2025-02-13 NOTE — FLOWSHEET NOTE
static and dynamic balance, and kinesthetic sense and proprioception. Next visit plan to progress weights, progress reps, add new exercises, and progress balance ; decrease frequency soon to 1x/week  x 4 weeks; continue 1 month, if functional balance and strength do not have improvement will transition to HEP.   Electronically Signed by Susy Everett, PTA              Date: 02/13/2025     Note: If patient does not return for scheduled/recommended follow up visits, this note will serve as a discharge from care along with the most recent update on progress.

## 2025-02-18 ENCOUNTER — HOSPITAL ENCOUNTER (OUTPATIENT)
Dept: PHYSICAL THERAPY | Age: 75
Setting detail: THERAPIES SERIES
Discharge: HOME | End: 2025-02-18
Payer: MEDICARE

## 2025-02-18 PROCEDURE — 97530 THERAPEUTIC ACTIVITIES: CPT

## 2025-02-18 PROCEDURE — 97110 THERAPEUTIC EXERCISES: CPT

## 2025-02-18 NOTE — PLAN OF CARE
[facility] - Outpatient Rehabilitation and Therapy [address] [place] [address] office: [phone] fax: [phone]        Physical Therapy Re-Certification Plan of Care/MD UPDATE    Dear  Dr. Kinza Padilla,    We had the pleasure of treating the following patient for physical therapy services at [facility].  A summary of our findings can be found in the updated assessment below.  This includes our plan of care.  If you have any questions or concerns regarding these findings, please do not hesitate to contact me at the office phone number checked above.  Thank you for the referral.     Physician Signature:________________________________Date:__________________  By signing above (or electronic signature), therapist’s plan is approved by physician      Current Functional Status:   Antolin Pena 1950 continues to present with functional deficits in strength symmetry, endurance of strength, cardiovascular endurance, and eccentric control  limiting ability with walking on uneven ground, managing community ambulation, walking up/down stairs, heavy home activity, and yardwork .  During therapy this date, patient required visual cueing, verbal cueing, modification of technique, and progression of exercises and program for exercise progression, improving proper muscle recruitment and activation/motor control patterns, static and dynamic balance, and improving postural awareness. Patient will continue to benefit from ongoing evaluation and advanced clinical decision from a Physical Therapist to improve muscle strength, endurance, normalization of gait, and balance and proprioception to safely return to PLOF, ADLs/IADLs, and work/work related tasks without symptoms or restrictions.    Overall Response to Treatment:   []Patient is responding well to treatment and improvement is noted with regards to goals   []Patient should continue to improve in reasonable time if they

## 2025-02-20 ENCOUNTER — HOSPITAL ENCOUNTER (OUTPATIENT)
Dept: PHYSICAL THERAPY | Age: 75
Setting detail: THERAPIES SERIES
Discharge: HOME | End: 2025-02-20
Payer: MEDICARE

## 2025-02-20 PROCEDURE — 97530 THERAPEUTIC ACTIVITIES: CPT

## 2025-02-20 PROCEDURE — 97110 THERAPEUTIC EXERCISES: CPT

## 2025-02-20 NOTE — FLOWSHEET NOTE
[facility] - Outpatient Rehabilitation and Therapy [address] [place] [address] office: [phone] fax: [phone]          Physical Therapy: TREATMENT/PROGRESS NOTE   Patient: Antolin Pena (74 y.o. male)   Treatment Date: 2025   :  1950 MRN: [id number]   Visit #: 91   Insurance Allowable Auth Needed   medicare []Yes    [x]No    Insurance: Payor: MEDICARE / Plan: MEDICARE PART A AND B / Product Type: *No Product type* /   Insurance ID: [id number] - (Medicare)  Secondary Insurance (if applicable): [employer] OF [place]   Treatment Diagnosis:     ICD-10-CM    1. Weakness generalized  R53.1       2. Chronic pain of left knee  M25.562     G89.29          Medical Diagnosis:    Age-related physical debility [R54]   Referring Physician: Kinza Padilla MD  PCP: Kaleb Lujan DO                             Plan of care signed (Y/N): N      Date of Patient follow up with Physician:      Progress Report/POC: no  POC update due: (10 visits /OR AUTH LIMITS, whichever is less) 54 visits or 3/21/2025     Precautions/ Contra-indications:                                                                                          Latex allergy:  NO  Pacemaker:    NO  Contraindications for Manipulation: unhealthy/ multiple comorbidities   Date of Surgery: n/a  Other:     Preferred Language for Healthcare:   [x]English       []other:      SUBJECTIVE EXAMINATION     Patient Report/Comments:  Pt reports both knees are doing better this week. He notes feeling pain in both hands, R worse than L.  \"I haven't been using my walker as much in the house, but I thought I'd better have it today with the snow.\"        Test used Initial score  2025   Pain Summary VAS 0-2 0/10   Functional questionnaire LEFS and Nelson Balance Scale  LEFS:   NELSON56   Other:                OBJECTIVE EXAMINATION   25: 2 puffs of inhaler used during session to assist with breathing

## 2025-02-25 ENCOUNTER — HOSPITAL ENCOUNTER (OUTPATIENT)
Dept: PHYSICAL THERAPY | Age: 75
Setting detail: THERAPIES SERIES
Discharge: HOME | End: 2025-02-25
Payer: MEDICARE

## 2025-02-25 PROCEDURE — 97110 THERAPEUTIC EXERCISES: CPT

## 2025-02-25 PROCEDURE — 97530 THERAPEUTIC ACTIVITIES: CPT

## 2025-02-25 NOTE — FLOWSHEET NOTE
[facility] - Outpatient Rehabilitation and Therapy [address] [place] [address] office: [phone] fax: [phone]          Physical Therapy: TREATMENT/PROGRESS NOTE   Patient: Antolin Pena (74 y.o. male)   Treatment Date: 2025   :  1950 MRN: [id number]   Visit #: 92   Insurance Allowable Auth Needed   medicare []Yes    [x]No    Insurance: Payor: MEDICARE / Plan: MEDICARE PART A AND B / Product Type: *No Product type* /   Insurance ID: [id number] - (Medicare)  Secondary Insurance (if applicable): [employer] OF [place]   Treatment Diagnosis:     ICD-10-CM    1. Weakness generalized  R53.1       2. Chronic pain of left knee  M25.562     G89.29          Medical Diagnosis:    Age-related physical debility [R54]   Referring Physician: Kinza Padilla MD  PCP: Kaleb Lujan DO                             Plan of care signed (Y/N): N      Date of Patient follow up with Physician:      Progress Report/POC: no  POC update due: (10 visits /OR AUTH LIMITS, whichever is less) 54 visits or 3/27/2025     Precautions/ Contra-indications:                                                                                          Latex allergy:  NO  Pacemaker:    NO  Contraindications for Manipulation: unhealthy/ multiple comorbidities   Date of Surgery: n/a  Other:     Preferred Language for Healthcare:   [x]English       []other:      SUBJECTIVE EXAMINATION     Patient Report/Comments:  Pt states he is feeling okay but did go to his doctor yesterday and has a small bout of pneumonia. Does have his inhaler if he needs it. States his O2 was 96% this morning when he checked it.        Test used Initial score  2025   Pain Summary VAS 0-2 0/10   Functional questionnaire LEFS and Nelson Balance Scale  LEFS:   NELSON/56   Other:                OBJECTIVE EXAMINATION     Observation:     Test measurements:     2025      MMT L MMT R Notes   Shoulder flexion

## 2025-02-27 ENCOUNTER — HOSPITAL ENCOUNTER (OUTPATIENT)
Dept: PHYSICAL THERAPY | Age: 75
Setting detail: THERAPIES SERIES
Discharge: HOME | End: 2025-02-27
Payer: MEDICARE

## 2025-02-27 PROCEDURE — 97530 THERAPEUTIC ACTIVITIES: CPT

## 2025-02-27 PROCEDURE — 97110 THERAPEUTIC EXERCISES: CPT

## 2025-02-27 NOTE — FLOWSHEET NOTE
[facility] - Outpatient Rehabilitation and Therapy [address] [place] [address] office: [phone] fax: [phone]          Physical Therapy: TREATMENT/PROGRESS NOTE   Patient: Antolin Pena (74 y.o. male)   Treatment Date: 2025   :  1950 MRN: [id number]   Visit #: 93   Insurance Allowable Auth Needed   medicare []Yes    [x]No    Insurance: Payor: MEDICARE / Plan: MEDICARE PART A AND B / Product Type: *No Product type* /   Insurance ID: [id number] - (Medicare)  Secondary Insurance (if applicable): [employer] OF [place]   Treatment Diagnosis:     ICD-10-CM    1. Weakness generalized  R53.1       2. Chronic pain of left knee  M25.562     G89.29          Medical Diagnosis:    Age-related physical debility [R54]   Referring Physician: Kinza Padilla MD  PCP: Kaleb Lujan DO                             Plan of care signed (Y/N): N      Date of Patient follow up with Physician:      Progress Report/POC: no  POC update due: (10 visits /OR AUTH LIMITS, whichever is less) 54 visits or 3/28/2025     Precautions/ Contra-indications:                                                                                          Latex allergy:  NO  Pacemaker:    NO  Contraindications for Manipulation: unhealthy/ multiple comorbidities   Date of Surgery: n/a  Other:     Preferred Language for Healthcare:   [x]English       []other:      SUBJECTIVE EXAMINATION     Patient Report/Comments:  Pt states that he was unable to sleep last night past a few hours.  He states that he doesn't feel much different energy level wise despite not sleeping well last night. He has been using the cane more frequently, but will use his walker when he goes out to the barn to make feed.            Test used Initial score  2025   Pain Summary VAS 0-2 0/10   Functional questionnaire LEFS and Nelson Balance Scale  LEFS:   NELSON/56   Other:                OBJECTIVE EXAMINATION

## 2025-03-04 ENCOUNTER — HOSPITAL ENCOUNTER (OUTPATIENT)
Dept: PHYSICAL THERAPY | Age: 75
Setting detail: THERAPIES SERIES
Discharge: HOME | End: 2025-03-04
Payer: MEDICARE

## 2025-03-04 PROCEDURE — 97112 NEUROMUSCULAR REEDUCATION: CPT

## 2025-03-04 PROCEDURE — 97110 THERAPEUTIC EXERCISES: CPT

## 2025-03-04 PROCEDURE — 97530 THERAPEUTIC ACTIVITIES: CPT

## 2025-03-04 NOTE — FLOWSHEET NOTE
[facility] - Outpatient Rehabilitation and Therapy [address] [place] [address] office: [phone] fax: [phone]          Physical Therapy: TREATMENT/PROGRESS NOTE   Patient: Antolin Pena (74 y.o. male)   Treatment Date: 2025   :  1950 MRN: [id number]   Visit #: 94   Insurance Allowable Auth Needed   medicare []Yes    [x]No    Insurance: Payor: MEDICARE / Plan: MEDICARE PART A AND B / Product Type: *No Product type* /   Insurance ID: [id number] - (Medicare)  Secondary Insurance (if applicable): [employer] OF [place]   Treatment Diagnosis:     ICD-10-CM    1. Weakness generalized  R53.1       2. Chronic pain of left knee  M25.562     G89.29          Medical Diagnosis:    Age-related physical debility [R54]   Referring Physician: Kinza Padilla MD  PCP: Kaleb Lujan DO                             Plan of care signed (Y/N): N      Date of Patient follow up with Physician:      Progress Report/POC: no  POC update due: (10 visits /OR AUTH LIMITS, whichever is less) 54 visits or 4/3/2025     Precautions/ Contra-indications:                                                                                          Latex allergy:  NO  Pacemaker:    NO  Contraindications for Manipulation: unhealthy/ multiple comorbidities   Date of Surgery: n/a  Other:     Preferred Language for Healthcare:   [x]English       []other:      SUBJECTIVE EXAMINATION     Patient Report/Comments:  Pt states that he did ok over the weekend. He uses the walker when outdoors on uneven ground. \"I think my pneumonia is almost gone.\"  Antolin agrees to work more on balance today.        Test used Initial score  2025   Pain Summary VAS 0-2 0/10   Functional questionnaire LEFS and Nelson Balance Scale  LEFS: 32  NELSON/56   Other:                OBJECTIVE EXAMINATION     Observation:     Test measurements:     2025      MMT L MMT R Notes   Shoulder flexion

## 2025-03-06 ENCOUNTER — HOSPITAL ENCOUNTER (OUTPATIENT)
Dept: PHYSICAL THERAPY | Age: 75
Setting detail: THERAPIES SERIES
Discharge: HOME | End: 2025-03-06
Payer: MEDICARE

## 2025-03-06 PROCEDURE — 97110 THERAPEUTIC EXERCISES: CPT

## 2025-03-06 PROCEDURE — 97530 THERAPEUTIC ACTIVITIES: CPT

## 2025-03-06 NOTE — FLOWSHEET NOTE
GRID   CPT Code (TIMED) minutes # CPT Code (UNTIMED) #     Therex (83605)  23 2  EVAL:MODERATE (52700 - Typically 30 minutes face-to-face)     Neuromusc. Re-ed (11532) 6   Re-Eval (08242)     Manual (77025)    Estim Unattended (27830)     Ther. Act (81127) 10 1  Mech. Traction (54412)     Gait (76334)    Dry Needle 1-2 muscle (82321)     Aquatic Therex (15296)    Dry Needle 3+ muscle (20561)     Iontophoresis (18056)    VASO (53558)     Ultrasound (32351)    Group Therapy (56202)     Estim Attended (91816)    Canalith Repositioning (73958)     Other:    Other:    Total Timed Code Tx Minutes 39 3       Total Treatment Minutes 46        Charge Justification:  (84895) THERAPEUTIC EXERCISE - Provided verbal/tactile cueing for activities related to strengthening, flexibility, endurance, ROM performed to prevent loss of range of motion, maintain or improve muscular strength or increase flexibility, following either an injury or surgery.   (99740) THERAPEUTIC ACTIVITY - use of dynamic activities to improve functional performance. (Ex include squatting, ascending/descending stairs, walking, bending, lifting, catching, throwing, pushing, pulling, jumping.)  Direct, one on one contact, billed in 15-minute increments.    TREATMENT PLAN   Plan: Cont POC- Continue emphasis/focus on exercise progression, improving proper muscle recruitment and activation/motor control patterns, static and dynamic balance, and kinesthetic sense and proprioception. Next visit plan to progress weights, progress reps, add new exercises, and progress balance ; decrease frequency soon to 1x/week  x 4 weeks; continue 1 month, if functional balance and strength do not have improvement will transition to HEP.  Pt fatigued at conclusion, but not SOB. Tolerance to sit to stand from lower surface height is improving; less UE use needed.   Electronically Signed by Susy Everett PTA              Date: 03/06/2025     Note: If patient does not return for

## 2025-03-11 ENCOUNTER — HOSPITAL ENCOUNTER (OUTPATIENT)
Dept: PHYSICAL THERAPY | Age: 75
Setting detail: THERAPIES SERIES
Discharge: HOME | End: 2025-03-11
Payer: MEDICARE

## 2025-03-11 PROCEDURE — 97110 THERAPEUTIC EXERCISES: CPT

## 2025-03-11 NOTE — FLOWSHEET NOTE
[facility] - Outpatient Rehabilitation and Therapy [address] [place] [address] office: [phone] fax: [phone]          Physical Therapy: TREATMENT/PROGRESS NOTE   Patient: Antolin Pena (74 y.o. male)   Treatment Date: 2025   :  1950 MRN: [id number]   Visit #: 96   Insurance Allowable Auth Needed   medicare []Yes    [x]No    Insurance: Payor: MEDICARE / Plan: MEDICARE PART A AND B / Product Type: *No Product type* /   Insurance ID: [id number] - (Medicare)  Secondary Insurance (if applicable): [employer] OF [place]   Treatment Diagnosis:     ICD-10-CM    1. Weakness generalized  R53.1       2. Chronic pain of left knee  M25.562     G89.29          Medical Diagnosis:    Age-related physical debility [R54]   Referring Physician: Kinza Padilla MD  PCP: Kaleb Lujan DO                             Plan of care signed (Y/N): N      Date of Patient follow up with Physician:      Progress Report/POC: no  POC update due: (10 visits /OR AUTH LIMITS, whichever is less) 54 visits or 4/10/2025     Precautions/ Contra-indications:                                                                                          Latex allergy:  NO  Pacemaker:    NO  Contraindications for Manipulation: unhealthy/ multiple comorbidities   Date of Surgery: n/a  Other:     Preferred Language for Healthcare:   [x]English       []other:      SUBJECTIVE EXAMINATION     Patient Report/Comments:  Pt states that he doesn't feel as good today. \"I'm not sure why.\"  Antolin reports that his balance isn't as good today. He reported 1 bout of feeling light headed today while performing standing exercises. Treatment duration was decreased today due to Pt feeling light headed and having less energy today.                Test used Initial score  2025   Pain Summary VAS 0-2 0/10   Functional questionnaire LEFS and Nelson Balance Scale  LEFS:   NELSON/56   Other:

## 2025-03-13 ENCOUNTER — HOSPITAL ENCOUNTER (OUTPATIENT)
Dept: PHYSICAL THERAPY | Age: 75
Setting detail: THERAPIES SERIES
Discharge: HOME | End: 2025-03-13
Payer: MEDICARE

## 2025-03-13 PROCEDURE — 97140 MANUAL THERAPY 1/> REGIONS: CPT

## 2025-03-13 PROCEDURE — 97110 THERAPEUTIC EXERCISES: CPT

## 2025-03-13 PROCEDURE — 97530 THERAPEUTIC ACTIVITIES: CPT

## 2025-03-13 NOTE — FLOWSHEET NOTE
full function and prevent re-injury.               Status: [] Progressing: [x] Met: [] Not Met: [] Adjusted  Patient will have a decrease in pain to 0/10 to help  facilitate improvement in movement, function, and ADLs as indicated by functional deficits.              Status: [] Progressing: [] Met: [x] Not Met: [] Adjusted     Long Term Goals: To be achieved in: 4-6 weeks  Disability index score of 52 or more for the Hardy Balance Scale to assist with return to prior level of function.                     Status: [x] Progressing: [] Met: [x] Not Met: [] Adjusted  Improve knee AROM Flexion to WFL to allow for proper joint functioning as indicated by patients functional deficits.  Status: [] Progressing: [x] Met: [] Not Met: [] Adjusted  Pt to improve strength to 4+/5 or better of quadriceps to allow for proper muscle and joint use in functional mobility, ADLs and prior level of function  Status: [] Progressing: [x] Met: [] Not Met: [] Adjusted  Patient will return to putting shoes/socks on without increased symptoms or restriction to work towards return to prior level of function.  Status: [x] Progressing: [] Met: [] Not Met: [] Adjusted  Pt will be able to go up and down 4 8 inch steps safely and without assist or supervision to mimic being able to get in and out of tractor.                                                                                                              Status: [x] Progressing: [] Met: [x] Not Met: [] Adjusted [] Comment:     Overall Progression Towards Functional goals/ Treatment Progress Update:  [] Patient is progressing as expected towards functional goals listed.    [x] Progression is slowed due to complexities/Impairments listed.  [] Progression has been slowed due to co-morbidities.  [] Plan just implemented, too soon (<30days) to assess goals progression   [] Goals require adjustment due to lack of progress  [] Patient is not progressing as expected and requires additional follow

## 2025-03-18 ENCOUNTER — HOSPITAL ENCOUNTER (OUTPATIENT)
Dept: PHYSICAL THERAPY | Age: 75
Setting detail: THERAPIES SERIES
Discharge: HOME | End: 2025-03-18
Payer: MEDICARE

## 2025-03-18 PROCEDURE — 97110 THERAPEUTIC EXERCISES: CPT

## 2025-03-18 PROCEDURE — 97530 THERAPEUTIC ACTIVITIES: CPT

## 2025-03-18 NOTE — FLOWSHEET NOTE
4/5 4/5     abduction 4/5 4/5     ER 4/5 4/5     IR 4+/5 4+/5          HIP Flexion          Abduction 4-/5  4-/5     KNEE Flexion 29.5lbs* 36.0lbs Pain L knee    Extension 38.5lbs 52.4lbs      2025      MMT L MMT R Notes   Shoulder flexion 4/5 4/5     abduction 4/5 4/5     ER 4/5 4/5     IR 4+/5 4+/5          HIP Flexion          Abduction 4-/5  4-/5     KNEE Flexion 36.2lbs 37.9lbs      Extension 42.5lbs 50.7lbs        3/28/2024  TU.72s     ROM/Strength: (Blank cells denote NT)     Mvmt (norm) AROM L AROM R Notes PROM L PROM R Notes                        LUMBAR Flex (90)              Ext (25)              SB (25)                Rotation (30)                                       HIP Flex (120)                Abd (45)                ER (50)                IR (45)                Ext (20)                                 KNEE Flex (140) 100 100 Measured in sitting          Ext (0) -5 0                                ANKLE DF (20)                PF (50)                Inversion (30)                Eversion (20)                      MMT L MMT R Notes         HIP Flexion          Abduction          ER          IR          Extension                     KNEE Flexion 3+/5 4/5      Extension 3-/5 4/5 Uanble to achieve full AROM     Exercises/Interventions:     Therapeutic Ex (95845) - 28 resistance Sets/time Reps Notes/Cues/Progressions   Functional assessment  6 min     Recumbent bike 12  8 min  Working toward more endurance    Seated fwd ball rolls  3 10 Fwd, diagonal to stretch low back   Seated fwd ball rolls green SB 1 12 Sore after   Standing rows Yellow crossover cords 2 10    Seated no money Red TB    Seated rows Black tube      Seated hamstring curl with band Slider, green band   2 sets L, 1 set R   bridge    table   BOSU lunge   10 bilateral   Seated hip abd as chair allows Small blue      Seated march  6# 3 10    Seated LAQ 6# 3 10    Leg press 90# 3 10 Cues to prevent lock out of knee; progress weight as

## 2025-03-20 ENCOUNTER — HOSPITAL ENCOUNTER (OUTPATIENT)
Dept: PHYSICAL THERAPY | Age: 75
Setting detail: THERAPIES SERIES
Discharge: HOME | End: 2025-03-20
Payer: MEDICARE

## 2025-03-20 PROCEDURE — 97110 THERAPEUTIC EXERCISES: CPT

## 2025-03-20 PROCEDURE — 97530 THERAPEUTIC ACTIVITIES: CPT

## 2025-03-20 NOTE — FLOWSHEET NOTE
Good [x] Fair  [] Poor    Patient requires continued skilled intervention: [x] Yes  [] No      GOALS     Patient stated goal: \"get in and out of tractor, get stronger\"  Status: [] Progressing: [] Met: [x] Not Met: [] Adjusted     Therapist goals for Patient:   Short Term Goals: To be achieved in: 2 weeks  Independent in HEP and progression per patient tolerance, in order to progress toward full function and prevent re-injury.               Status: [] Progressing: [x] Met: [] Not Met: [] Adjusted  Patient will have a decrease in pain to 0/10 to help  facilitate improvement in movement, function, and ADLs as indicated by functional deficits.              Status: [] Progressing: [] Met: [x] Not Met: [] Adjusted     Long Term Goals: To be achieved in: 4-6 weeks  Disability index score of 52 or more for the Hardy Balance Scale to assist with return to prior level of function.                     Status: [x] Progressing: [] Met: [x] Not Met: [] Adjusted  Improve knee AROM Flexion to WFL to allow for proper joint functioning as indicated by patients functional deficits.  Status: [] Progressing: [x] Met: [] Not Met: [] Adjusted  Pt to improve strength to 4+/5 or better of quadriceps to allow for proper muscle and joint use in functional mobility, ADLs and prior level of function  Status: [] Progressing: [x] Met: [] Not Met: [] Adjusted  Patient will return to putting shoes/socks on without increased symptoms or restriction to work towards return to prior level of function.  Status: [x] Progressing: [] Met: [] Not Met: [] Adjusted  Pt will be able to go up and down 4 8 inch steps safely and without assist or supervision to mimic being able to get in and out of tractor.                                                                                                              Status: [x] Progressing: [] Met: [x] Not Met: [] Adjusted [] Comment:     Overall Progression Towards Functional goals/ Treatment Progress Update:  []

## 2025-03-25 ENCOUNTER — HOSPITAL ENCOUNTER (OUTPATIENT)
Dept: PHYSICAL THERAPY | Age: 75
Setting detail: THERAPIES SERIES
Discharge: HOME | End: 2025-03-25
Payer: MEDICARE

## 2025-03-25 PROCEDURE — 97530 THERAPEUTIC ACTIVITIES: CPT

## 2025-03-25 PROCEDURE — 97110 THERAPEUTIC EXERCISES: CPT

## 2025-03-27 ENCOUNTER — HOSPITAL ENCOUNTER (OUTPATIENT)
Dept: PHYSICAL THERAPY | Age: 75
Setting detail: THERAPIES SERIES
Discharge: HOME | End: 2025-03-27
Payer: MEDICARE

## 2025-03-27 PROCEDURE — 97530 THERAPEUTIC ACTIVITIES: CPT

## 2025-03-27 PROCEDURE — 97110 THERAPEUTIC EXERCISES: CPT

## 2025-03-27 NOTE — PLAN OF CARE
[facility] - Outpatient Rehabilitation and Therapy [address] [place] [address] office: [phone] fax: [phone]          Physical Therapy Re-Certification Plan of Care/MD UPDATE    Dear  Dr. Kinza Padilla,    We had the pleasure of treating the following patient for physical therapy services at [facility].  A summary of our findings can be found in the updated assessment below.  This includes our plan of care.  If you have any questions or concerns regarding these findings, please do not hesitate to contact me at the office phone number checked above.  Thank you for the referral.     Physician Signature:________________________________Date:__________________  By signing above (or electronic signature), therapist’s plan is approved by physician      Current Functional Status:   Antolin Pena 1950 continues to present with functional deficits in strength symmetry, endurance of strength, and cardiovascular endurance  limiting ability with walking on uneven ground, managing community ambulation, walking up/down stairs, carrying items, lifting items, heavy home activity, and yardwork .  During therapy this date, patient required visual cueing, verbal cueing, and progression of exercises and program for exercise progression, improving proper muscle recruitment and activation/motor control patterns, and static and dynamic balance. Patient will continue to benefit from ongoing evaluation and advanced clinical decision from a Physical Therapist to improve muscle strength, endurance, normalization of gait, and balance and proprioception to safely return to PLOF and work/work related tasks without symptoms or restrictions.    Overall Response to Treatment:   []Patient is responding well to treatment and improvement is noted with regards to goals   []Patient should continue to improve in reasonable time if they continue HEP   [x]Patient has plateaued and is no

## 2025-04-01 ENCOUNTER — HOSPITAL ENCOUNTER (OUTPATIENT)
Dept: PHYSICAL THERAPY | Age: 75
Setting detail: THERAPIES SERIES
Discharge: HOME | End: 2025-04-01
Payer: MEDICARE

## 2025-04-01 PROCEDURE — 97530 THERAPEUTIC ACTIVITIES: CPT

## 2025-04-01 PROCEDURE — 97110 THERAPEUTIC EXERCISES: CPT

## 2025-04-01 NOTE — FLOWSHEET NOTE
[facility] - Outpatient Rehabilitation and Therapy [address] [place] [address] office: [phone] fax: [phone]              Physical Therapy: TREATMENT/PROGRESS NOTE   Patient: Antolin Pena (74 y.o. male)   Treatment Date: 2025   :  1950 MRN: [id number]   Visit #: 102   Insurance Allowable Auth Needed   medicare []Yes    [x]No    Insurance: Payor: MEDICARE / Plan: MEDICARE PART A AND B / Product Type: *No Product type* /   Insurance ID: [id number] - (Medicare)  Secondary Insurance (if applicable): [employer] [place]   Treatment Diagnosis:     ICD-10-CM    1. Weakness generalized  R53.1       2. Chronic pain of left knee  M25.562     G89.29          Medical Diagnosis:    Age-related physical debility [R54]   Referring Physician: Kinza Padilla MD  PCP: Kaleb Lujan DO                             Plan of care signed (Y/N): N      Date of Patient follow up with Physician:      Progress Report/POC: no  POC update due: (10 visits /OR AUTH LIMITS, whichever is less) 54 visits or 2025     Precautions/ Contra-indications:                                                                                          Latex allergy:  NO  Pacemaker:    NO  Contraindications for Manipulation: unhealthy/ multiple comorbidities   Date of Surgery: n/a  Other:     Preferred Language for Healthcare:   [x]English       []other:      SUBJECTIVE EXAMINATION     Patient Report/Comments:  Pt states that he didn't feel as stable on his feet this morning when he got moving. \"Sometimes that happens. I don't know why.\"  Antolin notes that his back is sore today.        Test used Initial score  2025   Pain Summary VAS 0-2 3/10   Functional questionnaire LEFS and Nelson Balance Scale  LEFS: 32  NELSON/56   Other:                OBJECTIVE EXAMINATION     Observation:   3/20/25: I use of inhaler for SOB. Multiple seated rest breaks taken per SOB and generalized

## 2025-04-03 ENCOUNTER — APPOINTMENT (OUTPATIENT)
Dept: PHYSICAL THERAPY | Age: 75
End: 2025-04-03
Payer: MEDICARE

## 2025-04-03 ENCOUNTER — HOSPITAL ENCOUNTER (OUTPATIENT)
Dept: PHYSICAL THERAPY | Age: 75
Setting detail: THERAPIES SERIES
Discharge: HOME | End: 2025-04-03
Payer: MEDICARE

## 2025-04-03 DIAGNOSIS — M54.50 CHRONIC MIDLINE LOW BACK PAIN WITHOUT SCIATICA: Primary | ICD-10-CM

## 2025-04-03 DIAGNOSIS — G89.29 CHRONIC MIDLINE LOW BACK PAIN WITHOUT SCIATICA: Primary | ICD-10-CM

## 2025-04-03 PROCEDURE — 97110 THERAPEUTIC EXERCISES: CPT

## 2025-04-03 PROCEDURE — 97161 PT EVAL LOW COMPLEX 20 MIN: CPT

## 2025-04-03 NOTE — PLAN OF CARE
[facility] - Outpatient Rehabilitation and Therapy: [address] [place] [address] office: [phone] fax: [phone]     Physical Therapy Initial Evaluation Certification      Dear Kaleb Lujan,* ,    We had the pleasure of evaluating the following patient for physical therapy services at [facility].  A summary of our findings can be found in the initial assessment below.  This includes our plan of care.  If you have any questions or concerns regarding these findings, please do not hesitate to contact me at the office phone number listed above.  Thank you for the referral.     Physician Signature:_______________________________Date:__________________  By signing above (or electronic signature), therapist’s plan is approved by physician       Physical Therapy: TREATMENT/PROGRESS NOTE   Patient: Antolin Pena (74 y.o. male)   Examination Date: 2025   :  1950 MRN: [id number]   Visit #: 1   Insurance Allowable Auth Needed   mn []Yes    [x]No    Insurance: Payor: MEDICARE / Plan: MEDICARE PART A AND B / Product Type: *No Product type* /   Insurance ID: [id number] - (Medicare)  Secondary Insurance (if applicable): [facility]   Treatment Diagnosis:     ICD-10-CM    1. Chronic midline low back pain without sciatica  M54.50     G89.29          Medical Diagnosis:  Other intervertebral disc degeneration, lumbar region with discogenic back pain only [M51.360]   Referring Physician: Kaleb Lujan*  PCP: Kaleb Lujan DO     Plan of care signed (Y/N):     Date of Patient follow up with Physician:      Plan of Care Report: EVAL today  POC update due: (10 visits /OR AUTH LIMITS, whichever is less) 10 visits or  2025                                             Medical History:  Comorbidities:  Diabetes (Type I or II)  Hypertension  Osteoarthritis  Other Cardiovascular Conditions: afib  Relevant Medical History: see chart

## 2025-04-08 ENCOUNTER — HOSPITAL ENCOUNTER (OUTPATIENT)
Dept: PHYSICAL THERAPY | Age: 75
Setting detail: THERAPIES SERIES
Discharge: HOME | End: 2025-04-08
Payer: MEDICARE

## 2025-04-08 PROCEDURE — 97110 THERAPEUTIC EXERCISES: CPT

## 2025-04-08 NOTE — FLOWSHEET NOTE
complexities/Impairments listed.  [] Progression has been slowed due to co-morbidities.=  [x] Plan just implemented, too soon (<30days) to assess goals progression   [] Goals require adjustment due to lack of progress  [] Patient is not progressing as expected and requires additional follow up with physician  [] Other:     TREATMENT PLAN     Frequency/Duration: 1-2x/week for 4-6 weeks for the following treatment interventions:    Interventions:  Therapeutic Exercise (47734) including: strength training, ROM, and functional mobility  Therapeutic Activities (67772) including: functional mobility training and education.  Neuromuscular Re-education (75216) activation and proprioception, including postural re-education.    Gait Training (11028) for normalization of ambulation patterns and AD training.   Manual Therapy (13405) as indicated to include: Passive Range of Motion, Gr I-IV mobilizations, and Soft Tissue Mobilization    Plan: POC initiated as per evaluation    Electronically Signed by Nikkie Collado, PT  Date: 04/08/2025     Note: Portions of this note have been templated and/or copied from initial evaluation, reassessments and prior notes for documentation efficiency.    Note: If patient does not return for scheduled/recommended follow up visits, this note will serve as a discharge from care along with the most recent update on progress.

## 2025-04-10 ENCOUNTER — APPOINTMENT (OUTPATIENT)
Dept: PHYSICAL THERAPY | Age: 75
End: 2025-04-10
Payer: MEDICARE

## 2025-04-15 ENCOUNTER — HOSPITAL ENCOUNTER (OUTPATIENT)
Dept: PHYSICAL THERAPY | Age: 75
Setting detail: THERAPIES SERIES
Discharge: HOME | End: 2025-04-15
Payer: MEDICARE

## 2025-04-15 PROCEDURE — 97530 THERAPEUTIC ACTIVITIES: CPT

## 2025-04-15 PROCEDURE — 97110 THERAPEUTIC EXERCISES: CPT

## 2025-04-16 NOTE — FLOWSHEET NOTE
surgery.   (62387) THERAPEUTIC ACTIVITY - use of dynamic activities to improve functional performance. (Ex include squatting, ascending/descending stairs, walking, bending, lifting, catching, throwing, pushing, pulling, jumping.)  Direct, one on one contact, billed in 15-minute increments.    GOALS     Patient stated goal: \"be able to walk and carry things\"  [] Progressing: [] Met: [x] Not Met: [] Adjusted    Therapist goals for Patient:   Short Term Goals: To be achieved in: 2 weeks  1Independent in HEP and progression per patient tolerance, in order to prevent re-injury.   [] Progressing: [] Met: [x] Not Met: [] Adjusted  Patient will have a decrease in pain to <2/10 to facilitate improvement in movement, function, and ADLs as indicated by Functional Deficits.  [] Progressing: [] Met: [x] Not Met: [] Adjusted    Long Term Goals: To be achieved in: 4-8 weeks  Disability index score of 25% or less for the Modified Oswestry to assist with reaching prior level of function with activities such as prolonged walking, prolonged standing, carrying > 10 pounds.  [] Progressing: [] Met: [x] Not Met: [] Adjusted  Patient will demonstrate increased AROM of lumbar extesion to 10 deg without pain to allow for proper joint functioning to enable patient to improve tolerance to functional extension including walking and standing.   [] Progressing: [] Met: [x] Not Met: [] Adjusted  Patient will demonstrate increased Strength of hip abductors to at least 4+/5 throughout without pain to allow for proper functional mobility to enable patient to return to improve stability and standing tolerance.   [] Progressing: [] Met: [x] Not Met: [] Adjusted  Patient will return to carry 20# for 50 feet without increased symptoms or restriction.   [] Progressing: [] Met: [x] Not Met: [] Adjusted  Pt will complete TUG in 20 seconds or less to demonstrate improvement in safety and decreased falls risk per standards.  [] Progressing: [] Met: [x] Not

## 2025-04-17 ENCOUNTER — HOSPITAL ENCOUNTER (OUTPATIENT)
Dept: PHYSICAL THERAPY | Age: 75
Setting detail: THERAPIES SERIES
Discharge: HOME | End: 2025-04-17
Payer: MEDICARE

## 2025-04-17 PROCEDURE — 97110 THERAPEUTIC EXERCISES: CPT

## 2025-04-17 PROCEDURE — 97530 THERAPEUTIC ACTIVITIES: CPT

## 2025-04-17 NOTE — FLOWSHEET NOTE
[facility] - Outpatient Rehabilitation and Therapy: [address] [place] [address] office: [phone] fax: [phone]      Physical Therapy: TREATMENT/PROGRESS NOTE   Patient: Antolin Pena (74 y.o. male)   Examination Date: 2025   :  1950 MRN: [id number]   Visit #: 4   Insurance Allowable Auth Needed   mn []Yes    [x]No    Insurance: Payor: MEDICARE / Plan: MEDICARE PART A AND B / Product Type: *No Product type* /   Insurance ID: [id number] - (Medicare)  Secondary Insurance (if applicable): [employer] [place]   Treatment Diagnosis:     ICD-10-CM    1. Chronic midline low back pain without sciatica  M54.50     G89.29          Medical Diagnosis:  Other intervertebral disc degeneration, lumbar region with discogenic back pain only [M51.360]   Referring Physician: Kaleb Lujan*  PCP: Kaleb Lujan DO     Plan of care signed (Y/N):     Date of Patient follow up with Physician:      Plan of Care Report: NO  POC update due: (10 visits /OR AUTH LIMITS, whichever is less) 10 visits or  2025                                             Medical History:  Comorbidities:  Diabetes (Type I or II)  Hypertension  Osteoarthritis  Other Cardiovascular Conditions: afib  Relevant Medical History: see chart                                         Precautions/ Contra-indications:           Latex allergy:  NO  Pacemaker:    NO  Contraindications for Manipulation: unhealthy/ multiple comorbidities   Date of Surgery: n/a  Other:    Red Flags:  None    Suicide Screening:   The patient did not verbalize a primary behavioral concern, suicidal ideation, suicidal intent, or demonstrate suicidal behaviors.    Preferred Language for Healthcare:   [x] English       [] other:    SUBJECTIVE EXAMINATION     Patient stated complaint: Pt states his back feels about the same. He was doing some work yesterday and needed to sit down after about 10 minutes to rest because his back had started

## 2025-04-22 ENCOUNTER — HOSPITAL ENCOUNTER (OUTPATIENT)
Dept: PHYSICAL THERAPY | Age: 75
Setting detail: THERAPIES SERIES
Discharge: HOME | End: 2025-04-22
Payer: MEDICARE

## 2025-04-22 PROCEDURE — 97110 THERAPEUTIC EXERCISES: CPT

## 2025-04-22 NOTE — FLOWSHEET NOTE
Continues to display deficits in stiffness, weakness, and decreased NM control which required ongoing skilled physical therapy and decision making. Good tolerance to table exercises.  Pt did have a little SOB that required inhaler use before using bike for cardio at conclusion.       Medical Necessity Documentation:  I certify that this patient meets the below criteria necessary for medical necessity for care and/or justification of therapy services:  The patient has functional impairments and/or activity limitations and would benefit from continued outpatient therapy services to address the deficits outlined in the patients goals  The patient has a musculoskeletal condition(s) with a corresponding ICD-10 code that is of complexity and severity that require skilled therapeutic intervention. This has a direct and significant impact on the need for therapy and significantly impacts the rate of recovery.   The patient has associated co-morbidities along with primary diagnosis which significantly impact the rate of recovery and contribute to complexities that require skilled therapeutic intervention  The patient has associated variables that influence the amount of treatment to include:  Social support, self-efficacy/motivation, prognosis, time since onset/acuity.     Return to Play: NA    Prognosis for POC: [] Good [x] Fair  [] Poor    Patient requires continued skilled intervention: [x] Yes  [] No      CHARGE CAPTURE     PT CHARGE GRID   CPT Code (TIMED) minutes # CPT Code (UNTIMED) #     Therex (19753)  39 3  EVAL:LOW (05112 - Typically 20 minutes face-to-face)     Neuromusc. Re-ed (33174)    Re-Eval (63715)     Manual (61811)    Estim Unattended (18950)     Ther. Act (34341)    [facility] Traction (74225)     Gait (53493)    Dry Needle 1-2 muscle (69254)     Aquatic Therex (22146)    Dry Needle 3+ muscle (20561)     Iontophoresis (40545)    VASO (25847)     Ultrasound (16300)    Group Therapy (15168)     Estim Attended

## 2025-04-24 ENCOUNTER — HOSPITAL ENCOUNTER (OUTPATIENT)
Dept: PHYSICAL THERAPY | Age: 75
Setting detail: THERAPIES SERIES
Discharge: HOME | End: 2025-04-24
Payer: MEDICARE

## 2025-04-24 PROCEDURE — 97110 THERAPEUTIC EXERCISES: CPT

## 2025-04-24 NOTE — FLOWSHEET NOTE
[facility] - Outpatient Rehabilitation and Therapy: [address] [place] [address] office: [phone] fax: [phone]      Physical Therapy: TREATMENT/PROGRESS NOTE   Patient: Antolin Pena (74 y.o. male)   Examination Date: 2025   :  1950 MRN: [id number]   Visit #: 6   Insurance Allowable Auth Needed   mn []Yes    [x]No    Insurance: Payor: MEDICARE / Plan: MEDICARE PART A AND B / Product Type: *No Product type* /   Insurance ID: [id number] - (Medicare)  Secondary Insurance (if applicable): [employer] [place]   Treatment Diagnosis:     ICD-10-CM    1. Chronic midline low back pain without sciatica  M54.50     G89.29          Medical Diagnosis:  Other intervertebral disc degeneration, lumbar region with discogenic back pain only [M51.360]   Referring Physician: Kaleb Lujan*  PCP: Kaleb Lujan DO     Plan of care signed (Y/N):     Date of Patient follow up with Physician:      Plan of Care Report: NO  POC update due: (10 visits /OR AUTH LIMITS, whichever is less) 10 visits or  2025                                             Medical History:  Comorbidities:  Diabetes (Type I or II)  Hypertension  Osteoarthritis  Other Cardiovascular Conditions: afib  Relevant Medical History: see chart                                         Precautions/ Contra-indications:           Latex allergy:  NO  Pacemaker:    NO  Contraindications for Manipulation: unhealthy/ multiple comorbidities   Date of Surgery: n/a  Other:    Red Flags:  None    Suicide Screening:   The patient did not verbalize a primary behavioral concern, suicidal ideation, suicidal intent, or demonstrate suicidal behaviors.    Preferred Language for Healthcare:   [x] English       [] other:    SUBJECTIVE EXAMINATION     Patient stated complaint: Pt states that he woke up about 1:00am with pain in multiple joints. \"I took some ibuprofen and I think I was able to go back to sleep.\"  Feeling some

## 2025-04-29 ENCOUNTER — HOSPITAL ENCOUNTER (OUTPATIENT)
Dept: PHYSICAL THERAPY | Age: 75
Setting detail: THERAPIES SERIES
Discharge: HOME | End: 2025-04-29
Payer: MEDICARE

## 2025-04-29 PROCEDURE — 97110 THERAPEUTIC EXERCISES: CPT

## 2025-04-29 NOTE — FLOWSHEET NOTE
[facility] - Outpatient Rehabilitation and Therapy: [address] [place] [address] office: [phone] fax: [phone]      Physical Therapy: TREATMENT/PROGRESS NOTE   Patient: Antolin Pena (74 y.o. male)   Examination Date: 2025   :  1950 MRN: [id number]   Visit #: 7   Insurance Allowable Auth Needed   mn []Yes    [x]No    Insurance: Payor: MEDICARE / Plan: MEDICARE PART A AND B / Product Type: *No Product type* /   Insurance ID: [id number] - (Medicare)  Secondary Insurance (if applicable): [employer] [place]   Treatment Diagnosis:     ICD-10-CM    1. Chronic midline low back pain without sciatica  M54.50     G89.29          Medical Diagnosis:  Other intervertebral disc degeneration, lumbar region with discogenic back pain only [M51.360]   Referring Physician: Kaleb Lujan*  PCP: Kaleb Lujan DO     Plan of care signed (Y/N):     Date of Patient follow up with Physician:      Plan of Care Report: NO  POC update due: (10 visits /OR AUTH LIMITS, whichever is less) 10 visits or  2025                                             Medical History:  Comorbidities:  Diabetes (Type I or II)  Hypertension  Osteoarthritis  Other Cardiovascular Conditions: afib  Relevant Medical History: see chart                                         Precautions/ Contra-indications:           Latex allergy:  NO  Pacemaker:    NO  Contraindications for Manipulation: unhealthy/ multiple comorbidities   Date of Surgery: n/a  Other:    Red Flags:  None    Suicide Screening:   The patient did not verbalize a primary behavioral concern, suicidal ideation, suicidal intent, or demonstrate suicidal behaviors.    Preferred Language for Healthcare:   [x] English       [] other:    SUBJECTIVE EXAMINATION     Patient stated complaint: Pt states that he spent a good amount of time on the tractor (with help getting up onto it) over the weekend.  Feeling some burning in his hands today. \"I

## 2025-05-01 ENCOUNTER — APPOINTMENT (OUTPATIENT)
Dept: PHYSICAL THERAPY | Age: 75
End: 2025-05-01
Payer: MEDICARE

## 2025-05-06 ENCOUNTER — APPOINTMENT (OUTPATIENT)
Dept: PHYSICAL THERAPY | Age: 75
End: 2025-05-06
Payer: MEDICARE

## 2025-05-08 ENCOUNTER — APPOINTMENT (OUTPATIENT)
Dept: PHYSICAL THERAPY | Age: 75
End: 2025-05-08
Payer: MEDICARE

## 2025-05-13 ENCOUNTER — APPOINTMENT (OUTPATIENT)
Dept: PHYSICAL THERAPY | Age: 75
End: 2025-05-13
Payer: MEDICARE

## 2025-05-15 ENCOUNTER — APPOINTMENT (OUTPATIENT)
Dept: PHYSICAL THERAPY | Age: 75
End: 2025-05-15
Payer: MEDICARE

## 2025-05-20 ENCOUNTER — HOSPITAL ENCOUNTER (OUTPATIENT)
Dept: PHYSICAL THERAPY | Age: 75
Setting detail: THERAPIES SERIES
Discharge: HOME | End: 2025-05-20
Payer: MEDICARE

## 2025-05-20 PROCEDURE — 97110 THERAPEUTIC EXERCISES: CPT

## 2025-05-20 PROCEDURE — 97530 THERAPEUTIC ACTIVITIES: CPT

## 2025-05-20 NOTE — PLAN OF CARE
[facility] - Outpatient Rehabilitation and Therapy: [address] [place] [address] office: [phone] fax: [phone]      Physical Therapy Re-Certification Plan of Care/MD UPDATE    Dear  Dr. Kaleb Lujan    We had the pleasure of treating the following patient for physical therapy services at [facility].  A summary of our findings can be found in the updated assessment below.  This includes our plan of care.  If you have any questions or concerns regarding these findings, please do not hesitate to contact me at the office phone number checked above.  Thank you for the referral.     Physician Signature:________________________________Date:__________________  By signing above (or electronic signature), therapist’s plan is approved by physician    Current Functional Status:   Antolin Pena 1950 continues to present with functional deficits in strength symmetry, endurance of strength, cardiovascular endurance, and eccentric control  limiting ability with walking on even ground, managing community ambulation, walking up/down stairs, navigate curbs/steps, lifting items, pushing or pulling activity, light home activity, and yardwork .  During therapy this date, patient required visual cueing, verbal cueing, modification of technique, progression of exercises and program, and manual interventions for exercise progression, improving proper muscle recruitment and activation/motor control patterns, modulating pain, reduce/eliminate soft tissue swelling/inflammation/restriction, allowing for proper ROM, and static and dynamic balance. Patient will continue to benefit from ongoing evaluation and advanced clinical decision from a Physical Therapist to improve pain control, muscle strength, endurance, normalization of gait, balance and proprioception, functional mobility, and ADL status to safely return to PLOF, ADLs/IADLs, and work/work related tasks without

## 2025-05-22 ENCOUNTER — HOSPITAL ENCOUNTER (OUTPATIENT)
Dept: PHYSICAL THERAPY | Age: 75
Setting detail: THERAPIES SERIES
Discharge: HOME | End: 2025-05-22
Payer: MEDICARE

## 2025-05-22 PROCEDURE — 97110 THERAPEUTIC EXERCISES: CPT

## 2025-05-22 PROCEDURE — 97112 NEUROMUSCULAR REEDUCATION: CPT

## 2025-05-22 NOTE — PLAN OF CARE
[facility] - Outpatient Rehabilitation and Therapy: [address] [place] [address] office: [phone] fax: [phone]        Physical Therapy: TREATMENT/PROGRESS NOTE   Patient: Antolin Pena (74 y.o. male)   Examination Date: 2025   :  1950 MRN: [id number]   Visit #: 9   Insurance Allowable Auth Needed   mn []Yes    [x]No    Insurance: Payor: MEDICARE / Plan: MEDICARE PART A AND B / Product Type: *No Product type* /   Insurance ID: [id number] - (Medicare)  Secondary Insurance (if applicable): [employer] [place]   Treatment Diagnosis:     ICD-10-CM    1. Chronic midline low back pain without sciatica  M54.50     G89.29          Medical Diagnosis:  Other intervertebral disc degeneration, lumbar region with discogenic back pain only [M51.360]   Referring Physician: Kaleb Lujan*  PCP: Kaleb Lujan DO     Plan of care signed (Y/N):     Date of Patient follow up with Physician:      Plan of Care Report: NO  POC update due: (10 visits /OR AUTH LIMITS, whichever is less) 10 visits or  2025                                             Medical History:  Comorbidities:  Diabetes (Type I or II)  Hypertension  Osteoarthritis  Other Cardiovascular Conditions: afib  Relevant Medical History: see chart                                         Precautions/ Contra-indications:           Latex allergy:  NO  Pacemaker:    NO  Contraindications for Manipulation: unhealthy/ multiple comorbidities   Date of Surgery: n/a  Other:    Red Flags:  None    Suicide Screening:   The patient did not verbalize a primary behavioral concern, suicidal ideation, suicidal intent, or demonstrate suicidal behaviors.    Preferred Language for Healthcare:   [x] English       [] other:    SUBJECTIVE EXAMINATION     Patient stated complaint: Pt states his back is feeling okay, was bothering him while getting ready this morning but doing better right now. Pt also states he is feeling \"off\" this

## 2025-05-27 ENCOUNTER — HOSPITAL ENCOUNTER (OUTPATIENT)
Dept: PHYSICAL THERAPY | Age: 75
Setting detail: THERAPIES SERIES
Discharge: HOME | End: 2025-05-27
Payer: MEDICARE

## 2025-05-27 PROCEDURE — 97110 THERAPEUTIC EXERCISES: CPT

## 2025-05-27 NOTE — FLOWSHEET NOTE
[facility] - Outpatient Rehabilitation and Therapy: [address] [place] [address] office: [phone] fax: [phone]        Physical Therapy: TREATMENT/PROGRESS NOTE   Patient: Antolin Pena (74 y.o. male)   Examination Date: 2025   :  1950 MRN: [id number]   Visit #: 10   Insurance Allowable Auth Needed   mn []Yes    [x]No    Insurance: Payor: MEDICARE / Plan: MEDICARE PART A AND B / Product Type: *No Product type* /   Insurance ID: [id number] - (Medicare)  Secondary Insurance (if applicable): [employer] [place]   Treatment Diagnosis:     ICD-10-CM    1. Chronic midline low back pain without sciatica  M54.50     G89.29          Medical Diagnosis:  Other intervertebral disc degeneration, lumbar region with discogenic back pain only [M51.360]   Referring Physician: Kaleb Lujan*  PCP: Kaleb Lujan DO     Plan of care signed (Y/N):     Date of Patient follow up with Physician:      Plan of Care Report: NO  POC update due: (10 visits /OR AUTH LIMITS, whichever is less) 10 visits or  2025                                             Medical History:  Comorbidities:  Diabetes (Type I or II)  Hypertension  Osteoarthritis  Other Cardiovascular Conditions: afib  Relevant Medical History: see chart                                         Precautions/ Contra-indications:           Latex allergy:  NO  Pacemaker:    NO  Contraindications for Manipulation: unhealthy/ multiple comorbidities   Date of Surgery: n/a  Other:    Red Flags:  None    Suicide Screening:   The patient did not verbalize a primary behavioral concern, suicidal ideation, suicidal intent, or demonstrate suicidal behaviors.    Preferred Language for Healthcare:   [x] English       [] other:    SUBJECTIVE EXAMINATION     Patient stated complaint: Pt states that he had some difficulty with the back hurting while standing in the bathroom to get himself ready this morning. \"I had to go sit down.\"  \"I

## 2025-05-29 ENCOUNTER — HOSPITAL ENCOUNTER (OUTPATIENT)
Dept: PHYSICAL THERAPY | Age: 75
Setting detail: THERAPIES SERIES
Discharge: HOME | End: 2025-05-29
Payer: MEDICARE

## 2025-05-29 PROCEDURE — 97110 THERAPEUTIC EXERCISES: CPT

## 2025-05-29 NOTE — FLOWSHEET NOTE
[facility] - Outpatient Rehabilitation and Therapy: [address] [place] [address] office: [phone] fax: [phone]        Physical Therapy: TREATMENT/PROGRESS NOTE   Patient: Antolin Pena (74 y.o. male)   Examination Date: 2025   :  1950 MRN: [id number]   Visit #: 11   Insurance Allowable Auth Needed   mn []Yes    [x]No    Insurance: Payor: MEDICARE / Plan: MEDICARE PART A AND B / Product Type: *No Product type* /   Insurance ID: [id number] - (Medicare)  Secondary Insurance (if applicable): [employer] [place]   Treatment Diagnosis:     ICD-10-CM    1. Chronic midline low back pain without sciatica  M54.50     G89.29          Medical Diagnosis:  Other intervertebral disc degeneration, lumbar region with discogenic back pain only [M51.360]   Referring Physician: Kaleb Lujan*  PCP: Kaleb Lujan DO     Plan of care signed (Y/N):     Date of Patient follow up with Physician:      Plan of Care Report: NO  POC update due: (10 visits /OR AUTH LIMITS, whichever is less) 10 visits or  2025                                             Medical History:  Comorbidities:  Diabetes (Type I or II)  Hypertension  Osteoarthritis  Other Cardiovascular Conditions: afib  Relevant Medical History: see chart                                         Precautions/ Contra-indications:           Latex allergy:  NO  Pacemaker:    NO  Contraindications for Manipulation: unhealthy/ multiple comorbidities   Date of Surgery: n/a  Other:    Red Flags:  None    Suicide Screening:   The patient did not verbalize a primary behavioral concern, suicidal ideation, suicidal intent, or demonstrate suicidal behaviors.    Preferred Language for Healthcare:   [x] English       [] other:    SUBJECTIVE EXAMINATION     Patient stated complaint: Pt states that he feels a little \"foggy\" this morning. \"I'm not sure why.\"  The back is feeling ok so far, but Antolin says that seems to change quickly.

## 2025-06-03 ENCOUNTER — APPOINTMENT (OUTPATIENT)
Dept: PHYSICAL THERAPY | Age: 75
End: 2025-06-03
Payer: MEDICARE

## 2025-06-05 ENCOUNTER — APPOINTMENT (OUTPATIENT)
Dept: PHYSICAL THERAPY | Age: 75
End: 2025-06-05
Payer: MEDICARE

## 2025-06-10 ENCOUNTER — HOSPITAL ENCOUNTER (OUTPATIENT)
Dept: PHYSICAL THERAPY | Age: 75
Setting detail: THERAPIES SERIES
Discharge: HOME | End: 2025-06-10
Payer: MEDICARE

## 2025-06-10 PROCEDURE — 97110 THERAPEUTIC EXERCISES: CPT

## 2025-06-10 NOTE — FLOWSHEET NOTE
[facility] - Outpatient Rehabilitation and Therapy: [address] [place] [address] office: [phone] fax: [phone]        Physical Therapy: TREATMENT/PROGRESS NOTE   Patient: Antolin Pena (74 y.o. male)   Examination Date: 06/10/2025   :  1950 MRN: [id number]   Visit #: 12   Insurance Allowable Auth Needed   mn []Yes    [x]No    Insurance: Payor: MEDICARE / Plan: MEDICARE PART A AND B / Product Type: *No Product type* /   Insurance ID: [id number] - (Medicare)  Secondary Insurance (if applicable): [employer] [place]   Treatment Diagnosis:     ICD-10-CM    1. Chronic midline low back pain without sciatica  M54.50     G89.29          Medical Diagnosis:  Other intervertebral disc degeneration, lumbar region with discogenic back pain only [M51.360]   Referring Physician: Kaleb Lujan*  PCP: Kaleb Lujan DO     Plan of care signed (Y/N):     Date of Patient follow up with Physician:      Plan of Care Report: NO  POC update due: (10 visits /OR AUTH LIMITS, whichever is less) 10 visits or  7/10/2025                                             Medical History:  Comorbidities:  Diabetes (Type I or II)  Hypertension  Osteoarthritis  Other Cardiovascular Conditions: afib  Relevant Medical History: see chart                                         Precautions/ Contra-indications:           Latex allergy:  NO  Pacemaker:    NO  Contraindications for Manipulation: unhealthy/ multiple comorbidities   Date of Surgery: n/a  Other:    Red Flags:  None    Suicide Screening:   The patient did not verbalize a primary behavioral concern, suicidal ideation, suicidal intent, or demonstrate suicidal behaviors.    Preferred Language for Healthcare:   [x] English       [] other:    SUBJECTIVE EXAMINATION     Patient stated complaint: Pt states that he has been doing a few exercises at home. 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

## 2025-06-12 ENCOUNTER — HOSPITAL ENCOUNTER (OUTPATIENT)
Dept: PHYSICAL THERAPY | Age: 75
Setting detail: THERAPIES SERIES
Discharge: HOME | End: 2025-06-12
Payer: MEDICARE

## 2025-06-12 PROCEDURE — 97110 THERAPEUTIC EXERCISES: CPT

## 2025-06-12 NOTE — FLOWSHEET NOTE
expected towards functional goals listed.    [] Progression is slowed due to complexities/Impairments listed.  [] Progression has been slowed due to co-morbidities.=  [x] Plan just implemented, too soon (<30days) to assess goals progression   [] Goals require adjustment due to lack of progress  [] Patient is not progressing as expected and requires additional follow up with physician  [] Other:     TREATMENT PLAN     Frequency/Duration: 1-2x/week for 4-6 weeks for the following treatment interventions:    Interventions:  Therapeutic Exercise (59988) including: strength training, ROM, and functional mobility  Therapeutic Activities (06086) including: functional mobility training and education.  Neuromuscular Re-education (84314) activation and proprioception, including postural re-education.    Gait Training (23291) for normalization of ambulation patterns and AD training.   Manual Therapy (63696) as indicated to include: Passive Range of Motion, Gr I-IV mobilizations, and Soft Tissue Mobilization    Plan: Cont POC- Continue emphasis/focus on exercise progression, improving proper muscle recruitment and activation/motor control patterns, modulating pain, and improving postural awareness. Next visit plan to progress weights, progress reps, add new exercises, and progress balance     Electronically Signed by Susy Everett, PTA  Date: 06/12/2025     Note: Portions of this note have been templated and/or copied from initial evaluation, reassessments and prior notes for documentation efficiency.    Note: If patient does not return for scheduled/recommended follow up visits, this note will serve as a discharge from care along with the most recent update on progress.

## 2025-06-17 ENCOUNTER — HOSPITAL ENCOUNTER (OUTPATIENT)
Dept: PHYSICAL THERAPY | Age: 75
Setting detail: THERAPIES SERIES
Discharge: HOME | End: 2025-06-17
Payer: MEDICARE

## 2025-06-17 PROCEDURE — 97110 THERAPEUTIC EXERCISES: CPT

## 2025-06-17 NOTE — FLOWSHEET NOTE
Functional Activity Limitations (from functional questionnaire and intake):  Reduced ability or difficulty with changes of positions or transfers between positions  Reduced ability to maintain good posture and demonstrate good body mechanics with sitting, bending, and lifting  Reduced ability to perform lifting, reaching, carrying tasks  Reduced ability to ambulate prolonged functional periods/distances/surfaces    Participation Restrictions:   Reduced participation in home management   Reduced participation in work activities    Classification :   Signs/symptoms consistent with Lumbar mobilization/manipulation subgroup, myotomes and dermatomes intact. Meets manipulation criteria.   Signs/symptoms consistent with Lumbar direction specific/centralization subgroup       Barriers to/and or personal factors that will affect rehab potential:   Age  Co-morbidities  Environmental, home barriers      Today's Assessment: Pt with fair tolerance to table exercises and bike today.  Generalized fatigue and mild SOB throughout session.  Appropriately fatigued at conclusion without complaints of increased pain.  Patient will continue to benefit from ongoing evaluation and advanced clinical decision from a Physical Therapist to improve pain control, muscle strength, endurance, normalization of gait, balance and proprioception, functional mobility, and ADL status to safely return to PLOF, ADLs/IADLs, and work/work related tasks without symptoms or restrictions.    Medical Necessity Documentation:  I certify that this patient meets the below criteria necessary for medical necessity for care and/or justification of therapy services:  The patient has functional impairments and/or activity limitations and would benefit from continued outpatient therapy services to address the deficits outlined in the patients goals  The patient has a musculoskeletal condition(s) with a corresponding ICD-10 code that is of complexity and severity

## 2025-06-19 ENCOUNTER — HOSPITAL ENCOUNTER (OUTPATIENT)
Dept: PHYSICAL THERAPY | Age: 75
Setting detail: THERAPIES SERIES
Discharge: HOME | End: 2025-06-19
Payer: MEDICARE

## 2025-06-19 PROCEDURE — 97110 THERAPEUTIC EXERCISES: CPT

## 2025-06-19 NOTE — FLOWSHEET NOTE
[facility] - Outpatient Rehabilitation and Therapy: [address] [place] [address] office: [phone] fax: [phone]        Physical Therapy: TREATMENT/PROGRESS NOTE   Patient: Antolin Pena (74 y.o. male)   Examination Date: 2025   :  1950 MRN: [id number]   Visit #: 15   Insurance Allowable Auth Needed   mn []Yes    [x]No    Insurance: Payor: MEDICARE / Plan: MEDICARE PART A AND B / Product Type: *No Product type* /   Insurance ID: [id number] - (Medicare)  Secondary Insurance (if applicable): [employer] [place]   Treatment Diagnosis:     ICD-10-CM    1. Chronic midline low back pain without sciatica  M54.50     G89.29          Medical Diagnosis:  Other intervertebral disc degeneration, lumbar region with discogenic back pain only [M51.360]   Referring Physician: Kaleb Lujan*  PCP: Kaleb Lujan DO     Plan of care signed (Y/N):     Date of Patient follow up with Physician:      Plan of Care Report: NO  POC update due: (10 visits /OR AUTH LIMITS, whichever is less) 10 visits or  2025                                             Medical History:  Comorbidities:  Diabetes (Type I or II)  Hypertension  Osteoarthritis  Other Cardiovascular Conditions: afib  Relevant Medical History: see chart                                         Precautions/ Contra-indications:           Latex allergy:  NO  Pacemaker:    NO  Contraindications for Manipulation: unhealthy/ multiple comorbidities   Date of Surgery: n/a  Other:    Red Flags:  None    Suicide Screening:   The patient did not verbalize a primary behavioral concern, suicidal ideation, suicidal intent, or demonstrate suicidal behaviors.    Preferred Language for Healthcare:   [x] English       [] other:    SUBJECTIVE EXAMINATION     Patient stated complaint: Pt states that he feels ok this morning. The back isn't hurting much yet.  Antolin has a little more energy today than last week.     Subjective eval: Pt

## 2025-06-24 ENCOUNTER — HOSPITAL ENCOUNTER (OUTPATIENT)
Dept: PHYSICAL THERAPY | Age: 75
Setting detail: THERAPIES SERIES
Discharge: HOME | End: 2025-06-24
Payer: MEDICARE

## 2025-06-24 PROCEDURE — 97530 THERAPEUTIC ACTIVITIES: CPT

## 2025-06-24 PROCEDURE — 97110 THERAPEUTIC EXERCISES: CPT

## 2025-06-24 NOTE — FLOWSHEET NOTE
[facility] - Outpatient Rehabilitation and Therapy: [address] [place] [address] office: [phone] fax: [phone]        Physical Therapy: TREATMENT/PROGRESS NOTE   Patient: Antloin Pena (74 y.o. male)   Examination Date: 2025   :  1950 MRN: [id number]   Visit #: 16   Insurance Allowable Auth Needed   mn []Yes    [x]No    Insurance: Payor: MEDICARE / Plan: MEDICARE PART A AND B / Product Type: *No Product type* /   Insurance ID: [id number] - (Medicare)  Secondary Insurance (if applicable): [employer] [place]   Treatment Diagnosis:     ICD-10-CM    1. Chronic midline low back pain without sciatica  M54.50     G89.29          Medical Diagnosis:  Other intervertebral disc degeneration, lumbar region with discogenic back pain only [M51.360]   Referring Physician: Kaleb Lujan*  PCP: Kaleb Lujan DO     Plan of care signed (Y/N):     Date of Patient follow up with Physician:      Plan of Care Report: NO  POC update due: (10 visits /OR AUTH LIMITS, whichever is less) 10 visits or  2025                                             Medical History:  Comorbidities:  Diabetes (Type I or II)  Hypertension  Osteoarthritis  Other Cardiovascular Conditions: afib  Relevant Medical History: see chart                                         Precautions/ Contra-indications:           Latex allergy:  NO  Pacemaker:    NO  Contraindications for Manipulation: unhealthy/ multiple comorbidities   Date of Surgery: n/a  Other:    Red Flags:  None    Suicide Screening:   The patient did not verbalize a primary behavioral concern, suicidal ideation, suicidal intent, or demonstrate suicidal behaviors.    Preferred Language for Healthcare:   [x] English       [] other:    SUBJECTIVE EXAMINATION     Patient stated complaint: Pt states that he is feeling ok today. The back isn't bothering him too much yet. Not feeling as short of breath this morning at beginning of session.

## 2025-06-26 ENCOUNTER — HOSPITAL ENCOUNTER (OUTPATIENT)
Dept: PHYSICAL THERAPY | Age: 75
Setting detail: THERAPIES SERIES
Discharge: HOME | End: 2025-06-26
Payer: MEDICARE

## 2025-06-26 PROCEDURE — 97530 THERAPEUTIC ACTIVITIES: CPT

## 2025-06-26 PROCEDURE — 97110 THERAPEUTIC EXERCISES: CPT

## 2025-06-26 NOTE — FLOWSHEET NOTE
[facility] - Outpatient Rehabilitation and Therapy: [address] [place] [address] office: [phone] fax: [phone]        Physical Therapy: TREATMENT/PROGRESS NOTE   Patient: Antolin Pena (74 y.o. male)   Examination Date: 2025   :  1950 MRN: [id number]   Visit #: 17   Insurance Allowable Auth Needed   mn []Yes    [x]No    Insurance: Payor: MEDICARE / Plan: MEDICARE PART A AND B / Product Type: *No Product type* /   Insurance ID: [id number] - (Medicare)  Secondary Insurance (if applicable): [employer] [place]   Treatment Diagnosis:     ICD-10-CM    1. Chronic midline low back pain without sciatica  M54.50     G89.29          Medical Diagnosis:  Other intervertebral disc degeneration, lumbar region with discogenic back pain only [M51.360]   Referring Physician: Kaleb Lujan*  PCP: Kaleb Lujan DO     Plan of care signed (Y/N):     Date of Patient follow up with Physician:      Plan of Care Report: NO  POC update due: (10 visits /OR AUTH LIMITS, whichever is less) 10 visits or  2025                                             Medical History:  Comorbidities:  Diabetes (Type I or II)  Hypertension  Osteoarthritis  Other Cardiovascular Conditions: afib  Relevant Medical History: see chart                                         Precautions/ Contra-indications:           Latex allergy:  NO  Pacemaker:    NO  Contraindications for Manipulation: unhealthy/ multiple comorbidities   Date of Surgery: n/a  Other:    Red Flags:  None    Suicide Screening:   The patient did not verbalize a primary behavioral concern, suicidal ideation, suicidal intent, or demonstrate suicidal behaviors.    Preferred Language for Healthcare:   [x] English       [] other:    SUBJECTIVE EXAMINATION     Patient stated complaint: Pt states that he is feeling ok today. The back isn't bothering him too much yet.  Antolin only goes short distances in the house without his cane.

## 2025-07-01 ENCOUNTER — HOSPITAL ENCOUNTER (OUTPATIENT)
Dept: PHYSICAL THERAPY | Age: 75
Setting detail: THERAPIES SERIES
Discharge: HOME | End: 2025-07-01
Payer: MEDICARE

## 2025-07-01 PROCEDURE — 97110 THERAPEUTIC EXERCISES: CPT

## 2025-07-01 NOTE — FLOWSHEET NOTE
progressing as expected towards functional goals listed.    [] Progression is slowed due to complexities/Impairments listed.  [] Progression has been slowed due to co-morbidities.=  [x] Plan just implemented, too soon (<30days) to assess goals progression   [] Goals require adjustment due to lack of progress  [] Patient is not progressing as expected and requires additional follow up with physician  [] Other:     TREATMENT PLAN     Frequency/Duration: 1-2x/week for 4-6 weeks for the following treatment interventions:    Interventions:  Therapeutic Exercise (12534) including: strength training, ROM, and functional mobility  Therapeutic Activities (13398) including: functional mobility training and education.  Neuromuscular Re-education (31143) activation and proprioception, including postural re-education.    Gait Training (06008) for normalization of ambulation patterns and AD training.   Manual Therapy (61336) as indicated to include: Passive Range of Motion, Gr I-IV mobilizations, and Soft Tissue Mobilization    Plan: Cont POC- Continue emphasis/focus on exercise progression, improving proper muscle recruitment and activation/motor control patterns, modulating pain, and improving postural awareness. Next visit plan to progress weights, progress reps, add new exercises, and progress balance     Electronically Signed by Susy Everett, PTA  Date: 07/01/2025     Note: Portions of this note have been templated and/or copied from initial evaluation, reassessments and prior notes for documentation efficiency.    Note: If patient does not return for scheduled/recommended follow up visits, this note will serve as a discharge from care along with the most recent update on progress.

## 2025-07-03 ENCOUNTER — HOSPITAL ENCOUNTER (OUTPATIENT)
Dept: PHYSICAL THERAPY | Age: 75
Setting detail: THERAPIES SERIES
Discharge: HOME | End: 2025-07-03
Payer: MEDICARE

## 2025-07-03 PROCEDURE — 97110 THERAPEUTIC EXERCISES: CPT

## 2025-07-03 PROCEDURE — 97530 THERAPEUTIC ACTIVITIES: CPT

## 2025-07-03 NOTE — FLOWSHEET NOTE
(39515) THERAPEUTIC ACTIVITY - use of dynamic activities to improve functional performance. (Ex include squatting, ascending/descending stairs, walking, bending, lifting, catching, throwing, pushing, pulling, jumping.)  Direct, one on one contact, billed in 15-minute increments.    GOALS     Patient stated goal: \"be able to walk and carry things\"  [] Progressing: [] Met: [x] Not Met: [] Adjusted    Therapist goals for Patient:   Short Term Goals: To be achieved in: 2 weeks  1Independent in HEP and progression per patient tolerance, in order to prevent re-injury.   [] Progressing: [] Met: [x] Not Met: [] Adjusted  Patient will have a decrease in pain to <2/10 to facilitate improvement in movement, function, and ADLs as indicated by Functional Deficits.  [] Progressing: [] Met: [x] Not Met: [] Adjusted    Long Term Goals: To be achieved in: 4-8 weeks  Disability index score of 25% or less for the Modified Oswestry to assist with reaching prior level of function with activities such as prolonged walking, prolonged standing, carrying > 10 pounds.  [] Progressing: [] Met: [x] Not Met: [] Adjusted  Patient will demonstrate increased AROM of lumbar extesion to 10 deg without pain to allow for proper joint functioning to enable patient to improve tolerance to functional extension including walking and standing.   [] Progressing: [] Met: [x] Not Met: [] Adjusted  Patient will demonstrate increased Strength of hip abductors to at least 4+/5 throughout without pain to allow for proper functional mobility to enable patient to return to improve stability and standing tolerance.   [] Progressing: [] Met: [x] Not Met: [] Adjusted  Patient will return to carry 20# for 50 feet without increased symptoms or restriction.   [] Progressing: [] Met: [x] Not Met: [] Adjusted  Pt will complete TUG in 20 seconds or less to demonstrate improvement in safety and decreased falls risk per standards.  [] Progressing: [] Met: [x] Not Met: []

## 2025-07-07 ENCOUNTER — HOSPITAL ENCOUNTER (OUTPATIENT)
Dept: PHYSICAL THERAPY | Age: 75
Setting detail: THERAPIES SERIES
Discharge: HOME | End: 2025-07-07
Payer: MEDICARE

## 2025-07-07 PROCEDURE — 97110 THERAPEUTIC EXERCISES: CPT

## 2025-07-07 PROCEDURE — 97530 THERAPEUTIC ACTIVITIES: CPT

## 2025-07-07 NOTE — PLAN OF CARE
and improvement is noted with regards to goals   []Patient should continue to improve in reasonable time if they continue HEP   []Patient has plateaued and is no longer responding to skilled PT intervention    [x]Patient is getting worse and would benefit from return to referring MD   []Patient unable to adhere to initial POC   []Other:     Total Visits: 20     Recommendation:    []Continue PT -x / wk for - weeks.               [x]Hold PT, pending MD visit   [] Discharge to HEP. Follow up with PT or MD PRN.       Physical Therapy: TREATMENT/PROGRESS NOTE   Patient: Antolin ePna (74 y.o. male)   Examination Date: 2025   :  1950 MRN: [id number]   Visit #: 20   Insurance Allowable Auth Needed   mn []Yes    [x]No    Insurance: Payor: MEDICARE / Plan: MEDICARE PART A AND B / Product Type: *No Product type* /   Insurance ID: [id number] - (Medicare)  Secondary Insurance (if applicable): [facility]   Treatment Diagnosis:     ICD-10-CM    1. Chronic midline low back pain without sciatica  M54.50     G89.29          Medical Diagnosis:  Other intervertebral disc degeneration, lumbar region with discogenic back pain only [M51.360]   Referring Physician: Kaleb Lujan*  PCP: Kaleb Lujan DO     Plan of care signed (Y/N):     Date of Patient follow up with Physician:      Plan of Care Report: YES, Date Range for this report: 2025 to 2025  POC update due: (10 visits /OR AUTH LIMITS, whichever is less) 10 visits or  2025                                             Medical History:  Comorbidities:  Diabetes (Type I or II)  Hypertension  Osteoarthritis  Other Cardiovascular Conditions: afib  Relevant Medical History: see chart                                         Precautions/ Contra-indications:           Latex allergy:  NO  Pacemaker:    NO  Contraindications for Manipulation: unhealthy/ multiple comorbidities   Date of Surgery: n/a  Other:    Red Flags:  None    Suicide

## 2025-07-10 ENCOUNTER — APPOINTMENT (OUTPATIENT)
Dept: PHYSICAL THERAPY | Age: 75
End: 2025-07-10
Payer: MEDICARE